# Patient Record
Sex: FEMALE | Race: WHITE | NOT HISPANIC OR LATINO | URBAN - METROPOLITAN AREA
[De-identification: names, ages, dates, MRNs, and addresses within clinical notes are randomized per-mention and may not be internally consistent; named-entity substitution may affect disease eponyms.]

---

## 2022-06-10 ENCOUNTER — OFFICE VISIT (OUTPATIENT)
Dept: FAMILY MEDICINE CLINIC | Facility: CLINIC | Age: 32
End: 2022-06-10
Payer: COMMERCIAL

## 2022-06-10 VITALS
TEMPERATURE: 97.7 F | SYSTOLIC BLOOD PRESSURE: 124 MMHG | BODY MASS INDEX: 28.69 KG/M2 | HEIGHT: 65 IN | HEART RATE: 92 BPM | DIASTOLIC BLOOD PRESSURE: 70 MMHG | WEIGHT: 172.2 LBS | OXYGEN SATURATION: 97 %

## 2022-06-10 DIAGNOSIS — Z91.09 ENVIRONMENTAL ALLERGIES: ICD-10-CM

## 2022-06-10 DIAGNOSIS — E28.2 PCOS (POLYCYSTIC OVARIAN SYNDROME): ICD-10-CM

## 2022-06-10 DIAGNOSIS — Z13.220 SCREENING FOR LIPID DISORDERS: ICD-10-CM

## 2022-06-10 DIAGNOSIS — Z91.018 MULTIPLE FOOD ALLERGIES: ICD-10-CM

## 2022-06-10 DIAGNOSIS — G35 MULTIPLE SCLEROSIS (HCC): Primary | ICD-10-CM

## 2022-06-10 DIAGNOSIS — R87.619 ABNORMAL CERVICAL PAPANICOLAOU SMEAR, UNSPECIFIED ABNORMAL PAP FINDING: ICD-10-CM

## 2022-06-10 DIAGNOSIS — F33.42 RECURRENT MAJOR DEPRESSIVE DISORDER, IN FULL REMISSION (HCC): ICD-10-CM

## 2022-06-10 DIAGNOSIS — Z83.2 FAMILY HISTORY OF CLOTTING DISORDER: ICD-10-CM

## 2022-06-10 DIAGNOSIS — Z11.59 ENCOUNTER FOR HEPATITIS C SCREENING TEST FOR LOW RISK PATIENT: ICD-10-CM

## 2022-06-10 DIAGNOSIS — Z11.4 SCREENING FOR HIV (HUMAN IMMUNODEFICIENCY VIRUS): ICD-10-CM

## 2022-06-10 PROCEDURE — 3725F SCREEN DEPRESSION PERFORMED: CPT | Performed by: NURSE PRACTITIONER

## 2022-06-10 PROCEDURE — 99204 OFFICE O/P NEW MOD 45 MIN: CPT | Performed by: NURSE PRACTITIONER

## 2022-06-10 PROCEDURE — 3008F BODY MASS INDEX DOCD: CPT | Performed by: NURSE PRACTITIONER

## 2022-06-10 RX ORDER — PROPRANOLOL/HYDROCHLOROTHIAZID 40 MG-25MG
TABLET ORAL
COMMUNITY

## 2022-06-10 RX ORDER — DULOXETIN HYDROCHLORIDE 30 MG/1
30 CAPSULE, DELAYED RELEASE ORAL DAILY
Qty: 30 CAPSULE | Refills: 6 | Status: SHIPPED | OUTPATIENT
Start: 2022-06-10

## 2022-06-10 RX ORDER — OMEGA-3S/DHA/EPA/FISH OIL/D3 300MG-1000
400 CAPSULE ORAL DAILY
COMMUNITY

## 2022-06-10 RX ORDER — NORETHINDRONE ACETATE AND ETHINYL ESTRADIOL 1MG-20(21)
1 KIT ORAL DAILY
COMMUNITY
Start: 2022-04-13 | End: 2022-06-15 | Stop reason: ALTCHOICE

## 2022-06-10 RX ORDER — DULOXETIN HYDROCHLORIDE 30 MG/1
30 CAPSULE, DELAYED RELEASE ORAL DAILY
COMMUNITY
End: 2022-06-10 | Stop reason: SDUPTHER

## 2022-06-10 NOTE — PROGRESS NOTES
BMI Counseling: Body mass index is 29 1 kg/m²  The BMI is above normal  Nutrition recommendations include decreasing portion sizes, encouraging healthy choices of fruits and vegetables, decreasing fast food intake, consuming healthier snacks, limiting drinks that contain sugar, moderation in carbohydrate intake, increasing intake of lean protein, reducing intake of saturated and trans fat and reducing intake of cholesterol  Exercise recommendations include moderate physical activity 150 minutes/week  No pharmacotherapy was ordered  Patient referred to PCP  Rationale for BMI follow-up plan is due to patient being overweight or obese       Assessment/Plan:           Problem List Items Addressed This Visit        Endocrine    PCOS (polycystic ovarian syndrome)    Relevant Orders    Comprehensive metabolic panel    CBC and differential    TSH, 3rd generation with Free T4 reflex       Nervous and Auditory    Multiple sclerosis ([facility] ) - Primary    Relevant Orders    Ambulatory Referral to Neurology    Comprehensive metabolic panel    CBC and differential       Other    Abnormal cervical Papanicolaou smear    Relevant Orders    Ambulatory Referral to Gynecology    Recurrent major depressive disorder, in full remission ([facility] )    Relevant Medications    DULoxetine (CYMBALTA) 30 mg delayed release capsule    Other Relevant Orders    Comprehensive metabolic panel    TSH, 3rd generation with Free T4 reflex    Family history of clotting disorder    Relevant Orders    Thrombosis Panel    Multiple food allergies    Relevant Orders    Food Allergy Profile    Environmental allergies    Relevant Orders    [place] Allergy Panel, Adult      Other Visit Diagnoses     Encounter for hepatitis C screening test for low risk patient        Relevant Orders    Hepatitis C antibody    Screening for HIV (human immunodeficiency virus)        Relevant Orders    HIV 1/2 ANTIGEN/ANTIBODY (4TH GENERATION) W REFLEX [facility]    Screening for lipid disorders        Relevant Orders    Lipid Panel with Direct LDL reflex            Subjective:      Patient ID: Colletta Croissant is a 28 y o  female  Patient here to establish care  Patient has history of MS and was diagnosed 2 years and was on Copaxone and stopped the medication and had infection from the medication and had pain in her head and neck and was a little over a year ago  Patient has C-spine lesions and has issues with her eyes hands and feet  Symptoms are stable off medications last flare was in 12/2020 when she had COVID and has been good since  Patient has not been on anything other than her Copaxone  Patient would like to have a referral for alternate neurology  Patient also reports that she had a history of cysts ovarian when she was younger and had history of pelvic pain and odd irregular periods  Patient is looking for a GYN patient reports that she started birth control and started two years ago to try to help with her MS symptoms finding that PMS was making it worse  Patient has not had a period in two years other than spotting  Patient is on birth control and takes consistently and reports that she has not had a GYN exam in the past year and has not had a period in the past year  Patient is not trying to get pregnant at this time  Patient was having HPV and needed to have a colposcopy  Patient was told she has PCOS in the past  Patient has not been tested for clotting issue with being on birth control     Patient also reports that she has a history of opioid abuse patient was on suboxone for three years and tapered down and have been off everything for four months  Patient was following with Dr Jamar Cueva in [place]  Patient has been clean for 4 years and not having to go meetings  Patient is stable on her Duloxetine and is on for her anxiety and depression is on the 30 mg daily and has been on for the past six months   Patient is cut back on drinking only special occasions quit smoking cigarettes and is vamping and cutting down on the nicotine     Patient was having issues with constipation and was having issues with constipation and saw Dr Deepti Dinero and had rectal fissures and had an ointment and fiber and pharmacy  Patient is likely with IBS-C  Patient at this point is watching her diet and also carb limiting and fiber increase and colace and miralax patient is having a bowel movement daily and is not always emptying out  Patient family history mom adopted unknown family history dad with blood clots and he is on a blood thinner  Grandmom on dad side with colon cancer passed at 77 Aunt on dad side with breast cancer         The following portions of the patient's history were reviewed and updated as appropriate:   She  has no past medical history on file  She   Patient Active Problem List    Diagnosis Date Noted    Multiple sclerosis ([facility] ) 06/10/2022    Abnormal cervical Papanicolaou smear 06/10/2022    Recurrent major depressive disorder, in full remission ([facility] ) 06/10/2022    Family history of clotting disorder 06/10/2022    Multiple food allergies 06/10/2022    Environmental allergies 06/10/2022    PCOS (polycystic ovarian syndrome) 06/10/2022     She  has no past surgical history on file  Her family history is not on file  She  reports that she has quit smoking  She has a 15 00 pack-year smoking history  She has never used smokeless tobacco  No history on file for alcohol use and drug use    Current Outpatient Medications   Medication Sig Dispense Refill    cholecalciferol (VITAMIN D3) 400 units tablet Take 400 Units by mouth daily      DULoxetine (CYMBALTA) 30 mg delayed release capsule Take 1 capsule (30 mg total) by mouth daily 30 capsule 6    KRILL OIL PO Take by mouth      Multiple Vitamin (MULTI VITAMIN DAILY PO) Take by mouth      norethindrone-ethinyl estradiol (JUNEL FE 1/20) 1-20 MG-MCG per tablet Take 1 tablet by mouth daily      Probiotic Product (PROBIOTIC-10 PO) Take by mouth      Turmeric 500 MG CAPS Take by mouth       No current facility-administered medications for this visit  She is allergic to iodine - food allergy, shrimp flavor - food allergy, and lovenox [enoxaparin]       Review of Systems   Constitutional: Negative for activity change, appetite change, chills, diaphoresis, fatigue, fever and unexpected weight change  HENT: Positive for dental problem  Negative for congestion, drooling, ear discharge, ear pain, facial swelling, hearing loss, mouth sores, nosebleeds, postnasal drip, rhinorrhea, sinus pressure, sinus pain, sneezing, sore throat, tinnitus, trouble swallowing and voice change  Eyes: Negative for pain, redness and visual disturbance  Wearing glasses and has scaring in optic nerves and has yearly eye exams    Respiratory: Negative for apnea, cough, choking, chest tightness, shortness of breath, wheezing and stridor  Cardiovascular: Negative for chest pain, palpitations and leg swelling  Gastrointestinal: Positive for constipation  Negative for abdominal distention, abdominal pain, anal bleeding, blood in stool, diarrhea, nausea and vomiting  Endocrine: Negative  Genitourinary: Positive for hematuria  Hx of kidney stone   Musculoskeletal: Positive for neck pain  Negative for arthralgias, back pain, gait problem, joint swelling, myalgias and neck stiffness  Has some arthritis in her neck    Skin: Negative  Allergic/Immunologic: Positive for environmental allergies and food allergies  Neurological: Positive for headaches  Negative for dizziness, tremors, seizures, syncope, facial asymmetry, speech difficulty, weakness, light-headedness and numbness  Hematological: Negative  Negative for adenopathy  Does not bruise/bleed easily  Psychiatric/Behavioral: Negative for behavioral problems, dysphoric mood and sleep disturbance           Objective:      /70 (BP Location: Left arm, Patient Position: Sitting)   Pulse 92   Temp 97 7 °F (36 5 °C) (Temporal)   Ht 5' 4 5" (1 638 m)   Wt 78 1 kg (172 lb 3 2 oz)   SpO2 97%   BMI 29 10 kg/m²          Physical Exam  Vitals and nursing note reviewed  Constitutional:       General: She is not in acute distress  Appearance: She is well-developed  HENT:      Head: Normocephalic and atraumatic  Right Ear: Tympanic membrane normal       Left Ear: Tympanic membrane normal       Nose: Nose normal       Mouth/Throat:      Mouth: Mucous membranes are moist    Eyes:      Pupils: Pupils are equal, round, and reactive to light  Neck:      Thyroid: No thyromegaly  Cardiovascular:      Rate and Rhythm: Normal rate and regular rhythm  Pulses: no weak pulses          Dorsalis pedis pulses are 2+ on the right side and 2+ on the left side  Posterior tibial pulses are 2+ on the right side and 2+ on the left side  Heart sounds: Normal heart sounds  No murmur heard  Pulmonary:      Effort: Pulmonary effort is normal  No respiratory distress  Breath sounds: Normal breath sounds  No wheezing  Abdominal:      General: Bowel sounds are normal       Palpations: Abdomen is soft  Musculoskeletal:         General: Normal range of motion  Cervical back: Normal range of motion  Feet:      Right foot:      Skin integrity: No ulcer, skin breakdown, erythema, warmth, callus or dry skin  Left foot:      Skin integrity: No ulcer, skin breakdown, erythema, warmth, callus or dry skin  Skin:     General: Skin is warm and dry  Capillary Refill: Capillary refill takes less than 2 seconds  Neurological:      General: No focal deficit present  Mental Status: She is alert and oriented to person, place, and time  Psychiatric:         Mood and Affect: Mood normal          Behavior: Behavior normal          Thought Content: Thought content normal          Judgment: Judgment normal        Patient's shoes and socks removed      Right Foot/Ankle   Right Foot Inspection  Skin Exam: skin normal and skin intact  No dry skin, no warmth, no callus, no erythema, no maceration, no abnormal color, no pre-ulcer, no ulcer and no callus  Toe Exam: ROM and strength within normal limits  Sensory   Vibration: intact  Proprioception: intact  Monofilament testing: intact    Vascular  Capillary refills: < 3 seconds  The right DP pulse is 2+  The right PT pulse is 2+  Left Foot/Ankle  Left Foot Inspection  Skin Exam: skin normal and skin intact  No dry skin, no warmth, no erythema, no maceration, normal color, no pre-ulcer, no ulcer and no callus  Toe Exam: ROM and strength within normal limits  Sensory   Vibration: intact  Proprioception: intact  Monofilament testing: intact    Vascular  Capillary refills: < 3 seconds  The left DP pulse is 2+  The left PT pulse is 2+       Assign Risk Category  No deformity present  No loss of protective sensation  No weak pulses  Risk: 0

## 2022-06-11 ENCOUNTER — APPOINTMENT (OUTPATIENT)
Dept: LAB | Facility: MEDICAL CENTER | Age: 32
End: 2022-06-11
Payer: COMMERCIAL

## 2022-06-11 DIAGNOSIS — E28.2 PCOS (POLYCYSTIC OVARIAN SYNDROME): ICD-10-CM

## 2022-06-11 DIAGNOSIS — Z13.220 SCREENING FOR LIPID DISORDERS: ICD-10-CM

## 2022-06-11 DIAGNOSIS — Z11.4 SCREENING FOR HIV (HUMAN IMMUNODEFICIENCY VIRUS): ICD-10-CM

## 2022-06-11 DIAGNOSIS — Z91.018 MULTIPLE FOOD ALLERGIES: ICD-10-CM

## 2022-06-11 DIAGNOSIS — Z83.2 FAMILY HISTORY OF CLOTTING DISORDER: ICD-10-CM

## 2022-06-11 DIAGNOSIS — Z91.09 ENVIRONMENTAL ALLERGIES: ICD-10-CM

## 2022-06-11 DIAGNOSIS — G35 MULTIPLE SCLEROSIS (HCC): ICD-10-CM

## 2022-06-11 DIAGNOSIS — Z11.59 ENCOUNTER FOR HEPATITIS C SCREENING TEST FOR LOW RISK PATIENT: ICD-10-CM

## 2022-06-11 DIAGNOSIS — F33.42 RECURRENT MAJOR DEPRESSIVE DISORDER, IN FULL REMISSION (HCC): ICD-10-CM

## 2022-06-11 LAB
ALBUMIN SERPL BCP-MCNC: 4.1 G/DL (ref 3.5–5)
ALP SERPL-CCNC: 46 U/L (ref 46–116)
ALT SERPL W P-5'-P-CCNC: 27 U/L (ref 12–78)
ANION GAP SERPL CALCULATED.3IONS-SCNC: 7 MMOL/L (ref 4–13)
AST SERPL W P-5'-P-CCNC: 14 U/L (ref 5–45)
BASOPHILS # BLD AUTO: 0.06 THOUSANDS/ΜL (ref 0–0.1)
BASOPHILS NFR BLD AUTO: 1 % (ref 0–1)
BILIRUB SERPL-MCNC: 0.35 MG/DL (ref 0.2–1)
BUN SERPL-MCNC: 16 MG/DL (ref 5–25)
CALCIUM SERPL-MCNC: 9.4 MG/DL (ref 8.3–10.1)
CHLORIDE SERPL-SCNC: 105 MMOL/L (ref 100–108)
CHOLEST SERPL-MCNC: 213 MG/DL
CO2 SERPL-SCNC: 25 MMOL/L (ref 21–32)
CREAT SERPL-MCNC: 0.9 MG/DL (ref 0.6–1.3)
EOSINOPHIL # BLD AUTO: 0.17 THOUSAND/ΜL (ref 0–0.61)
EOSINOPHIL NFR BLD AUTO: 3 % (ref 0–6)
ERYTHROCYTE [DISTWIDTH] IN BLOOD BY AUTOMATED COUNT: 12.2 % (ref 11.6–15.1)
GFR SERPL CREATININE-BSD FRML MDRD: 86 ML/MIN/1.73SQ M
GLUCOSE P FAST SERPL-MCNC: 88 MG/DL (ref 65–99)
HCT VFR BLD AUTO: 45.8 % (ref 34.8–46.1)
HCV AB SER QL: NORMAL
HDLC SERPL-MCNC: 59 MG/DL
HGB BLD-MCNC: 15.2 G/DL (ref 11.5–15.4)
IMM GRANULOCYTES # BLD AUTO: 0.01 THOUSAND/UL (ref 0–0.2)
IMM GRANULOCYTES NFR BLD AUTO: 0 % (ref 0–2)
LDLC SERPL CALC-MCNC: 135 MG/DL (ref 0–100)
LYMPHOCYTES # BLD AUTO: 2.37 THOUSANDS/ΜL (ref 0.6–4.47)
LYMPHOCYTES NFR BLD AUTO: 43 % (ref 14–44)
MCH RBC QN AUTO: 30.9 PG (ref 26.8–34.3)
MCHC RBC AUTO-ENTMCNC: 33.2 G/DL (ref 31.4–37.4)
MCV RBC AUTO: 93 FL (ref 82–98)
MONOCYTES # BLD AUTO: 0.41 THOUSAND/ΜL (ref 0.17–1.22)
MONOCYTES NFR BLD AUTO: 8 % (ref 4–12)
NEUTROPHILS # BLD AUTO: 2.44 THOUSANDS/ΜL (ref 1.85–7.62)
NEUTS SEG NFR BLD AUTO: 45 % (ref 43–75)
NRBC BLD AUTO-RTO: 0 /100 WBCS
PLATELET # BLD AUTO: 237 THOUSANDS/UL (ref 149–390)
PMV BLD AUTO: 10.3 FL (ref 8.9–12.7)
POTASSIUM SERPL-SCNC: 3.8 MMOL/L (ref 3.5–5.3)
PROT SERPL-MCNC: 7.9 G/DL (ref 6.4–8.2)
RBC # BLD AUTO: 4.92 MILLION/UL (ref 3.81–5.12)
SODIUM SERPL-SCNC: 137 MMOL/L (ref 136–145)
TRIGL SERPL-MCNC: 93 MG/DL
TSH SERPL DL<=0.05 MIU/L-ACNC: 1.69 UIU/ML (ref 0.45–4.5)
WBC # BLD AUTO: 5.46 THOUSAND/UL (ref 4.31–10.16)

## 2022-06-11 PROCEDURE — 80061 LIPID PANEL: CPT

## 2022-06-11 PROCEDURE — 86146 BETA-2 GLYCOPROTEIN ANTIBODY: CPT

## 2022-06-11 PROCEDURE — 80053 COMPREHEN METABOLIC PANEL: CPT

## 2022-06-11 PROCEDURE — 85306 CLOT INHIBIT PROT S FREE: CPT

## 2022-06-11 PROCEDURE — 36415 COLL VENOUS BLD VENIPUNCTURE: CPT

## 2022-06-11 PROCEDURE — 85705 THROMBOPLASTIN INHIBITION: CPT

## 2022-06-11 PROCEDURE — 85303 CLOT INHIBIT PROT C ACTIVITY: CPT

## 2022-06-11 PROCEDURE — 85305 CLOT INHIBIT PROT S TOTAL: CPT

## 2022-06-11 PROCEDURE — 85613 RUSSELL VIPER VENOM DILUTED: CPT

## 2022-06-11 PROCEDURE — 84443 ASSAY THYROID STIM HORMONE: CPT

## 2022-06-11 PROCEDURE — 85300 ANTITHROMBIN III ACTIVITY: CPT

## 2022-06-11 PROCEDURE — 87389 HIV-1 AG W/HIV-1&-2 AB AG IA: CPT

## 2022-06-11 PROCEDURE — 86147 CARDIOLIPIN ANTIBODY EA IG: CPT

## 2022-06-11 PROCEDURE — 85670 THROMBIN TIME PLASMA: CPT

## 2022-06-11 PROCEDURE — 85732 THROMBOPLASTIN TIME PARTIAL: CPT

## 2022-06-11 PROCEDURE — 86803 HEPATITIS C AB TEST: CPT

## 2022-06-11 PROCEDURE — 81241 F5 GENE: CPT

## 2022-06-11 PROCEDURE — 85025 COMPLETE CBC W/AUTO DIFF WBC: CPT

## 2022-06-11 PROCEDURE — 82785 ASSAY OF IGE: CPT

## 2022-06-11 PROCEDURE — 86003 ALLG SPEC IGE CRUDE XTRC EA: CPT

## 2022-06-11 PROCEDURE — 81240 F2 GENE: CPT

## 2022-06-12 LAB
DEPRECATED AT III PPP: 87 % OF NORMAL (ref 92–136)
HIV 1+2 AB+HIV1 P24 AG SERPL QL IA: NORMAL

## 2022-06-13 LAB
ALMOND IGE QN: <0.1 KUA/I
CASHEW NUT IGE QN: <0.1 KUA/I
CODFISH IGE QN: <0.1 KUA/I
EGG WHITE IGE QN: <0.1 KUA/I
GLUTEN IGE QN: <0.1 KUA/I
HAZELNUT IGE QN: <0.1 KUA/L
MILK IGE QN: <0.1 KUA/I
PEANUT IGE QN: <0.1 KUA/I
PROT C AG ACT/NOR PPP IA: 125 % OF NORMAL (ref 60–150)
PROT S ACT/NOR PPP: 110 % (ref 68–108)
SALMON IGE QN: <0.1 KUA/I
SCALLOP IGE QN: <0.1 KUA/L
SESAME SEED IGE QN: <0.1 KUA/I
SHRIMP IGE QN: <0.1 KUA/L
SOYBEAN IGE QN: <0.1 KUA/I
TOTAL IGE SMQN RAST: 9.99 KU/L (ref 0–113)
TUNA IGE QN: <0.1 KUA/I
WALNUT IGE QN: <0.1 KUA/I
WHEAT IGE QN: <0.1 KUA/I

## 2022-06-14 LAB
APTT SCREEN TO CONFIRM RATIO: 1.1 RATIO (ref 0–1.34)
CONFIRM APTT/NORMAL: 34.7 SEC (ref 0–47.6)
LA PPP-IMP: NORMAL
PROT S ACT/NOR PPP: 108 % (ref 61–136)
PROT S PPP-ACNC: 89 % (ref 60–150)
SCREEN APTT: 41.2 SEC (ref 0–51.9)
SCREEN DRVVT: 40.6 SEC (ref 0–47)
THROMBIN TIME: 16.4 SEC (ref 0–23)

## 2022-06-15 PROBLEM — D68.59 ANTITHROMBIN 3 DEFICIENCY (HCC): Status: ACTIVE | Noted: 2022-06-15

## 2022-06-15 LAB
A ALTERNATA IGE QN: <0.1 KUA/I
A FUMIGATUS IGE QN: <0.1 KUA/I
B2 GLYCOPROT1 IGA SERPL IA-ACNC: 2
B2 GLYCOPROT1 IGG SERPL IA-ACNC: 0.8
B2 GLYCOPROT1 IGM SERPL IA-ACNC: <2.9
BERMUDA GRASS IGE QN: <0.1 KUA/I
BOXELDER IGE QN: <0.1 KUA/I
C HERBARUM IGE QN: <0.1 KUA/I
CARDIOLIPIN IGA SER IA-ACNC: 1.9
CARDIOLIPIN IGG SER IA-ACNC: 1
CARDIOLIPIN IGM SER IA-ACNC: 1
CAT DANDER IGE QN: <0.1 KUA/I
CMN PIGWEED IGE QN: <0.1 KUA/I
COMMON RAGWEED IGE QN: <0.1 KUA/I
COTTONWOOD IGE QN: <0.1 KUA/I
D FARINAE IGE QN: <0.1 KUA/I
D PTERONYSS IGE QN: <0.1 KUA/I
DOG DANDER IGE QN: <0.1 KUA/I
LONDON PLANE IGE QN: <0.1 KUA/I
MOUSE URINE PROT IGE QN: <0.1 KUA/I
MT JUNIPER IGE QN: <0.1 KUA/I
MUGWORT IGE QN: <0.1 KUA/I
P NOTATUM IGE QN: <0.1 KUA/I
ROACH IGE QN: <0.1 KUA/I
SHEEP SORREL IGE QN: <0.1 KUA/I
SILVER BIRCH IGE QN: <0.1 KUA/I
TIMOTHY IGE QN: <0.1 KUA/I
TOTAL IGE SMQN RAST: 10.1 KU/L (ref 0–113)
WALNUT IGE QN: <0.1 KUA/I
WHITE ASH IGE QN: <0.1 KUA/I
WHITE ELM IGE QN: <0.1 KUA/I
WHITE MULBERRY IGE QN: <0.1 KUA/I
WHITE OAK IGE QN: <0.1 KUA/I

## 2022-06-16 LAB
F2 GENE MUT ANL BLD/T: NORMAL
F5 GENE MUT ANL BLD/T: NORMAL

## 2022-09-23 ENCOUNTER — OFFICE VISIT (OUTPATIENT)
Dept: OBGYN CLINIC | Facility: MEDICAL CENTER | Age: 32
End: 2022-09-23
Payer: COMMERCIAL

## 2022-09-23 VITALS
DIASTOLIC BLOOD PRESSURE: 84 MMHG | SYSTOLIC BLOOD PRESSURE: 132 MMHG | BODY MASS INDEX: 30.19 KG/M2 | HEIGHT: 64 IN | WEIGHT: 176.8 LBS

## 2022-09-23 DIAGNOSIS — R87.619 ABNORMAL CERVICAL PAPANICOLAOU SMEAR, UNSPECIFIED ABNORMAL PAP FINDING: ICD-10-CM

## 2022-09-23 DIAGNOSIS — D68.59 ANTITHROMBIN 3 DEFICIENCY (HCC): ICD-10-CM

## 2022-09-23 DIAGNOSIS — G35 MULTIPLE SCLEROSIS (HCC): ICD-10-CM

## 2022-09-23 DIAGNOSIS — Z87.2 HISTORY OF DIMPLING OF BREAST SKIN: ICD-10-CM

## 2022-09-23 DIAGNOSIS — Z01.419 ENCOUNTER FOR ANNUAL ROUTINE GYNECOLOGICAL EXAMINATION: Primary | ICD-10-CM

## 2022-09-23 DIAGNOSIS — Z11.51 SCREENING FOR HUMAN PAPILLOMAVIRUS (HPV): ICD-10-CM

## 2022-09-23 DIAGNOSIS — Z30.41 ORAL CONTRACEPTIVE PILL SURVEILLANCE: ICD-10-CM

## 2022-09-23 PROCEDURE — G0476 HPV COMBO ASSAY CA SCREEN: HCPCS | Performed by: OBSTETRICS & GYNECOLOGY

## 2022-09-23 PROCEDURE — S0610 ANNUAL GYNECOLOGICAL EXAMINA: HCPCS | Performed by: OBSTETRICS & GYNECOLOGY

## 2022-09-23 PROCEDURE — G0145 SCR C/V CYTO,THINLAYER,RESCR: HCPCS | Performed by: OBSTETRICS & GYNECOLOGY

## 2022-09-23 RX ORDER — ACETAMINOPHEN AND CODEINE PHOSPHATE 120; 12 MG/5ML; MG/5ML
1 SOLUTION ORAL DAILY
Qty: 84 TABLET | Refills: 1 | Status: SHIPPED | OUTPATIENT
Start: 2022-09-23

## 2022-09-23 NOTE — PROGRESS NOTES
Tina Leonel  1990      CC:  Yearly exam    S:  28 y o  female here for yearly exam      Was on [address] for control of PMS - but had to stop with antithrombin III deficiency diagnosis  Has had two cycles off of OCP - heavy and crampy and longer lasting (7-8d)  She has a concern today about some right breast skin dimpling at the outer quadrants  She is unsure if it related to weight changes or not  She denies vaginal discharge, itching, pelvic pain  Sexual activity: She is not currently sexually active - split from boyfriend just recently  She is interested in STD screening today  Contraception: Interested in other contraceptive options  Last Pap:  4/23/21 - Normal Cytology, HR HPV positive     We reviewed ASCCP guidelines for Pap testing today       Family hx of breast cancer: no  Family hx of ovarian cancer: no  Family hx of colon cancer: no      Current Outpatient Medications:     cholecalciferol (VITAMIN D3) 400 units tablet, Take 400 Units by mouth daily, Disp: , Rfl:     DULoxetine (CYMBALTA) 30 mg delayed release capsule, Take 1 capsule (30 mg total) by mouth daily, Disp: 30 capsule, Rfl: 6    KRILL OIL PO, Take by mouth, Disp: , Rfl:     Multiple Vitamin (MULTI VITAMIN DAILY PO), Take by mouth, Disp: , Rfl:     Probiotic Product (PROBIOTIC-10 PO), Take by mouth, Disp: , Rfl:     Turmeric 500 MG CAPS, Take by mouth, Disp: , Rfl:   Patient Active Problem List   Diagnosis    [facility] ([facility] )    Abnormal cervical Papanicolaou smear    Recurrent major depressive disorder, in full remission ([facility] )    Family history of clotting disorder    Multiple food allergies    Environmental allergies    PCOS (polycystic ovarian syndrome)    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(HCC)     Past Medical History:   Diagnosis Date    Abnormal Pap smear of cervix 2015    Depression     Multiple sclerosis ([facility]     Opioid abuse, in remission ([facility] )     Polycystic ovary syndrome     Substance abuse ([facility] )      Family History   Problem Relation Age of Onset    Hypertension Mother     Anxiety disorder Mother     Deep vein thrombosis Father     Pulmonary embolism Father     Cancer Paternal Grandmother           Review of Systems   Respiratory: Negative  Cardiovascular: Negative  Gastrointestinal: Negative for constipation and diarrhea  O:  Blood pressure 132/84, height 5' 4" (1 626 m), weight 80 2 kg (176 lb 12 8 oz), last menstrual period 08/25/2022  Patient appears well and is not in distress  Breasts are symmetrical without mass, tenderness, nipple discharge, skin changes or adenopathy    + dimpling in upright positioning  Abdomen is soft and nontender without masses  External genitals are normal without lesions or rashes  Urethral meatus and urethra are normal  Bladder is normal to palpation  Vagina is normal without discharge or bleeding  Cervix is normal without discharge or lesion  Uterus is normal, mobile, nontender without palpable mass  Adnexa are normal, nontender, without palpable mass  A:  Yearly exam      P:   Pap & HPV today     Discussed trial of micronor, which is acceptable contraceptive risk- discussed importance of strict dosing  She is undecided if she will pursue pregnancy in the future, but would like to know risks in setting of her MS and ATIII deficiency  She has a noted allergy to Lovenox - but reports was just a rash at injection site  She is very interested in a referral to [facility] for a pre-conception consult to discuss risks  Will check breast US for skin changes  RTO three months for pill check

## 2022-09-26 LAB
HPV HR 12 DNA CVX QL NAA+PROBE: POSITIVE
HPV16 DNA CVX QL NAA+PROBE: NEGATIVE
HPV18 DNA CVX QL NAA+PROBE: NEGATIVE

## 2022-10-06 ENCOUNTER — TELEPHONE (OUTPATIENT)
Dept: PERINATAL CARE | Facility: CLINIC | Age: 32
End: 2022-10-06

## 2022-10-06 LAB
LAB AP GYN PRIMARY INTERPRETATION: NORMAL
LAB AP LMP: NORMAL
Lab: NORMAL

## 2022-10-06 NOTE — TELEPHONE ENCOUNTER
Left a message three times for patient   Unable to schedule the pt for an appointment as indicated in referral

## 2022-10-10 ENCOUNTER — TELEPHONE (OUTPATIENT)
Dept: OBGYN CLINIC | Facility: CLINIC | Age: 32
End: 2022-10-10

## 2022-10-10 NOTE — TELEPHONE ENCOUNTER
----- Message from Gil Gibbs MD sent at 10/10/2022 10:14 AM EDT -----  Patient has persistent positive HPV so needs colposcopy - please notify and schedule

## 2023-01-04 DIAGNOSIS — F33.42 RECURRENT MAJOR DEPRESSIVE DISORDER, IN FULL REMISSION (HCC): ICD-10-CM

## 2023-01-04 RX ORDER — DULOXETIN HYDROCHLORIDE 30 MG/1
CAPSULE, DELAYED RELEASE ORAL
Qty: 30 CAPSULE | Refills: 6 | Status: SHIPPED | OUTPATIENT
Start: 2023-01-04

## 2023-03-15 DIAGNOSIS — Z30.41 ORAL CONTRACEPTIVE PILL SURVEILLANCE: ICD-10-CM

## 2023-03-15 RX ORDER — NORETHINDRONE 0.35 MG/1
TABLET ORAL
Qty: 84 TABLET | Refills: 1 | Status: SHIPPED | OUTPATIENT
Start: 2023-03-15

## 2023-06-22 ENCOUNTER — APPOINTMENT (OUTPATIENT)
Dept: RADIOLOGY | Facility: CLINIC | Age: 33
End: 2023-06-22
Payer: COMMERCIAL

## 2023-06-22 ENCOUNTER — OFFICE VISIT (OUTPATIENT)
Dept: URGENT CARE | Facility: CLINIC | Age: 33
End: 2023-06-22
Payer: COMMERCIAL

## 2023-06-22 VITALS
SYSTOLIC BLOOD PRESSURE: 131 MMHG | HEART RATE: 90 BPM | TEMPERATURE: 98.5 F | WEIGHT: 179.4 LBS | BODY MASS INDEX: 30.63 KG/M2 | DIASTOLIC BLOOD PRESSURE: 81 MMHG | OXYGEN SATURATION: 97 % | HEIGHT: 64 IN

## 2023-06-22 DIAGNOSIS — S90.212A CONTUSION OF LEFT GREAT TOE WITH DAMAGE TO NAIL, INITIAL ENCOUNTER: Primary | ICD-10-CM

## 2023-06-22 DIAGNOSIS — S90.212A CONTUSION OF LEFT GREAT TOE WITH DAMAGE TO NAIL, INITIAL ENCOUNTER: ICD-10-CM

## 2023-06-22 PROCEDURE — 99213 OFFICE O/P EST LOW 20 MIN: CPT | Performed by: PHYSICIAN ASSISTANT

## 2023-06-22 PROCEDURE — 73660 X-RAY EXAM OF TOE(S): CPT

## 2023-06-23 NOTE — PATIENT INSTRUCTIONS
Keep foot elevated  Keep area clean and covered  Follow-up with your primary care provider in the next 3-5 days  Any new or worsening symptoms develop get re-evaluated sooner or proceed to the ER  Radiologists reading of xray's will be available tomorrow morning

## 2023-06-23 NOTE — PROGRESS NOTES
[facility]        NAME: Elaine Gardiner is a 33 y o  female  : 1990    MRN: [id number]  DATE: 2023  TIME: 8:20 PM    Assessment and Plan   Contusion of left great toe with damage to nail, initial encounter [S90 212A]  1  Contusion of left great toe with damage to nail, initial encounter  XR toe left great min 2 views            Patient Instructions   Patient Instructions   Keep foot elevated  Keep area clean and covered  Follow-up with your primary care provider in the next 3-5 days  Any new or worsening symptoms develop get re-evaluated sooner or proceed to the ER  Radiologists reading of xray's will be available tomorrow morning  Follow up with PCP in 3-5 days  Proceed to  ER if symptoms worsen  Chief Complaint     Chief Complaint   Patient presents with   • Toe Injury     Big toe Nail injury left foot  History of Present Illness       Patient hit top of the left great toe off a metal door stop within the last hour  Split the tip of the nail and has pain and bleeding  Can walk on it  Area is throbbing but no numbness/      Review of Systems   Review of Systems   Musculoskeletal: Positive for arthralgias  Negative for gait problem  Skin: Positive for wound  Neurological: Negative for weakness and numbness           Current Medications       Current Outpatient Medications:   •  cholecalciferol (VITAMIN D3) 400 units tablet, Take 400 Units by mouth daily, Disp: , Rfl:   •  DULoxetine (CYMBALTA) 30 mg delayed release capsule, take 1 capsule by mouth daily, Disp: 30 capsule, Rfl: 6  •  Jencycla 0 35 MG tablet, take 1 tablet by mouth once daily, Disp: 84 tablet, Rfl: 1  •  KRILL OIL PO, Take by mouth, Disp: , Rfl:   •  Multiple Vitamin (MULTI VITAMIN DAILY PO), Take by mouth, Disp: , Rfl:   •  Probiotic Product (PROBIOTIC-10 PO), Take by mouth, Disp: , Rfl:   •  Turmeric 500 MG CAPS, Take by mouth, Disp: , Rfl:     Current Allergies     Allergies as of 2023 "- Reviewed 06/22/2023   Allergen Reaction Noted   • Iodine - food allergy Hives 06/10/2022   • Shrimp flavor - food allergy Hives 06/10/2022   • Lovenox [enoxaparin] Rash 06/10/2022            The following portions of the patient's history were reviewed and updated as appropriate: allergies, current medications, past family history, past medical history, past social history, past surgical history and problem list      Past Medical History:   Diagnosis Date   • Abnormal Pap smear of cervix 2015   • Depression    • Multiple sclerosis ([facility] )    • Opioid abuse, in remission ([facility] )    • Polycystic ovary syndrome    • Substance abuse ([place] [address] )        History reviewed  No pertinent surgical history  Family History   Problem Relation Age of Onset   • Hypertension Mother    • Anxiety disorder Mother    • Deep vein thrombosis Father    • Pulmonary embolism Father    • Cancer Paternal Grandmother          Medications have been verified  Objective   /81   Pulse 90   Temp 98 5 °F (36 9 °C)   Ht 5' 4\" (1 626 m)   Wt 81 4 kg (179 lb 6 4 oz)   SpO2 97%   BMI 30 79 kg/m²        Physical Exam     Physical Exam  Constitutional:       Appearance: Normal appearance  Cardiovascular:      Pulses: Normal pulses  Musculoskeletal:         General: Tenderness present  No swelling or deformity  Normal range of motion  Feet:    Feet:      Comments: 0 5cm superifical laceration of the tip of the left great nail/nailbed  No wound edge separation  Opaque nail polish over the nail  No ecchymosis, swelling, or deformity  Skin:     General: Skin is warm  Capillary Refill: Capillary refill takes less than 2 seconds  Findings: No bruising or erythema  Neurological:      Mental Status: She is alert     Psychiatric:         Mood and Affect: Mood normal          Behavior: Behavior normal                    "

## 2023-08-03 DIAGNOSIS — F33.42 RECURRENT MAJOR DEPRESSIVE DISORDER, IN FULL REMISSION (HCC): ICD-10-CM

## 2023-08-03 RX ORDER — DULOXETIN HYDROCHLORIDE 30 MG/1
CAPSULE, DELAYED RELEASE ORAL
Qty: 30 CAPSULE | Refills: 6 | OUTPATIENT
Start: 2023-08-03

## 2023-08-07 DIAGNOSIS — F33.42 RECURRENT MAJOR DEPRESSIVE DISORDER, IN FULL REMISSION (HCC): ICD-10-CM

## 2023-08-07 RX ORDER — DULOXETIN HYDROCHLORIDE 30 MG/1
30 CAPSULE, DELAYED RELEASE ORAL DAILY
Qty: 30 CAPSULE | Refills: 6 | Status: SHIPPED | OUTPATIENT
Start: 2023-08-07

## 2023-08-28 DIAGNOSIS — Z30.41 ORAL CONTRACEPTIVE PILL SURVEILLANCE: ICD-10-CM

## 2023-08-28 RX ORDER — NORETHINDRONE 0.35 MG/1
TABLET ORAL
Qty: 84 TABLET | Refills: 1 | Status: SHIPPED | OUTPATIENT
Start: 2023-08-28

## 2023-09-28 ENCOUNTER — OFFICE VISIT (OUTPATIENT)
Dept: FAMILY MEDICINE CLINIC | Facility: CLINIC | Age: 33
End: 2023-09-28
Payer: COMMERCIAL

## 2023-09-28 VITALS
HEART RATE: 99 BPM | DIASTOLIC BLOOD PRESSURE: 80 MMHG | TEMPERATURE: 97.8 F | OXYGEN SATURATION: 99 % | WEIGHT: 179.6 LBS | HEIGHT: 64 IN | SYSTOLIC BLOOD PRESSURE: 128 MMHG | BODY MASS INDEX: 30.66 KG/M2

## 2023-09-28 DIAGNOSIS — G35 MULTIPLE SCLEROSIS (HCC): ICD-10-CM

## 2023-09-28 DIAGNOSIS — Z00.00 ANNUAL PHYSICAL EXAM: Primary | ICD-10-CM

## 2023-09-28 DIAGNOSIS — F33.42 RECURRENT MAJOR DEPRESSIVE DISORDER, IN FULL REMISSION (HCC): ICD-10-CM

## 2023-09-28 DIAGNOSIS — E28.2 PCOS (POLYCYSTIC OVARIAN SYNDROME): ICD-10-CM

## 2023-09-28 DIAGNOSIS — D68.59 ANTITHROMBIN 3 DEFICIENCY (HCC): ICD-10-CM

## 2023-09-28 DIAGNOSIS — F17.210 SMOKING GREATER THAN 10 PACK YEARS: ICD-10-CM

## 2023-09-28 PROCEDURE — 99395 PREV VISIT EST AGE 18-39: CPT | Performed by: NURSE PRACTITIONER

## 2023-09-28 RX ORDER — DULOXETIN HYDROCHLORIDE 30 MG/1
30 CAPSULE, DELAYED RELEASE ORAL DAILY
Qty: 90 CAPSULE | Refills: 3 | Status: SHIPPED | OUTPATIENT
Start: 2023-09-28 | End: 2024-09-27

## 2023-09-28 NOTE — ASSESSMENT & PLAN NOTE
Has a history of loss of feeling in her face and is taking the krill oil and tumeric last eval and f/u in 2021 with neurology and declines referral at this point

## 2023-09-28 NOTE — PROGRESS NOTES
[address]    NAME: Ian Mercer  AGE: 33 y.o. 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 Appropriate education was printed on patient's after visit summary. Counseling:  Injury prevention: discussed safety/seat belts, safety helmets, smoke detectors, carbon dioxide detectors, and smoking near bedding or upholstery. Exercise: the importance of regular exercise/physical activity was discussed. Recommend exercise 3-5 times per week for at least 30 minutes. BMI Counseling: Body mass index is 30.83 kg/m². The BMI is above normal. Nutrition recommendations include decreasing portion sizes, encouraging healthy choices of fruits and vegetables, decreasing fast food intake, consuming healthier snacks, limiting drinks that contain sugar, moderation in carbohydrate intake, increasing intake of lean protein, reducing intake of saturated and trans fat and reducing intake of cholesterol.  Exercise recommendations include moderate physical activity 150 minutes/week. No pharmacotherapy was ordered. Patient referred to PCP. Rationale for BMI follow-up plan is due to patient being overweight or obese. Tobacco Cessation Counseling: Tobacco cessation counseling was provided. The patient is sincerely urged to quit consumption of tobacco. She is not ready to quit tobacco.         Return in 1 year (on 2024). Chief Complaint:     Chief Complaint   Patient presents with   • Annual Exam     Needs medication refilled- review labs      History of Present Illness:     Adult Annual Physical   Patient here for a comprehensive physical exam. The patient reports no problems. Patient here today for her check up and reports that she recently had COVID and has recovered from in early September and she has since recovered. Patient has no new complaints     Diet and Physical Activity  Diet/Nutrition: well balanced diet. Exercise: walking. Depression Screening  PHQ-2/9 Depression Screening    Little interest or pleasure in doing things: 0 - not at all  Feeling down, depressed, or hopeless: 0 - not at all  Trouble falling or staying asleep, or sleeping too much: 0 - not at all  Feeling tired or having little energy: 0 - not at all  Poor appetite or overeatin - not at all  Feeling bad about yourself - or that you are a failure or have let yourself or your family down: 0 - not at all  Trouble concentrating on things, such as reading the newspaper or watching television: 0 - not at all  Moving or speaking so slowly that other people could have noticed. Or the opposite - being so fidgety or restless that you have been moving around a lot more than usual: 0 - not at all  Thoughts that you would be better off dead, or of hurting yourself in some way: 0 - not at all  PHQ-9 Score: 0   PHQ-9 Interpretation: No or Minimal depression        General Health  Sleep: sleeps well.    Hearing: normal - bilateral.  Vision: goes for regular eye exams, most recent eye exam <1 year ago and wears glasses. Dental: regular dental visits. /GYN Health  Last menstrual period: 8/28/2023  Contraceptive method: oral contraceptives. History of STDs?: no.     Review of Systems:     Review of Systems   Constitutional: Negative for activity change, appetite change, chills, diaphoresis, fatigue, fever and unexpected weight change. HENT: Negative for congestion, ear pain, hearing loss, postnasal drip, sinus pressure, sinus pain, sneezing and sore throat. Eyes: Negative for pain, redness and visual disturbance. Respiratory: Negative for cough and shortness of breath. Smoking about 1 pack a day for the past year    Cardiovascular: Negative for chest pain and leg swelling. Gastrointestinal: Negative for abdominal pain, diarrhea, nausea and vomiting. Endocrine: Negative. Genitourinary: Negative. Negative for difficulty urinating. Musculoskeletal: Negative for arthralgias. Allergic/Immunologic: Negative. Neurological: Negative for dizziness and light-headedness. Hematological: Negative. Psychiatric/Behavioral: Negative for behavioral problems and dysphoric mood. Past Medical History:     Past Medical History:   Diagnosis Date   • Abnormal Pap smear of cervix 2015   • Depression    • Multiple sclerosis ([facility])    • Opioid abuse, in remission [facility])    • Polycystic ovary syndrome    • Substance abuse ([facility])       Past Surgical History:     No past surgical history on file.    Social History:     Social History     Socioeconomic History   • Marital status: Single     Spouse name: None   • Number of children: None   • Years of education: None   • Highest education level: None   Occupational History   • None   Tobacco Use   • Smoking status: Every Day     Packs/day: 1.00     Years: 10.00     Total pack years: 10.00     Types: Cigarettes   • Smokeless tobacco: Never   Vaping Use   • Vaping Use: Every day   • Substances: Nicotine   Substance and Sexual Activity   • Alcohol use: Not Currently   • Drug use: Not Currently     Types: Heroin, Oxycodone   • Sexual activity: Not Currently     Partners: Male     Birth control/protection: Condom Male   Other Topics Concern   • None   Social History Narrative   • None     Social Determinants of Health     Financial Resource Strain: Not on file   Food Insecurity: Not on file   Transportation Needs: Not on file   Physical Activity: Not on file   Stress: Not on file   Social Connections: Not on file   Intimate Partner Violence: Not on file   Housing Stability: Not on file      Family History:     Family History   Problem Relation Age of Onset   • Hypertension Mother    • Anxiety disorder Mother    • Deep vein thrombosis Father    • Pulmonary embolism Father    • Cancer Paternal Grandmother       Current Medications:     Current Outpatient Medications   Medication Sig Dispense Refill   • cholecalciferol (VITAMIN D3) 400 units tablet Take 400 Units by mouth daily     • DULoxetine (CYMBALTA) 30 mg delayed release capsule Take 1 capsule (30 mg total) by mouth daily 90 capsule 3   • Jencycla 0.35 MG tablet take 1 tablet by mouth once daily 84 tablet 1   • KRILL OIL PO Take by mouth     • Multiple Vitamin (MULTI VITAMIN DAILY PO) Take by mouth     • Probiotic Product (PROBIOTIC-10 PO) Take by mouth     • Turmeric 500 MG CAPS Take by mouth       No current facility-administered medications for this visit. Allergies: Allergies   Allergen Reactions   • Iodine - Food Allergy Hives   • Shrimp Flavor - Food Allergy Hives   • Lovenox [Enoxaparin] Rash      Physical Exam:     /80 (BP Location: Left arm, Patient Position: Sitting)   Pulse 99   Temp 97.8 °F (36.6 °C) (Temporal)   Ht 5' 4" (1.626 m)   Wt 81.5 kg (179 lb 9.6 oz)   SpO2 99%   BMI 30.83 kg/m²     Physical Exam  Vitals and nursing note reviewed. Constitutional:       General: She is not in acute distress. Appearance: She is well-developed.    HENT:      Head: Normocephalic and atraumatic. Right Ear: Tympanic membrane normal.      Left Ear: Tympanic membrane normal.      Nose: Nose normal.      Mouth/Throat:      Mouth: Mucous membranes are moist.   Eyes:      Conjunctiva/sclera: Conjunctivae normal.   Cardiovascular:      Rate and Rhythm: Normal rate and regular rhythm. Heart sounds: No murmur heard. Pulmonary:      Effort: Pulmonary effort is normal. No respiratory distress. Breath sounds: Normal breath sounds. Abdominal:      Palpations: Abdomen is soft. Tenderness: There is no abdominal tenderness. Musculoskeletal:         General: No swelling. Cervical back: Neck supple. Skin:     General: Skin is warm and dry. Capillary Refill: Capillary refill takes less than 2 seconds. Neurological:      General: No focal deficit present. Mental Status: She is alert and oriented to person, place, and time.    Psychiatric:         Mood and Affect: Mood normal.          Sherita Pratt, [facility]

## 2023-11-16 ENCOUNTER — OFFICE VISIT (OUTPATIENT)
Dept: FAMILY MEDICINE CLINIC | Facility: CLINIC | Age: 33
End: 2023-11-16
Payer: COMMERCIAL

## 2023-11-16 ENCOUNTER — TELEPHONE (OUTPATIENT)
Dept: FAMILY MEDICINE CLINIC | Facility: CLINIC | Age: 33
End: 2023-11-16

## 2023-11-16 VITALS
HEART RATE: 88 BPM | OXYGEN SATURATION: 98 % | SYSTOLIC BLOOD PRESSURE: 122 MMHG | DIASTOLIC BLOOD PRESSURE: 78 MMHG | HEIGHT: 64 IN | WEIGHT: 157 LBS | TEMPERATURE: 97.8 F | BODY MASS INDEX: 26.8 KG/M2

## 2023-11-16 DIAGNOSIS — F41.9 ANXIETY: Primary | ICD-10-CM

## 2023-11-16 DIAGNOSIS — K02.9 DENTAL CARIES: ICD-10-CM

## 2023-11-16 DIAGNOSIS — R42 DIZZINESS: ICD-10-CM

## 2023-11-16 DIAGNOSIS — B37.9 CANDIDIASIS: ICD-10-CM

## 2023-11-16 DIAGNOSIS — G35 MULTIPLE SCLEROSIS (HCC): Primary | ICD-10-CM

## 2023-11-16 PROCEDURE — 99214 OFFICE O/P EST MOD 30 MIN: CPT | Performed by: NURSE PRACTITIONER

## 2023-11-16 RX ORDER — ALPRAZOLAM 0.5 MG/1
TABLET ORAL
Qty: 2 TABLET | Refills: 0 | Status: SHIPPED | OUTPATIENT
Start: 2023-11-16

## 2023-11-16 RX ORDER — FLUCONAZOLE 150 MG/1
150 TABLET ORAL ONCE
Qty: 1 TABLET | Refills: 0 | Status: SHIPPED | OUTPATIENT
Start: 2023-11-16 | End: 2023-11-16

## 2023-11-16 RX ORDER — PENICILLIN V POTASSIUM 500 MG/1
500 TABLET ORAL EVERY 8 HOURS SCHEDULED
Qty: 21 TABLET | Refills: 0 | Status: SHIPPED | OUTPATIENT
Start: 2023-11-16 | End: 2023-11-23

## 2023-11-16 NOTE — PROGRESS NOTES
Name: Shelby Guzman      : 1990      MRN: [id number]  Encounter Provider: WINSOME Schmitt  Encounter Date: 2023   Encounter department: [address] [place] [address]     1. Multiple sclerosis ([address])  -     MRI brain w wo contrast; Future; Expected date: 2023    2. Dizziness  -     MRI brain w wo contrast; Future; Expected date: 2023    3. Dental caries  -     penicillin V potassium (VEETID) 500 mg tablet; Take 1 tablet (500 mg total) by mouth every 8 (eight) hours for 7 days  -     fluconazole (DIFLUCAN) 150 mg tablet; Take 1 tablet (150 mg total) by mouth once for 1 dose    4. Candidiasis  -     fluconazole (DIFLUCAN) 150 mg tablet; Take 1 tablet (150 mg total) by mouth once for 1 dose           Subjective      Patient here today and reports that this week she has noticed that she having some dizziness this week having more dizziness and no falls. She had an illness a few weeks ago and left with a linger cough. Patient also noticed that she had two lymph nodes that were tender when she was touching the areas positive sick contacts with mom. Prior to this she had COVID in September       Review of Systems   Constitutional:  Positive for fatigue. Negative for activity change, appetite change, chills, diaphoresis, fever and unexpected weight change. HENT:  Positive for dental problem. Negative for congestion, ear pain, hearing loss, postnasal drip, sinus pressure, sinus pain, sneezing and sore throat. Eyes:  Negative for pain, redness and visual disturbance. Respiratory:  Positive for cough. Negative for shortness of breath. Cardiovascular:  Negative for chest pain and leg swelling. Gastrointestinal:  Positive for nausea. Negative for abdominal pain, diarrhea and vomiting. Endocrine: Negative. Genitourinary: Negative. Musculoskeletal:  Negative for arthralgias. Allergic/Immunologic: Negative. Neurological:  Positive for dizziness. Negative for light-headedness. Hematological: Negative. Psychiatric/Behavioral:  Negative for behavioral problems and dysphoric mood. Current Outpatient Medications on File Prior to Visit   Medication Sig   • cholecalciferol (VITAMIN D3) 400 units tablet Take 400 Units by mouth daily   • Jencycla 0.35 MG tablet take 1 tablet by mouth once daily   • KRILL OIL PO Take by mouth   • Multiple Vitamin (MULTI VITAMIN DAILY PO) Take by mouth   • Probiotic Product (PROBIOTIC-10 PO) Take by mouth   • Turmeric 500 MG CAPS Take by mouth   • DULoxetine (CYMBALTA) 30 mg delayed release capsule Take 1 capsule (30 mg total) by mouth daily       Objective     /78 (BP Location: Right arm, Patient Position: Sitting)   Pulse 88   Temp 97.8 °F (36.6 °C) (Temporal)   Ht 5' 4" (1.626 m)   Wt 71.2 kg (157 lb)   SpO2 98%   BMI 26.95 kg/m²     Physical Exam  Constitutional:       General: She is not in acute distress. Appearance: She is well-developed. HENT:      Head: Normocephalic and atraumatic. Right Ear: Tympanic membrane normal.      Left Ear: Tympanic membrane normal.      Nose: Congestion present. Mouth/Throat:      Lips: Pink. Mouth: Mucous membranes are moist.     Eyes:      General: Lids are normal.      Pupils: Pupils are equal, round, and reactive to light. Neck:      Thyroid: No thyromegaly. Cardiovascular:      Rate and Rhythm: Normal rate and regular rhythm. Heart sounds: Normal heart sounds. No murmur heard. Pulmonary:      Effort: Pulmonary effort is normal. No respiratory distress. Breath sounds: Normal breath sounds. No wheezing. Abdominal:      General: Bowel sounds are normal.      Palpations: Abdomen is soft. Musculoskeletal:         General: Normal range of motion. Cervical back: Normal range of motion. Skin:     General: Skin is warm and dry. Neurological:      General: No focal deficit present.       Mental Status: She is alert and oriented to person, place, and time. GCS: GCS eye subscore is 4. GCS verbal subscore is 5. GCS motor subscore is 6.    Psychiatric:         Mood and Affect: Mood normal.       Reyes Abu, CRNP

## 2023-11-16 NOTE — TELEPHONE ENCOUNTER
Pt saw Luis Enrique Velasquez today. . she ordered an MRI brain. . patient forgot to ask Luis Enrique Velasquez if she wouldl prescribe XANAX for her, that she can take when she has to get the MRI ? ?

## 2023-11-30 ENCOUNTER — HOSPITAL ENCOUNTER (OUTPATIENT)
Dept: MRI IMAGING | Facility: HOSPITAL | Age: 33
End: 2023-11-30
Payer: COMMERCIAL

## 2023-11-30 DIAGNOSIS — G35 MULTIPLE SCLEROSIS (HCC): ICD-10-CM

## 2023-11-30 DIAGNOSIS — R42 DIZZINESS: ICD-10-CM

## 2023-11-30 PROCEDURE — 70553 MRI BRAIN STEM W/O & W/DYE: CPT

## 2023-11-30 PROCEDURE — A9585 GADOBUTROL INJECTION: HCPCS | Performed by: NURSE PRACTITIONER

## 2023-11-30 PROCEDURE — G1004 CDSM NDSC: HCPCS

## 2023-11-30 RX ORDER — GADOBUTROL 604.72 MG/ML
7 INJECTION INTRAVENOUS
Status: COMPLETED | OUTPATIENT
Start: 2023-11-30 | End: 2023-11-30

## 2023-11-30 RX ADMIN — GADOBUTROL 7 ML: 604.72 INJECTION INTRAVENOUS at 06:37

## 2023-12-04 DIAGNOSIS — G35 MULTIPLE SCLEROSIS (HCC): Primary | ICD-10-CM

## 2023-12-05 ENCOUNTER — APPOINTMENT (OUTPATIENT)
Dept: RADIOLOGY | Facility: HOSPITAL | Age: 33
DRG: 059 | End: 2023-12-05
Payer: COMMERCIAL

## 2023-12-05 ENCOUNTER — HOSPITAL ENCOUNTER (INPATIENT)
Facility: HOSPITAL | Age: 33
LOS: 5 days | Discharge: HOME | DRG: 059 | End: 2023-12-10
Attending: EMERGENCY MEDICINE | Admitting: PSYCHIATRY & NEUROLOGY
Payer: COMMERCIAL

## 2023-12-05 DIAGNOSIS — G35: ICD-10-CM

## 2023-12-05 DIAGNOSIS — G35 MULTIPLE SCLEROSIS (HCC): Primary | ICD-10-CM

## 2023-12-05 LAB
ALBUMIN SERPL BCP-MCNC: 4.1 G/DL (ref 3.5–5)
ALP SERPL-CCNC: 49 U/L (ref 34–104)
ALT SERPL W P-5'-P-CCNC: 13 U/L (ref 7–52)
AMORPH URATE CRY URNS QL MICRO: ABNORMAL
ANION GAP SERPL CALCULATED.3IONS-SCNC: 6 MMOL/L
AST SERPL W P-5'-P-CCNC: 14 U/L (ref 13–39)
BACTERIA UR QL AUTO: ABNORMAL /HPF
BASOPHILS # BLD AUTO: 0.05 THOUSANDS/ÂΜL (ref 0–0.1)
BASOPHILS NFR BLD AUTO: 1 % (ref 0–1)
BILIRUB SERPL-MCNC: 0.4 MG/DL (ref 0.2–1)
BILIRUB UR QL STRIP: NEGATIVE
BUN SERPL-MCNC: 12 MG/DL (ref 5–25)
CALCIUM SERPL-MCNC: 8.7 MG/DL (ref 8.4–10.2)
CHLORIDE SERPL-SCNC: 109 MMOL/L (ref 96–108)
CLARITY UR: CLEAR
CO2 SERPL-SCNC: 25 MMOL/L (ref 21–32)
COLOR UR: ABNORMAL
CREAT SERPL-MCNC: 0.77 MG/DL (ref 0.6–1.3)
EOSINOPHIL # BLD AUTO: 0.15 THOUSAND/ÂΜL (ref 0–0.61)
EOSINOPHIL NFR BLD AUTO: 3 % (ref 0–6)
ERYTHROCYTE [DISTWIDTH] IN BLOOD BY AUTOMATED COUNT: 12.2 % (ref 11.6–15.1)
EXT PREGNANCY TEST URINE: NEGATIVE
EXT. CONTROL: NORMAL
GFR SERPL CREATININE-BSD FRML MDRD: 102 ML/MIN/1.73SQ M
GLUCOSE SERPL-MCNC: 113 MG/DL (ref 65–140)
GLUCOSE SERPL-MCNC: 75 MG/DL (ref 65–140)
GLUCOSE SERPL-MCNC: 90 MG/DL (ref 65–140)
GLUCOSE UR STRIP-MCNC: NEGATIVE MG/DL
HCT VFR BLD AUTO: 44 % (ref 34.8–46.1)
HGB BLD-MCNC: 15.6 G/DL (ref 11.5–15.4)
HGB UR QL STRIP.AUTO: ABNORMAL
IMM GRANULOCYTES # BLD AUTO: 0.02 THOUSAND/UL (ref 0–0.2)
IMM GRANULOCYTES NFR BLD AUTO: 0 % (ref 0–2)
KETONES UR STRIP-MCNC: NEGATIVE MG/DL
LEUKOCYTE ESTERASE UR QL STRIP: NEGATIVE
LYMPHOCYTES # BLD AUTO: 1.8 THOUSANDS/ÂΜL (ref 0.6–4.47)
LYMPHOCYTES NFR BLD AUTO: 32 % (ref 14–44)
MAGNESIUM SERPL-MCNC: 1.8 MG/DL (ref 1.9–2.7)
MCH RBC QN AUTO: 33.4 PG (ref 26.8–34.3)
MCHC RBC AUTO-ENTMCNC: 35.5 G/DL (ref 31.4–37.4)
MCV RBC AUTO: 94 FL (ref 82–98)
MONOCYTES # BLD AUTO: 0.52 THOUSAND/ÂΜL (ref 0.17–1.22)
MONOCYTES NFR BLD AUTO: 9 % (ref 4–12)
NEUTROPHILS # BLD AUTO: 3.13 THOUSANDS/ÂΜL (ref 1.85–7.62)
NEUTS SEG NFR BLD AUTO: 55 % (ref 43–75)
NITRITE UR QL STRIP: NEGATIVE
NON-SQ EPI CELLS URNS QL MICRO: ABNORMAL /HPF
NRBC BLD AUTO-RTO: 0 /100 WBCS
PH UR STRIP.AUTO: 6.5 [PH]
PHOSPHATE SERPL-MCNC: 3 MG/DL (ref 2.7–4.5)
PLATELET # BLD AUTO: 225 THOUSANDS/UL (ref 149–390)
PMV BLD AUTO: 9.9 FL (ref 8.9–12.7)
POTASSIUM SERPL-SCNC: 3.7 MMOL/L (ref 3.5–5.3)
PROT SERPL-MCNC: 6.6 G/DL (ref 6.4–8.4)
PROT UR STRIP-MCNC: NEGATIVE MG/DL
RBC # BLD AUTO: 4.67 MILLION/UL (ref 3.81–5.12)
RBC #/AREA URNS AUTO: ABNORMAL /HPF
SODIUM SERPL-SCNC: 140 MMOL/L (ref 135–147)
SP GR UR STRIP.AUTO: 1.01 (ref 1–1.03)
TSH SERPL DL<=0.05 MIU/L-ACNC: 1.87 UIU/ML (ref 0.45–4.5)
UROBILINOGEN UR STRIP-ACNC: <2 MG/DL
WBC # BLD AUTO: 5.67 THOUSAND/UL (ref 4.31–10.16)
WBC #/AREA URNS AUTO: ABNORMAL /HPF

## 2023-12-05 PROCEDURE — 72157 MRI CHEST SPINE W/O & W/DYE: CPT

## 2023-12-05 PROCEDURE — 99284 EMERGENCY DEPT VISIT MOD MDM: CPT

## 2023-12-05 PROCEDURE — 99223 1ST HOSP IP/OBS HIGH 75: CPT | Performed by: PSYCHIATRY & NEUROLOGY

## 2023-12-05 PROCEDURE — 80053 COMPREHEN METABOLIC PANEL: CPT | Performed by: PSYCHIATRY & NEUROLOGY

## 2023-12-05 PROCEDURE — 36415 COLL VENOUS BLD VENIPUNCTURE: CPT | Performed by: PSYCHIATRY & NEUROLOGY

## 2023-12-05 PROCEDURE — NC001 PR NO CHARGE: Performed by: PSYCHIATRY & NEUROLOGY

## 2023-12-05 PROCEDURE — 72156 MRI NECK SPINE W/O & W/DYE: CPT

## 2023-12-05 PROCEDURE — 83735 ASSAY OF MAGNESIUM: CPT | Performed by: PSYCHIATRY & NEUROLOGY

## 2023-12-05 PROCEDURE — 85025 COMPLETE CBC W/AUTO DIFF WBC: CPT | Performed by: PSYCHIATRY & NEUROLOGY

## 2023-12-05 PROCEDURE — 84443 ASSAY THYROID STIM HORMONE: CPT | Performed by: PSYCHIATRY & NEUROLOGY

## 2023-12-05 PROCEDURE — 81001 URINALYSIS AUTO W/SCOPE: CPT | Performed by: PSYCHIATRY & NEUROLOGY

## 2023-12-05 PROCEDURE — 82948 REAGENT STRIP/BLOOD GLUCOSE: CPT

## 2023-12-05 PROCEDURE — 81025 URINE PREGNANCY TEST: CPT | Performed by: PSYCHIATRY & NEUROLOGY

## 2023-12-05 PROCEDURE — 99285 EMERGENCY DEPT VISIT HI MDM: CPT | Performed by: EMERGENCY MEDICINE

## 2023-12-05 PROCEDURE — 84100 ASSAY OF PHOSPHORUS: CPT | Performed by: PSYCHIATRY & NEUROLOGY

## 2023-12-05 PROCEDURE — 72158 MRI LUMBAR SPINE W/O & W/DYE: CPT

## 2023-12-05 RX ORDER — LORAZEPAM 0.5 MG/1
0.5 TABLET ORAL EVERY 12 HOURS PRN
Status: DISCONTINUED | OUTPATIENT
Start: 2023-12-05 | End: 2023-12-09

## 2023-12-05 RX ORDER — INSULIN LISPRO 100 [IU]/ML
1-5 INJECTION, SOLUTION INTRAVENOUS; SUBCUTANEOUS
Status: DISCONTINUED | OUTPATIENT
Start: 2023-12-05 | End: 2023-12-10 | Stop reason: HOSPADM

## 2023-12-05 RX ORDER — SACCHAROMYCES BOULARDII 250 MG
250 CAPSULE ORAL 2 TIMES DAILY
Status: DISCONTINUED | OUTPATIENT
Start: 2023-12-05 | End: 2023-12-10 | Stop reason: HOSPADM

## 2023-12-05 RX ORDER — VIT C/B6/B5/MAGNESIUM/HERB 173 50-5-6-5MG
500 CAPSULE ORAL DAILY
Status: DISCONTINUED | OUTPATIENT
Start: 2023-12-05 | End: 2023-12-05

## 2023-12-05 RX ORDER — PANTOPRAZOLE SODIUM 40 MG/1
40 TABLET, DELAYED RELEASE ORAL
Status: DISPENSED | OUTPATIENT
Start: 2023-12-05 | End: 2023-12-10

## 2023-12-05 RX ORDER — ONDANSETRON 2 MG/ML
4 INJECTION INTRAMUSCULAR; INTRAVENOUS EVERY 6 HOURS PRN
Status: DISCONTINUED | OUTPATIENT
Start: 2023-12-05 | End: 2023-12-10 | Stop reason: HOSPADM

## 2023-12-05 RX ORDER — NICOTINE 21 MG/24HR
1 PATCH, TRANSDERMAL 24 HOURS TRANSDERMAL DAILY
Status: DISCONTINUED | OUTPATIENT
Start: 2023-12-05 | End: 2023-12-10 | Stop reason: HOSPADM

## 2023-12-05 RX ORDER — OMEGA-3S/DHA/EPA/FISH OIL/D3 300MG-1000
400 CAPSULE ORAL DAILY
Status: DISCONTINUED | OUTPATIENT
Start: 2023-12-05 | End: 2023-12-10 | Stop reason: HOSPADM

## 2023-12-05 RX ORDER — MAGNESIUM SULFATE HEPTAHYDRATE 40 MG/ML
2 INJECTION, SOLUTION INTRAVENOUS ONCE
Status: COMPLETED | OUTPATIENT
Start: 2023-12-05 | End: 2023-12-05

## 2023-12-05 RX ORDER — LANOLIN ALCOHOL/MO/W.PET/CERES
3 CREAM (GRAM) TOPICAL
Status: DISCONTINUED | OUTPATIENT
Start: 2023-12-05 | End: 2023-12-10 | Stop reason: HOSPADM

## 2023-12-05 RX ORDER — LORAZEPAM 2 MG/ML
0.5 INJECTION INTRAMUSCULAR ONCE AS NEEDED
Status: COMPLETED | OUTPATIENT
Start: 2023-12-05 | End: 2023-12-05

## 2023-12-05 RX ORDER — DULOXETIN HYDROCHLORIDE 30 MG/1
30 CAPSULE, DELAYED RELEASE ORAL DAILY
Status: DISCONTINUED | OUTPATIENT
Start: 2023-12-05 | End: 2023-12-10 | Stop reason: HOSPADM

## 2023-12-05 RX ORDER — ACETAMINOPHEN 325 MG/1
650 TABLET ORAL EVERY 6 HOURS PRN
Status: DISCONTINUED | OUTPATIENT
Start: 2023-12-05 | End: 2023-12-10 | Stop reason: HOSPADM

## 2023-12-05 RX ORDER — POLYETHYLENE GLYCOL 3350 17 G/17G
17 POWDER, FOR SOLUTION ORAL DAILY
Status: DISCONTINUED | OUTPATIENT
Start: 2023-12-05 | End: 2023-12-10 | Stop reason: HOSPADM

## 2023-12-05 RX ADMIN — LORAZEPAM 0.5 MG: 2 INJECTION INTRAMUSCULAR; INTRAVENOUS at 23:18

## 2023-12-05 RX ADMIN — MELATONIN TAB 3 MG 3 MG: 3 TAB at 21:03

## 2023-12-05 RX ADMIN — MAGNESIUM SULFATE HEPTAHYDRATE 2 G: 40 INJECTION, SOLUTION INTRAVENOUS at 13:51

## 2023-12-05 RX ADMIN — Medication 250 MG: at 21:03

## 2023-12-05 RX ADMIN — ACETAMINOPHEN 650 MG: 325 TABLET, FILM COATED ORAL at 20:51

## 2023-12-05 RX ADMIN — DULOXETINE HYDROCHLORIDE 30 MG: 30 CAPSULE, DELAYED RELEASE ORAL at 11:17

## 2023-12-05 RX ADMIN — CHOLECALCIFEROL TAB 10 MCG (400 UNIT) 400 UNITS: 10 TAB at 11:17

## 2023-12-05 RX ADMIN — SODIUM CHLORIDE 1000 MG: 0.9 INJECTION, SOLUTION INTRAVENOUS at 12:02

## 2023-12-05 RX ADMIN — LORAZEPAM 0.5 MG: 0.5 TABLET ORAL at 21:03

## 2023-12-05 RX ADMIN — POLYETHYLENE GLYCOL 3350 17 G: 17 POWDER, FOR SOLUTION ORAL at 11:18

## 2023-12-05 RX ADMIN — B-COMPLEX W/ C & FOLIC ACID TAB 1 TABLET: TAB at 11:16

## 2023-12-05 NOTE — LETTER
[address] [place] [address] 6  [address] [facility]  Saloni Garza [id number]  Dept: [phone]    December 10, 2023     Patient: Mian Jhaveri   YOB: 1990   Date of Visit: 12/5/2023       To Whom it May Concern:    Mian Jhaveri is under my professional care. She was seen in the hospital from 12/5/2023 to 12/10/23. She may return to work on Wednesday December 13, 2023 without limitations. If you have any questions or concerns, please don't hesitate to call.          Sincerely,          Rosebud Harada, DO

## 2023-12-05 NOTE — ASSESSMENT & PLAN NOTE
- Noted on thrombosis panel 9/2022 at [facility] that was obtained due to a family history of clotting disorders  - Not on AC PTA  - Pt has had a reaction to lovenox PTA, will hold off on pharmacological dvt prophylaxis and continue with mechanical prophylaxis and encourage frequent ambulation if safe

## 2023-12-05 NOTE — PROGRESS NOTES
NEUROLOGY RESIDENCY - ADMISSION H&P NOTE     Name: Cherise Mallory   Age & Sex: 29 y.o. female   MRN: [id number]  Unit/Bed#: ED 13   Encounter: [id number]      Anticipated Length of Stay:  Patient will be admitted on an Inpatient basis with an anticipated length of stay of  2 midnights. Justification for Hospital Stay: MS workup and management    Cherise Mallory will need follow up in 6 weeks with multiple sclerosis attending . She will not require outpatient neurological testing. Pending for discharge: MS workup     ASSESSMENT & PLAN     * Multiple sclerosis [facility])  Assessment & Plan  Assessment:  Cherise Mallory is a 29 y.o. female with a PMHx of MS who started having symptoms of vertigo in the past week. Initial symptoms were visual symptoms including diplopia. These symptoms were sudden in onset over week and lasted 7 days. Symptoms did fully resolve. Since then, they developed new symptoms of urinary tract symptoms including urge incontinence. Previous evaluation has included MRI of brain, CSF analysis, and Lyme antibody. MRI in 2019 showed multiple foci of T2 hyperintensity identified in the supratentorial white matter compatible with MS disease. Lyme negative at the time. Prior treatments include: {RX SPECIALTY MULTIPLE SCLEROSIS MEDICATIONS:[id number]    Impression:  Patient with a PMHx of MS is likely presenting with a MS flare up.     Plan:  - Solu-Medrol 1g IV daily for 5 days   - PT/OT evaluation  - Outpatient MRI w/ and w/ out contrast shows new hyperintense lesions on T2/Flair  - MRI C, T and L spine pending    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(HCC)  Assessment & Plan  - Noted on thrombosis panel 9/2022 at [facility] that was obtained due to a family history of clotting disorders  - Not on AC PTA  - Pt has had a reaction to lovenox PTA will hold off on pharmacological dvt prophylaxis and continue with mechanical prophylaxis and encourage frequent ambulation if safe    PCOS (polycystic ovarian syndrome)  Assessment & Plan  - History of PCOS  - Pt maintained on Jencycla (Norethisterone) 0.35mg daily PTA - This has been Ok'd for use by OB-GYN in the setting of her ATIII deficiency    Recurrent major depressive disorder, in full remission ([address])  Assessment & Plan  - Pt maintained on duloxetine 30mg qd PTA, continue  - Ativan PRN for breakthrough anxiety, pt reports she is very anxious about her MS flare        VTE Prophylaxis: Ambulate as tolerated  / sequential compression device   Code Status: Level 1 - full code  POLST: POLST form is not discussed and not completed at this time. CHIEF COMPLAINT     Chief Complaint   Patient presents with    Abnormal Lab     Pt states she recently had an MRI and was told to come to the ED d/t abnormal MRI findings. Pt c/o vertigo symtpoms        HISTORY OF PRESENT ILLNESS     David Diaz is a 29 y.o. female  with pertinent history of MS who presented for vertigo that began 1 week prior. Longest episode of vertigo lasted 2 days and pt has not had symptoms since Saturday. Pt initially saw her PCP for this and MRI was ordered. Patient was told to come to ED due to new hyperintense lesions seen on MRI. Patient admits to also feeling very fatigued for the last 3 weeks along with intermittent diplopia. Pt was diagnosed with MS in 2019 when she presented to ED with acute face numbness and MRI showed possible demyelinating signals. Patient saw neurology which trialed her on Copaxone. Pt experienced infection after initiating this medication and stopped taking it. Since then, pt has not been on medications for MS. Since her diagnosis, patient says she has not noticed any symptoms up until now. Most recently, patient complains of unable to control urination. Pt admits to numbness in her fingers as a teenager. Pt currently denies dizziness, nausea, vertigo, headache, diplopia, chest pain, and impaired coordination.      REVIEW OF SYSTEMS     Review of Systems   Constitutional: Positive for fatigue. Negative for chills and fever. HENT:  Negative for facial swelling, hearing loss and sinus pain. Eyes:  Positive for visual disturbance (diplopia). Negative for pain. Respiratory:  Negative for chest tightness and shortness of breath. Cardiovascular:  Negative for chest pain and palpitations. Gastrointestinal:  Negative for abdominal distention and abdominal pain. Neurological:  Negative for dizziness, light-headedness and headaches. All other systems reviewed and are negative. PAST MEDICAL HISTORY     Past Medical History:   Diagnosis Date    Abnormal Pap smear of cervix     Depression     Multiple sclerosis (HCC)     Opioid abuse, in remission ([address] [facility])     Polycystic ovary syndrome     Substance abuse ([address] [facility])        Allergies: Allergies   Allergen Reactions    Iodine - Food Allergy Hives    Shrimp Flavor - Food Allergy Hives    Lovenox [Enoxaparin] Rash       PAST SURGICAL HISTORY   History reviewed. No pertinent surgical history.     SOCIAL & FAMILY HISTORY     Social History     Substance and Sexual Activity   Alcohol Use Not Currently       Social History     Substance and Sexual Activity   Drug Use Not Currently    Types: Heroin, Oxycodone     Social History     Tobacco Use   Smoking Status Every Day    Packs/day: 1.00    Years: 10.00    Total pack years: 10.00    Types: Cigarettes   Smokeless Tobacco Never       Marital Status: Single     Family History:  Family History   Problem Relation Age of Onset    Hypertension Mother     Anxiety disorder Mother     Deep vein thrombosis Father     Pulmonary embolism Father     Cancer Paternal Grandmother            OBJECTIVE     Vitals:    23 1300 23 1400 23 1500 23 1600   BP: 123/71 119/76 123/75 123/79   BP Location: Right arm Right arm Right arm Right arm   Pulse: 78 70 72 76   Resp: 20 20 20 20   Temp:       TempSrc:       SpO2: 98% 97% 97% 98%        Temperature:   Temp (24hrs), Av.2 °F (36.8 °C), Min:98.2 °F (36.8 °C), Max:98.2 °F (36.8 °C)    Temperature: 98.2 °F (36.8 °C)    Intake & Output:  I/O       None            Weights: There is no height or weight on file to calculate BMI. Weight (last 2 days)       None            Physical Exam  Vitals reviewed. Constitutional:       Appearance: Normal appearance. HENT:      Head: Normocephalic and atraumatic. Nose: Nose normal.      Mouth/Throat:      Mouth: Mucous membranes are moist.      Pharynx: Oropharynx is clear. Eyes:      Extraocular Movements: Extraocular movements intact and EOM normal.      Pupils: Pupils are equal, round, and reactive to light. Pulmonary:      Effort: Pulmonary effort is normal.      Breath sounds: Normal breath sounds. Abdominal:      General: Abdomen is flat. Palpations: Abdomen is soft. Skin:     General: Skin is warm. Neurological:      Mental Status: She is alert and oriented to person, place, and time. Coordination: Finger-Nose-Finger Test normal.      Deep Tendon Reflexes:      Reflex Scores:       Tricep reflexes are 2+ on the right side and 2+ on the left side. Bicep reflexes are 2+ on the right side and 2+ on the left side. Brachioradialis reflexes are 2+ on the right side and 2+ on the left side. Patellar reflexes are 3+ on the right side and 3+ on the left side. Achilles reflexes are 3+ on the right side and 3+ on the left side. Psychiatric:         Mood and Affect: Mood normal.         Speech: Speech normal.          Neurologic Exam     Mental Status   Oriented to person, place, and time. Attention: normal. Concentration: normal.   Speech: speech is normal   Level of consciousness: alert  Knowledge: good. Cranial Nerves     CN II   Visual fields full to confrontation. CN III, IV, VI   Pupils are equal, round, and reactive to light. Extraocular motions are normal.   Right pupil: Consensual response: intact.    Left pupil: Consensual response: intact. CN III: no CN III palsy  CN VI: no CN VI palsy  Nystagmus: none   Diplopia: none    CN V   Facial sensation intact. CN VII   Facial expression full, symmetric. CN VIII   CN VIII normal.     CN IX, X   CN IX normal.   CN X normal.     CN XI   CN XI normal.     CN XII   CN XII normal.     Motor Exam   Muscle bulk: normal  Overall muscle tone: normal  Right arm pronator drift: Mild. Strength   Strength 5/5 except as noted. Sensory Exam   Light touch normal.   Pinprick normal.   Decreased sensation to temperature on posterior L forearm and around T8 on R side of back     Gait, Coordination, and Reflexes     Coordination   Finger to nose coordination: normal    Reflexes   Right brachioradialis: 2+  Left brachioradialis: 2+  Right biceps: 2+  Left biceps: 2+  Right triceps: 2+  Left triceps: 2+  Right patellar: 3+  Left patellar: 3+  Right achilles: 3+  Left achilles: 3+  Right plantar: normal  Left plantar: normal  Right Ritchie: absent  Left Ritchie: absent  Nonsustained ankle clonus (L - about 2 beats), no clonus in R ankle          LABORATORY DATA     Labs: I have personally reviewed pertinent reports. Results from last 7 days   Lab Units 12/05/23  1037   WBC Thousand/uL 5.67   HEMOGLOBIN g/dL 15.6*   HEMATOCRIT % 44.0   PLATELETS Thousands/uL 225   NEUTROS PCT % 55   MONOS PCT % 9   EOS PCT % 3      Results from last 7 days   Lab Units 12/05/23  1037   SODIUM mmol/L 140   POTASSIUM mmol/L 3.7   CHLORIDE mmol/L 109*   CO2 mmol/L 25   BUN mg/dL 12   CREATININE mg/dL 0.77   CALCIUM mg/dL 8.7   ALK PHOS U/L 49   ALT U/L 13   AST U/L 14     Results from last 7 days   Lab Units 12/05/23  1037   MAGNESIUM mg/dL 1.8*     Results from last 7 days   Lab Units 12/05/23  1037   PHOSPHORUS mg/dL 3.0                    Micro:  No results found for: "BLOODCX", "Rochele Lukes", "WOUNDCULT", "SPUTUMCULTUR"    IMAGING & DIAGNOSTIC TESTING     Radiology Results: I have personally reviewed pertinent reports.    and I have personally reviewed pertinent films in PACS    MRI inpatient order    (Results Pending)       Other Diagnostic Testing: I have personally reviewed pertinent reports. and I have personally reviewed pertinent films in PACS    ACTIVE MEDICATIONS     Current Facility-Administered Medications   Medication Dose Route Frequency    acetaminophen (TYLENOL) tablet 650 mg  650 mg Oral Q6H PRN    cholecalciferol (VITAMIN D3) tablet 400 Units  400 Units Oral Daily    DULoxetine (CYMBALTA) delayed release capsule 30 mg  30 mg Oral Daily    insulin lispro (HumaLOG) 100 units/mL subcutaneous injection 1-5 Units  1-5 Units Subcutaneous TID AC    LORazepam (ATIVAN) tablet 0.5 mg  0.5 mg Oral Q12H PRN    melatonin tablet 3 mg  3 mg Oral HS    methylPREDNISolone sodium succinate (Solu-MEDROL) 1,000 mg in sodium chloride 0.9 % 250 mL IVPB  1,000 mg Intravenous Daily    multivitamin stress formula tablet 1 tablet  1 tablet Oral Daily    nicotine (NICODERM CQ) 21 mg/24 hr TD 24 hr patch 1 patch  1 patch Transdermal Daily    ondansetron (ZOFRAN) injection 4 mg  4 mg Intravenous Q6H PRN    pantoprazole (PROTONIX) EC tablet 40 mg  40 mg Oral Early Morning    polyethylene glycol (MIRALAX) packet 17 g  17 g Oral Daily    saccharomyces boulardii (FLORASTOR) capsule 250 mg  250 mg Oral BID         HOME MEDICATIONS     Prior to Admission medications    Medication Sig Start Date End Date Taking?  Authorizing Provider   ALPRAZolam Ulysses Apley) 0.5 mg tablet Take 30 minutes prior to MRI may repeat x1 no driving if taking 07/96/57   WINSOME Carroll   cholecalciferol (VITAMIN D3) 400 units tablet Take 400 Units by mouth daily    Historical Provider, MD   DULoxetine (CYMBALTA) 30 mg delayed release capsule Take 1 capsule (30 mg total) by mouth daily 9/28/23 9/27/24  WINSOME Carroll   Jencycla 0.35 MG tablet take 1 tablet by mouth once daily 8/28/23   Berto Raymundo MD   KRILL OIL PO Take by mouth    Historical Provider, MD   Multiple Vitamin (MULTI VITAMIN DAILY PO) Take by mouth    Historical Provider, MD   Probiotic Product (PROBIOTIC-10 PO) Take by mouth    Historical Provider, MD   Turmeric 500 MG CAPS Take by mouth    Historical Provider, MD     ACTIVE MEDICATIONS    ======    I have discussed the patient's history, physical exam findings, assessment, and plan in detail with attending, Dr. Ray Figueroa    Thank you for allowing me to participate in the care of your patient, Mahesh Nguyen.     [address] [facility] Student, MS4    Yasmin Roman, [address] [facility] Neurology, PGY2

## 2023-12-05 NOTE — PLAN OF CARE
Problem: PAIN - ADULT  Goal: Verbalizes/displays adequate comfort level or baseline comfort level  Description: Interventions:  - Encourage patient to monitor pain and request assistance  - Assess pain using appropriate pain scale  - Administer analgesics based on type and severity of pain and evaluate response  - Implement non-pharmacological measures as appropriate and evaluate response  - Consider cultural and social influences on pain and pain management  - Notify physician/advanced practitioner if interventions unsuccessful or patient reports new pain  Outcome: Progressing     Problem: INFECTION - ADULT  Goal: Absence or prevention of progression during hospitalization  Description: INTERVENTIONS:  - Assess and monitor for signs and symptoms of infection  - Monitor lab/diagnostic results  - Monitor all insertion sites, i.e. indwelling lines, tubes, and drains  - Monitor endotracheal if appropriate and nasal secretions for changes in amount and color  - [place] appropriate cooling/warming therapies per order  - Administer medications as ordered  - Instruct and encourage patient and family to use good hand hygiene technique  - Identify and instruct in appropriate isolation precautions for identified infection/condition  Outcome: Progressing     Problem: SAFETY ADULT  Goal: Patient will remain free of falls  Description: INTERVENTIONS:  - Educate patient/family on patient safety including physical limitations  - Instruct patient to call for assistance with activity   - Consult OT/PT to assist with strengthening/mobility   - Keep Call bell within reach  - Keep bed low and locked with side rails adjusted as appropriate  - Keep care items and personal belongings within reach  - Initiate and maintain comfort rounds    Outcome: Progressing     Problem: DISCHARGE PLANNING  Goal: Discharge to home or other facility with appropriate resources  Description: INTERVENTIONS:  - Identify barriers to discharge w/patient and caregiver  - Arrange for needed discharge resources and transportation as appropriate  - Identify discharge learning needs (meds, wound care, etc.)  - Arrange for interpretive services to assist at discharge as needed  - Refer to Case Management Department for coordinating discharge planning if the patient needs post-hospital services based on physician/advanced practitioner order or complex needs related to functional status, cognitive ability, or social support system  Outcome: Progressing

## 2023-12-05 NOTE — ASSESSMENT & PLAN NOTE
- Pt maintained on duloxetine 30mg qd PTA, continue  - Ativan PRN for breakthrough anxiety, pt reports she is very anxious about her MS flare

## 2023-12-05 NOTE — H&P
NEUROLOGY RESIDENCY - ADMISSION H&P NOTE     Name: Shabnam Swift   Age & Sex: 29 y.o. female   MRN: [id number]  Unit/Bed#: ED 13   Encounter: [id number]      Anticipated Length of Stay:  Patient will be admitted on an Inpatient basis with an anticipated length of stay of  2 midnights. Justification for Hospital Stay: MS workup and management    Shabnam Swift will need follow up in 6 weeks with multiple sclerosis attending. She will not require outpatient neurological testing. Pending for discharge: MS workup     ASSESSMENT & PLAN   * Multiple sclerosis [facility])  Assessment & Plan  Assessment:  Shabnam Swift is a 29 y.o. female with a PMHx of MS who presented on 12/5 with 1-week on and off symptoms of vertigo, particularly at night. Initial symptoms were visual symptoms including diplopia. These symptoms were sudden in onset over week and lasted 7 days. Symptoms did fully resolve. Over the past month, she has also developed new symptoms of urinary tract symptoms including urge incontinence. Previous evaluation has included MRI of brain, CSF analysis, and Lyme antibody. MRI brain in 2019 showed multiple foci of T2 hyperintensity identified in the supratentorial white matter compatible with MS disease. Lyme negative at the time. MRI MS brain w/wo contrast, 11/30/23: Multiple periventricular, subcortical, and pericallosal white matter demyelinating lesions consistent with the patient's history of multiple sclerosis, moderate plaque burden. New enhancing lesions within the right frontoparietal, left temporal, right periatrial, and right centrum semiovale white matter as detailed compatible with acute demyelination compared to the prior outside 1/22/2021 examination. The larger lesions demonstrate surrounding vasogenic edema. There are additional new subcentimeter lesions without enhancement including a right pontine focus. Negative urine pregnancy. Neg U/A for infection. TSH WNL.     Prior treatments include: glatiramer acetate (COPAXONE) - did not tolerate 2/2 infection while on it     Impression: Clinical presentation and neuroimaging concerning for MS flare. Plan:  Discussed with attending, Dr. Mateo Parra  Solu-Medrol 1g IV daily for 5 days - Accuchecks and SSI TID AC as needed  Continue Vit D3 supplementation  MRI C, T and L spine pending  Patient will need to follow up with OP multiple sclerosis attending within 4 weeks or sooner if possible. 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)  Assessment & Plan  - Noted on thrombosis panel 9/2022 at [facility] that was obtained due to a family history of clotting disorders  - Not on AC PTA  - Pt has had a reaction to lovenox PTA, will hold off on pharmacological dvt prophylaxis and continue with mechanical prophylaxis and encourage frequent ambulation if safe    PCOS (polycystic ovarian syndrome)  Assessment & Plan  - History of PCOS  - Pt maintained on Jencycla (Norethisterone) 0.35mg daily PTA - This has been Ok'd for use by OB-GYN in the setting of her ATIII deficiency    Recurrent major depressive disorder, in full remission ([address])  Assessment & Plan  - Pt maintained on duloxetine 30mg qd PTA, continue  - Ativan PRN for breakthrough anxiety, pt reports she is very anxious about her MS flare             VTE Prophylaxis: Ambulate as tolerated  / sequential compression device   Code Status: Level 1 - full code  POLST: POLST form is not discussed and not completed at this time. CHIEF COMPLAINT     Chief Complaint   Patient presents with    Abnormal Lab     Pt states she recently had an MRI and was told to come to the ED d/t abnormal MRI findings. Pt c/o vertigo symtpoms        HISTORY OF PRESENT ILLNESS     Miranda Mendez is a 29 y.o. female  with pertinent history of MS who presented for vertigo that began 1 week prior. Longest episode of vertigo lasted 2 days and pt has not had symptoms since Saturday. Pt initially saw her PCP for this and MRI was ordered.  Patient was told to come to ED due to new hyperintense lesions seen on MRI. Patient admits to also feeling very fatigued for the last 3 weeks along with intermittent diplopia. Pt was diagnosed with MS in 2019 when she presented to ED with acute face numbness and MRI showed possible demyelinating signals. Patient saw neurology which trialed her on Copaxone. Pt experienced infection after initiating this medication and stopped taking it. Since then, pt has not been on medications for MS. Since her diagnosis, patient says she has not noticed any symptoms up until now. Most recently, patient complains of unable to control urination. Pt admits to numbness in her fingers as a teenager. Pt currently denies dizziness, nausea, vertigo, headache, diplopia, chest pain, and impaired coordination. REVIEW OF SYSTEMS     Review of Systems   Constitutional:  Positive for fatigue. Negative for chills and fever. HENT:  Negative for facial swelling, hearing loss and sinus pain. Eyes:  Positive for visual disturbance (diplopia). Negative for pain. Respiratory:  Negative for chest tightness and shortness of breath. Cardiovascular:  Negative for chest pain and palpitations. Gastrointestinal:  Negative for abdominal distention and abdominal pain. Neurological:  Negative for dizziness, light-headedness and headaches. All other systems reviewed and are negative. PAST MEDICAL HISTORY     Past Medical History:   Diagnosis Date    Abnormal Pap smear of cervix 2015    Depression     Multiple sclerosis (HCC)     Opioid abuse, in remission ([address])     Polycystic ovary syndrome     Substance abuse ([address])        Allergies: Allergies   Allergen Reactions    Iodine - Food Allergy Hives    Shrimp Flavor - Food Allergy Hives    Lovenox [Enoxaparin] Rash       PAST SURGICAL HISTORY   History reviewed. No pertinent surgical history.     SOCIAL & FAMILY HISTORY     Social History     Substance and Sexual Activity   Alcohol Use Not Currently Social History     Substance and Sexual Activity   Drug Use Not Currently    Types: Heroin, Oxycodone     Social History     Tobacco Use   Smoking Status Every Day    Packs/day: 1.00    Years: 10.00    Total pack years: 10.00    Types: Cigarettes   Smokeless Tobacco Never       Marital Status: Single     Family History:  Family History   Problem Relation Age of Onset    Hypertension Mother     Anxiety disorder Mother     Deep vein thrombosis Father     Pulmonary embolism Father     Cancer Paternal Grandmother            OBJECTIVE     Vitals:    23 1300 23 1400 23 1500 23 1600   BP: 123/71 119/76 123/75 123/79   BP Location: Right arm Right arm Right arm Right arm   Pulse: 78 70 72 76   Resp: 20 20 20 20   Temp:       TempSrc:       SpO2: 98% 97% 97% 98%        Temperature:   Temp (24hrs), Av.2 °F (36.8 °C), Min:98.2 °F (36.8 °C), Max:98.2 °F (36.8 °C)    Temperature: 98.2 °F (36.8 °C)    Intake & Output:  I/O       None            Weights: There is no height or weight on file to calculate BMI. Weight (last 2 days)       None            Physical Exam  Vitals reviewed. Constitutional:       Appearance: Normal appearance. HENT:      Head: Normocephalic and atraumatic. Nose: Nose normal.      Mouth/Throat:      Mouth: Mucous membranes are moist.      Pharynx: Oropharynx is clear. Eyes:      Extraocular Movements: Extraocular movements intact and EOM normal.      Pupils: Pupils are equal, round, and reactive to light. Pulmonary:      Effort: Pulmonary effort is normal.      Breath sounds: Normal breath sounds. Abdominal:      General: Abdomen is flat. Palpations: Abdomen is soft. Skin:     General: Skin is warm. Neurological:      Mental Status: She is alert and oriented to person, place, and time.       Coordination: Finger-Nose-Finger Test normal.      Deep Tendon Reflexes:      Reflex Scores:       Tricep reflexes are 2+ on the right side and 2+ on the left side. Bicep reflexes are 2+ on the right side and 2+ on the left side. Brachioradialis reflexes are 2+ on the right side and 2+ on the left side. Patellar reflexes are 3+ on the right side and 3+ on the left side. Achilles reflexes are 3+ on the right side and 3+ on the left side. Psychiatric:         Mood and Affect: Mood normal.         Speech: Speech normal.          Neurologic Exam     Mental Status   Oriented to person, place, and time. Attention: normal. Concentration: normal.   Speech: speech is normal   Level of consciousness: alert  Knowledge: good. Cranial Nerves     CN II   Visual fields full to confrontation. CN III, IV, VI   Pupils are equal, round, and reactive to light. Extraocular motions are normal.   Right pupil: Consensual response: intact. Left pupil: Consensual response: intact. CN III: no CN III palsy  CN VI: no CN VI palsy  Nystagmus: none   Diplopia: none    CN V   Facial sensation intact. CN VII   Facial expression full, symmetric. CN VIII   CN VIII normal.     CN IX, X   CN IX normal.   CN X normal.     CN XI   CN XI normal.     CN XII   CN XII normal.     Motor Exam   Muscle bulk: normal  Overall muscle tone: normal  Right arm pronator drift: Mild. Strength   Strength 5/5 except as noted. Sensory Exam   Light touch normal.   Pinprick normal.   Decreased sensation to temperature on posterior L forearm and around T8 on R side of back     Gait, Coordination, and Reflexes     Coordination   Finger to nose coordination: normal    Reflexes   Right brachioradialis: 2+  Left brachioradialis: 2+  Right biceps: 2+  Left biceps: 2+  Right triceps: 2+  Left triceps: 2+  Right patellar: 3+  Left patellar: 3+  Right achilles: 3+  Left achilles: 3+  Right plantar: normal  Left plantar: normal  Right Ritchie: absent  Left Ritchie: absent  Nonsustained ankle clonus (L - about 2 beats), no clonus in R ankle          LABORATORY DATA     Labs:  I have personally reviewed pertinent reports. Results from last 7 days   Lab Units 12/05/23  1037   WBC Thousand/uL 5.67   HEMOGLOBIN g/dL 15.6*   HEMATOCRIT % 44.0   PLATELETS Thousands/uL 225   NEUTROS PCT % 55   MONOS PCT % 9   EOS PCT % 3      Results from last 7 days   Lab Units 12/05/23  1037   SODIUM mmol/L 140   POTASSIUM mmol/L 3.7   CHLORIDE mmol/L 109*   CO2 mmol/L 25   BUN mg/dL 12   CREATININE mg/dL 0.77   CALCIUM mg/dL 8.7   ALK PHOS U/L 49   ALT U/L 13   AST U/L 14     Results from last 7 days   Lab Units 12/05/23  1037   MAGNESIUM mg/dL 1.8*     Results from last 7 days   Lab Units 12/05/23  1037   PHOSPHORUS mg/dL 3.0                    Micro:  No results found for: "BLOODCX", "[place]", "WOUNDCULT", "SPUTUMCULTUR"    IMAGING & DIAGNOSTIC TESTING     Radiology Results: I have personally reviewed pertinent reports. and I have personally reviewed pertinent films in PACS    MRI inpatient order    (Results Pending)       Other Diagnostic Testing: I have personally reviewed pertinent reports.    and I have personally reviewed pertinent films in PACS    ACTIVE MEDICATIONS     Current Facility-Administered Medications   Medication Dose Route Frequency    acetaminophen (TYLENOL) tablet 650 mg  650 mg Oral Q6H PRN    cholecalciferol (VITAMIN D3) tablet 400 Units  400 Units Oral Daily    DULoxetine (CYMBALTA) delayed release capsule 30 mg  30 mg Oral Daily    insulin lispro (HumaLOG) 100 units/mL subcutaneous injection 1-5 Units  1-5 Units Subcutaneous TID AC    LORazepam (ATIVAN) tablet 0.5 mg  0.5 mg Oral Q12H PRN    melatonin tablet 3 mg  3 mg Oral HS    methylPREDNISolone sodium succinate (Solu-MEDROL) 1,000 mg in sodium chloride 0.9 % 250 mL IVPB  1,000 mg Intravenous Daily    multivitamin stress formula tablet 1 tablet  1 tablet Oral Daily    nicotine (NICODERM CQ) 21 mg/24 hr TD 24 hr patch 1 patch  1 patch Transdermal Daily    ondansetron (ZOFRAN) injection 4 mg  4 mg Intravenous Q6H PRN    pantoprazole (PROTONIX) EC tablet 40 mg  40 mg Oral Early Morning    polyethylene glycol (MIRALAX) packet 17 g  17 g Oral Daily    saccharomyces boulardii (FLORASTOR) capsule 250 mg  250 mg Oral BID         HOME MEDICATIONS     Prior to Admission medications    Medication Sig Start Date End Date Taking? Authorizing Provider   ALPRAZolam Angel Vivar) 0.5 mg tablet Take 30 minutes prior to MRI may repeat x1 no driving if taking 38/22/19   WINSOME Villegas   cholecalciferol (VITAMIN D3) 400 units tablet Take 400 Units by mouth daily    Historical Provider, MD   DULoxetine (CYMBALTA) 30 mg delayed release capsule Take 1 capsule (30 mg total) by mouth daily 9/28/23 9/27/24  WINSOME Villegas   Jencycla 0.35 MG tablet take 1 tablet by mouth once daily 8/28/23   Edwardo Meals, MD   KRILL OIL PO Take by mouth    Historical Provider, MD   Multiple Vitamin (MULTI VITAMIN DAILY PO) Take by mouth    Historical Provider, MD   Probiotic Product (PROBIOTIC-10 PO) Take by mouth    Historical Provider, MD   Turmeric 500 MG CAPS Take by mouth    Historical Provider, MD       ======    I have discussed the patient's history, physical exam findings, assessment, and plan in detail with attending, Dr. Michael Durham    Thank you for allowing me to participate in the care of your patient, Casey Carlos.     [address] [facility] Student, MS4    Aileen Fishman, [address] [facility] Neurology, PGY2

## 2023-12-05 NOTE — ASSESSMENT & PLAN NOTE
Assessment:  Santino Ruiz is a 29 y.o. female with a PMHx of MS who presented on 12/5 with 1-week on and off symptoms of vertigo, particularly at night. Initial symptoms were visual symptoms including diplopia. These symptoms were sudden in onset over week and lasted 7 days. Symptoms did fully resolve. Over the past month, she has also developed new symptoms of urinary tract symptoms including urge incontinence. Previous evaluation has included MRI of brain, CSF analysis, and Lyme antibody. MRI brain in 2019 showed multiple foci of T2 hyperintensity identified in the supratentorial white matter compatible with MS disease. Lyme negative at the time. MRI MS brain w/wo contrast, 11/30/23: Multiple periventricular, subcortical, and pericallosal white matter demyelinating lesions consistent with the patient's history of multiple sclerosis, moderate plaque burden. New enhancing lesions within the right frontoparietal, left temporal, right periatrial, and right centrum semiovale white matter as detailed compatible with acute demyelination compared to the prior outside 1/22/2021 examination. The larger lesions demonstrate surrounding vasogenic edema. There are additional new subcentimeter lesions without enhancement including a right pontine focus. Negative urine pregnancy. Neg U/A for infection. TSH WNL. Prior treatments include: glatiramer acetate (COPAXONE) - did not tolerate 2/2 infection while on it     Impression: Clinical presentation and neuroimaging concerning for MS flare. Plan:  Discussed with attending, Dr. Kurtz Call  Solu-Medrol 1g IV daily for 5 days - Accuchecks and SSI TID AC as needed  Continue Vit D3 supplementation  MRI C, T and L spine pending  Patient will need to follow up with OP multiple sclerosis attending within 4 weeks or sooner if possible.

## 2023-12-05 NOTE — ASSESSMENT & PLAN NOTE
- History of PCOS  - Pt maintained on Jencycla (Norethisterone) 0.35mg daily PTA - This has been Ok'd for use by OB-GYN in the setting of her ATIII deficiency

## 2023-12-05 NOTE — ED PROVIDER NOTES
History  Chief Complaint   Patient presents with    Abnormal Lab     Pt states she recently had an MRI and was told to come to the ED d/t abnormal MRI findings. Pt c/o vertigo symtpoms     28-year-old female with history of MS presenting due to flare of symptoms. Patient notes that a few weeks ago she had flulike symptoms. After this, she started having intermittent dizziness, vision changes, paresthesias in her right arm, and otherwise felt unwell. Her primary care provider ordered an MRI for outpatient which showed multifocal lesions in the brain so she recommended she come to emergency department for evaluation. She used to follow with a neurologist in the past, but has no desire to continue with them and is interested in establishing care with [facility]. She denies any headache, nausea, vomiting, chest pain, shortness of breath, or other complaints at this time. Prior to Admission Medications   Prescriptions Last Dose Informant Patient Reported? Taking? ALPRAZolam (XANAX) 0.5 mg tablet   No No   Sig: Take 30 minutes prior to MRI may repeat x1 no driving if taking   DULoxetine (CYMBALTA) 30 mg delayed release capsule   No No   Sig: Take 1 capsule (30 mg total) by mouth daily   Jencycla 0.35 MG tablet   No No   Sig: take 1 tablet by mouth once daily   KRILL OIL PO  Self Yes No   Sig: Take by mouth   Multiple Vitamin (MULTI VITAMIN DAILY PO)  Self Yes No   Sig: Take by mouth   Probiotic Product (PROBIOTIC-10 PO)  Self Yes No   Sig: Take by mouth   Turmeric 500 MG CAPS  Self Yes No   Sig: Take by mouth   cholecalciferol (VITAMIN D3) 400 units tablet  Self Yes No   Sig: Take 400 Units by mouth daily      Facility-Administered Medications: None       Past Medical History:   Diagnosis Date    Abnormal Pap smear of cervix 2015    Depression     Multiple sclerosis (HCC)     Opioid abuse, in remission ([address] )     Polycystic ovary syndrome     Substance abuse ([address] )        History reviewed.  No pertinent surgical history. Family History   Problem Relation Age of Onset    Hypertension Mother     Anxiety disorder Mother     Deep vein thrombosis Father     Pulmonary embolism Father     Cancer Paternal Grandmother      I have reviewed and agree with the history as documented. E-Cigarette/Vaping    E-Cigarette Use Current Every Day User      E-Cigarette/Vaping Substances    Nicotine Yes     THC No     CBD No     Flavoring No     Other No     Unknown No      Social History     Tobacco Use    Smoking status: Every Day     Packs/day: 1.00     Years: 10.00     Total pack years: 10.00     Types: Cigarettes    Smokeless tobacco: Never   Vaping Use    Vaping Use: Every day    Substances: Nicotine   Substance Use Topics    Alcohol use: Not Currently    Drug use: Not Currently     Types: Heroin, Oxycodone        Review of Systems   All other systems reviewed and are negative. Physical Exam  ED Triage Vitals   Temperature Pulse Respirations Blood Pressure SpO2   12/05/23 0844 12/05/23 0844 12/05/23 0844 12/05/23 0844 12/05/23 0844   98.2 °F (36.8 °C) 79 20 143/98 98 %      Temp Source Heart Rate Source Patient Position - Orthostatic VS BP Location FiO2 (%)   12/05/23 0844 12/05/23 0844 12/05/23 0844 12/05/23 0844 --   Oral Monitor Sitting Left arm       Pain Score       12/05/23 1730       No Pain             Orthostatic Vital Signs  Vitals:    12/06/23 1555 12/06/23 2214 12/07/23 0558 12/07/23 1501   BP: 114/70 122/80 112/67 122/68   Pulse: 92 82 77 80   Patient Position - Orthostatic VS:           Physical Exam  Vitals and nursing note reviewed. Constitutional:       General: She is not in acute distress. Appearance: She is well-developed. HENT:      Head: Normocephalic and atraumatic. Eyes:      Conjunctiva/sclera: Conjunctivae normal.   Cardiovascular:      Rate and Rhythm: Normal rate and regular rhythm. Heart sounds: No murmur heard.   Pulmonary:      Effort: Pulmonary effort is normal. No respiratory distress. Breath sounds: Normal breath sounds. Abdominal:      Palpations: Abdomen is soft. Tenderness: There is no abdominal tenderness. Musculoskeletal:         General: No swelling. Cervical back: Neck supple. Skin:     General: Skin is warm and dry. Capillary Refill: Capillary refill takes less than 2 seconds. Neurological:      General: No focal deficit present. Mental Status: She is alert. Sensory: No sensory deficit. Motor: No weakness.    Psychiatric:         Mood and Affect: Mood normal.         ED Medications  Medications   multivitamin stress formula tablet 1 tablet (1 tablet Oral Given 12/7/23 1006)   saccharomyces boulardii (FLORASTOR) capsule 250 mg (250 mg Oral Given 12/7/23 1013)   cholecalciferol (VITAMIN D3) tablet 400 Units (400 Units Oral Given 12/7/23 1006)   DULoxetine (CYMBALTA) delayed release capsule 30 mg (30 mg Oral Given 12/7/23 1006)   LORazepam (ATIVAN) tablet 0.5 mg (0.5 mg Oral Given 12/7/23 1006)   nicotine (NICODERM CQ) 21 mg/24 hr TD 24 hr patch 1 patch (1 patch Transdermal Not Given 12/7/23 1007)   ondansetron (ZOFRAN) injection 4 mg (has no administration in time range)   polyethylene glycol (MIRALAX) packet 17 g (17 g Oral Given 12/7/23 1006)   acetaminophen (TYLENOL) tablet 650 mg (650 mg Oral Given 12/7/23 1006)   methylPREDNISolone sodium succinate (Solu-MEDROL) 1,000 mg in sodium chloride 0.9 % 250 mL IVPB (1,000 mg Intravenous New Bag 12/7/23 1013)   pantoprazole (PROTONIX) EC tablet 40 mg (40 mg Oral Given 12/7/23 0559)   insulin lispro (HumaLOG) 100 units/mL subcutaneous injection 1-5 Units ( Subcutaneous Not Given 12/7/23 1225)   melatonin tablet 3 mg (3 mg Oral Given 12/6/23 2133)   norethindrone (MICRONOR) tablet 0.35 mg (0.35 mg Oral Not Given 12/7/23 1009)   bisacodyl (DULCOLAX) rectal suppository 10 mg (10 mg Rectal Given 12/6/23 1952)   magnesium sulfate 2 g/50 mL IVPB (premix) 2 g (0 g Intravenous Stopped 12/5/23 1529) LORazepam (ATIVAN) injection 0.5 mg (0.5 mg Intravenous Given 12/5/23 9383)   Gadobutrol injection (SINGLE-DOSE) SOLN 8 mL (8 mL Intravenous Given 12/6/23 0117)       Diagnostic Studies  Results Reviewed       Procedure Component Value Units Date/Time    Basic metabolic panel [[id number]]  (Abnormal) Collected: 12/06/23 0524    Lab Status: Final result Specimen: Blood from Arm, Right Updated: 12/06/23 7952     Sodium 137 mmol/L      Potassium 4.3 mmol/L      Chloride 107 mmol/L      CO2 26 mmol/L      ANION GAP 4 mmol/L      BUN 13 mg/dL      Creatinine 0.78 mg/dL      Glucose 211 mg/dL      Calcium 9.3 mg/dL      eGFR 100 ml/min/1.73sq m     Narrative:      [place] guidelines for Chronic Kidney Disease (CKD):     Stage 1 with normal or high GFR (GFR > 90 mL/min/1.73 square meters)    Stage 2 Mild CKD (GFR = 60-89 mL/min/1.73 square meters)    Stage 3A Moderate CKD (GFR = 45-59 mL/min/1.73 square meters)    Stage 3B Moderate CKD (GFR = 30-44 mL/min/1.73 square meters)    Stage 4 Severe CKD (GFR = 15-29 mL/min/1.73 square meters)    Stage 5 End Stage CKD (GFR <15 mL/min/1.73 square meters)  Note: GFR calculation is accurate only with a steady state creatinine    CBC (With Platelets) [[id number]]  (Abnormal) Collected: 12/06/23 0524    Lab Status: Final result Specimen: Blood from Arm, Right Updated: 12/06/23 0605     WBC 18.48 Thousand/uL      RBC 4.75 Million/uL      Hemoglobin 15.4 g/dL      Hematocrit 45.3 %      MCV 95 fL      MCH 32.4 pg      MCHC 34.0 g/dL      RDW 12.0 %      Platelets 872 Thousands/uL      MPV 9.9 fL     Fingerstick Glucose (POCT) [[id number]]  (Normal) Collected: 12/05/23 1509    Lab Status: Final result Updated: 12/05/23 1511     POC Glucose 113 mg/dl     Urine Microscopic [[id number]]  (Abnormal) Collected: 12/05/23 1243    Lab Status: Final result Specimen: Urine, Clean Catch Updated: 12/05/23 1343     RBC, UA 4-10 /hpf      WBC, UA 4-10 /hpf      Epithelial Cells Occasional /hpf      Bacteria, UA Occasional /hpf      Amorphous Crystals, UA Occasional    UA w Reflex to Microscopic w Reflex to Culture [[id number]]  (Abnormal) Collected: 12/05/23 1243    Lab Status: Final result Specimen: Urine, Clean Catch Updated: 12/05/23 1334     Color, UA Light Yellow     Clarity, UA Clear     Specific Gravity, UA 1.007     pH, UA 6.5     Leukocytes, UA Negative     Nitrite, UA Negative     Protein, UA Negative mg/dl      Glucose, UA Negative mg/dl      Ketones, UA Negative mg/dl      Urobilinogen, UA <2.0 mg/dl      Bilirubin, UA Negative     Occult Blood, UA Large    POCT pregnancy, urine [[id number]]  (Normal) Resulted: 12/05/23 1307    Lab Status: Final result Updated: 12/05/23 1307     EXT Preg Test, Ur Negative     Control Valid    Fingerstick Glucose (POCT) [[id number]]  (Normal) Collected: 12/05/23 1139    Lab Status: Final result Updated: 12/05/23 1140     POC Glucose 75 mg/dl     TSH, 3rd generation with Free T4 reflex [[id number]]  (Normal) Collected: 12/05/23 1037    Lab Status: Final result Specimen: Blood from Arm, Left Updated: 12/05/23 1122     TSH 3RD GENERATON 1.866 uIU/mL     Comprehensive metabolic panel [[id number]]  (Abnormal) Collected: 12/05/23 1037    Lab Status: Final result Specimen: Blood from Arm, Left Updated: 12/05/23 1113     Sodium 140 mmol/L      Potassium 3.7 mmol/L      Chloride 109 mmol/L      CO2 25 mmol/L      ANION GAP 6 mmol/L      BUN 12 mg/dL      Creatinine 0.77 mg/dL      Glucose 90 mg/dL      Calcium 8.7 mg/dL      AST 14 U/L      ALT 13 U/L      Alkaline Phosphatase 49 U/L      Total Protein 6.6 g/dL      Albumin 4.1 g/dL      Total Bilirubin 0.40 mg/dL      eGFR 102 ml/min/1.73sq m     Narrative:      [place] guidelines for Chronic Kidney Disease (CKD):     Stage 1 with normal or high GFR (GFR > 90 mL/min/1.73 square meters)    Stage 2 Mild CKD (GFR = 60-89 mL/min/1.73 square meters)    Stage 3A Moderate CKD (GFR = 45-59 mL/min/1.73 square meters)    Stage 3B Moderate CKD (GFR = 30-44 mL/min/1.73 square meters)    Stage 4 Severe CKD (GFR = 15-29 mL/min/1.73 square meters)    Stage 5 End Stage CKD (GFR <15 mL/min/1.73 square meters)  Note: GFR calculation is accurate only with a steady state creatinine    Magnesium [[id number]]  (Abnormal) Collected: 12/05/23 1037    Lab Status: Final result Specimen: Blood from Arm, Left Updated: 12/05/23 1113     Magnesium 1.8 mg/dL     Phosphorus [[id number]]  (Normal) Collected: 12/05/23 1037    Lab Status: Final result Specimen: Blood from Arm, Left Updated: 12/05/23 1113     Phosphorus 3.0 mg/dL     CBC and differential [[id number]]  (Abnormal) Collected: 12/05/23 1037    Lab Status: Final result Specimen: Blood from Arm, Left Updated: 12/05/23 1046     WBC 5.67 Thousand/uL      RBC 4.67 Million/uL      Hemoglobin 15.6 g/dL      Hematocrit 44.0 %      MCV 94 fL      MCH 33.4 pg      MCHC 35.5 g/dL      RDW 12.2 %      MPV 9.9 fL      Platelets 376 Thousands/uL      nRBC 0 /100 WBCs      Neutrophils Relative 55 %      Immat GRANS % 0 %      Lymphocytes Relative 32 %      Monocytes Relative 9 %      Eosinophils Relative 3 %      Basophils Relative 1 %      Neutrophils Absolute 3.13 Thousands/µL      Immature Grans Absolute 0.02 Thousand/uL      Lymphocytes Absolute 1.80 Thousands/µL      Monocytes Absolute 0.52 Thousand/µL      Eosinophils Absolute 0.15 Thousand/µL      Basophils Absolute 0.05 Thousands/µL                    MRI cervical spine w wo contrast   Final Result by Angelina Dhaliwal MD (12/06 1613)      No evidence of cervical spinal cord demyelination. No stenosis. Workstation performed: [id number]         MRI thoracic spine w wo contrast   Final Result by Angelina Dhaliwal MD (12/06 0641)      No evidence of thoracic cord demyelination. Single small T9-T10 disc herniation causing minimal central canal narrowing.          Workstation performed: [id number]         MRI lumbar spine w wo contrast   Final Result by Stacy Kaplan MD (12/06 7316)      No evidence of conus demyelination. Degenerative disc disease from L3-L4 through L5-S1 with small disc herniations and minimal to mild stenosis. Details above. Workstation performed: [id number]               Procedures  Procedures      ED Course                                       Medical Decision Making  30-year-old female presenting to emergency department due to MS flare. Reviewed MRI from outpatient records showing multifocal lesions. Consulted neurology for further management prior to providing any medications or obtaining labs as there are no other concerning findings on exam or history requiring workup at this time. Neurology to admit patient for further management. Patient agreeable to plan. Risk  Decision regarding hospitalization. Disposition  Final diagnoses:   Multiple sclerosis ([address])     Time reflects when diagnosis was documented in both MDM as applicable and the Disposition within this note       Time User Action Codes Description Comment    12/5/2023  9:13 AM Aaron Hendrickson Add [G35] Multiple sclerosis [facility])           ED Disposition       ED Disposition   Admit    Condition   Stable    Date/Time   Tue Dec 5, 2023  9:50 AM    Comment   Case was discussed with nEUROLOGY and the patient's admission status was agreed to be Admission Status: inpatient status to the service of Dr. Thacker Daily .                Follow-up Information    None         Current Discharge Medication List        CONTINUE these medications which have NOT CHANGED    Details   ALPRAZolam (XANAX) 0.5 mg tablet Take 30 minutes prior to MRI may repeat x1 no driving if taking  Qty: 2 tablet, Refills: 0    Associated Diagnoses: Anxiety      cholecalciferol (VITAMIN D3) 400 units tablet Take 400 Units by mouth daily      DULoxetine (CYMBALTA) 30 mg delayed release capsule Take 1 capsule (30 mg total) by mouth daily  Qty: 90 capsule, Refills: 3 Associated Diagnoses: Recurrent major depressive disorder, in full remission (HCC)      Jencycla 0.35 MG tablet take 1 tablet by mouth once daily  Qty: 84 tablet, Refills: 1    Associated Diagnoses: Oral contraceptive pill surveillance      KRILL OIL PO Take by mouth      Multiple Vitamin (MULTI VITAMIN DAILY PO) Take by mouth      Probiotic Product (PROBIOTIC-10 PO) Take by mouth      Turmeric 500 MG CAPS Take by mouth           No discharge procedures on file. PDMP Review       None             ED Provider  Attending physically available and evaluated [facility]. I managed the patient along with the ED Attending.     Electronically Signed by           Lawanda Nathan MD  12/07/23 2100

## 2023-12-06 PROBLEM — D72.829 LEUKOCYTOSIS: Status: ACTIVE | Noted: 2023-12-06

## 2023-12-06 LAB
ANION GAP SERPL CALCULATED.3IONS-SCNC: 4 MMOL/L
BUN SERPL-MCNC: 13 MG/DL (ref 5–25)
CALCIUM SERPL-MCNC: 9.3 MG/DL (ref 8.4–10.2)
CHLORIDE SERPL-SCNC: 107 MMOL/L (ref 96–108)
CO2 SERPL-SCNC: 26 MMOL/L (ref 21–32)
CREAT SERPL-MCNC: 0.78 MG/DL (ref 0.6–1.3)
ERYTHROCYTE [DISTWIDTH] IN BLOOD BY AUTOMATED COUNT: 12 % (ref 11.6–15.1)
GFR SERPL CREATININE-BSD FRML MDRD: 100 ML/MIN/1.73SQ M
GLUCOSE SERPL-MCNC: 114 MG/DL (ref 65–140)
GLUCOSE SERPL-MCNC: 119 MG/DL (ref 65–140)
GLUCOSE SERPL-MCNC: 191 MG/DL (ref 65–140)
GLUCOSE SERPL-MCNC: 195 MG/DL (ref 65–140)
GLUCOSE SERPL-MCNC: 211 MG/DL (ref 65–140)
HCT VFR BLD AUTO: 45.3 % (ref 34.8–46.1)
HGB BLD-MCNC: 15.4 G/DL (ref 11.5–15.4)
MAGNESIUM SERPL-MCNC: 2.2 MG/DL (ref 1.9–2.7)
MCH RBC QN AUTO: 32.4 PG (ref 26.8–34.3)
MCHC RBC AUTO-ENTMCNC: 34 G/DL (ref 31.4–37.4)
MCV RBC AUTO: 95 FL (ref 82–98)
PLATELET # BLD AUTO: 239 THOUSANDS/UL (ref 149–390)
PMV BLD AUTO: 9.9 FL (ref 8.9–12.7)
POTASSIUM SERPL-SCNC: 4.3 MMOL/L (ref 3.5–5.3)
RBC # BLD AUTO: 4.75 MILLION/UL (ref 3.81–5.12)
SODIUM SERPL-SCNC: 137 MMOL/L (ref 135–147)
WBC # BLD AUTO: 18.48 THOUSAND/UL (ref 4.31–10.16)

## 2023-12-06 PROCEDURE — 99233 SBSQ HOSP IP/OBS HIGH 50: CPT | Performed by: PSYCHIATRY & NEUROLOGY

## 2023-12-06 PROCEDURE — A9585 GADOBUTROL INJECTION: HCPCS | Performed by: PSYCHIATRY & NEUROLOGY

## 2023-12-06 PROCEDURE — 82948 REAGENT STRIP/BLOOD GLUCOSE: CPT

## 2023-12-06 PROCEDURE — 80048 BASIC METABOLIC PNL TOTAL CA: CPT | Performed by: PSYCHIATRY & NEUROLOGY

## 2023-12-06 PROCEDURE — 85027 COMPLETE CBC AUTOMATED: CPT | Performed by: PSYCHIATRY & NEUROLOGY

## 2023-12-06 PROCEDURE — 83735 ASSAY OF MAGNESIUM: CPT | Performed by: PSYCHIATRY & NEUROLOGY

## 2023-12-06 RX ORDER — GADOBUTROL 604.72 MG/ML
8 INJECTION INTRAVENOUS
Status: COMPLETED | OUTPATIENT
Start: 2023-12-06 | End: 2023-12-06

## 2023-12-06 RX ORDER — BISACODYL 10 MG
10 SUPPOSITORY, RECTAL RECTAL DAILY PRN
Status: DISCONTINUED | OUTPATIENT
Start: 2023-12-06 | End: 2023-12-10 | Stop reason: HOSPADM

## 2023-12-06 RX ORDER — ACETAMINOPHEN AND CODEINE PHOSPHATE 120; 12 MG/5ML; MG/5ML
1 SOLUTION ORAL DAILY
Status: DISCONTINUED | OUTPATIENT
Start: 2023-12-06 | End: 2023-12-08

## 2023-12-06 RX ADMIN — ACETAMINOPHEN 650 MG: 325 TABLET, FILM COATED ORAL at 05:44

## 2023-12-06 RX ADMIN — GADOBUTROL 8 ML: 604.72 INJECTION INTRAVENOUS at 01:17

## 2023-12-06 RX ADMIN — Medication 250 MG: at 08:07

## 2023-12-06 RX ADMIN — MELATONIN TAB 3 MG 3 MG: 3 TAB at 21:33

## 2023-12-06 RX ADMIN — ACETAMINOPHEN 650 MG: 325 TABLET, FILM COATED ORAL at 21:35

## 2023-12-06 RX ADMIN — BISACODYL 10 MG: 10 SUPPOSITORY RECTAL at 19:52

## 2023-12-06 RX ADMIN — B-COMPLEX W/ C & FOLIC ACID TAB 1 TABLET: TAB at 08:07

## 2023-12-06 RX ADMIN — DULOXETINE HYDROCHLORIDE 30 MG: 30 CAPSULE, DELAYED RELEASE ORAL at 08:07

## 2023-12-06 RX ADMIN — LORAZEPAM 0.5 MG: 0.5 TABLET ORAL at 08:28

## 2023-12-06 RX ADMIN — LORAZEPAM 0.5 MG: 0.5 TABLET ORAL at 21:33

## 2023-12-06 RX ADMIN — INSULIN LISPRO 1 UNITS: 100 INJECTION, SOLUTION INTRAVENOUS; SUBCUTANEOUS at 17:56

## 2023-12-06 RX ADMIN — SODIUM CHLORIDE 1000 MG: 0.9 INJECTION, SOLUTION INTRAVENOUS at 08:07

## 2023-12-06 RX ADMIN — POLYETHYLENE GLYCOL 3350 17 G: 17 POWDER, FOR SOLUTION ORAL at 08:07

## 2023-12-06 RX ADMIN — CHOLECALCIFEROL TAB 10 MCG (400 UNIT) 400 UNITS: 10 TAB at 08:07

## 2023-12-06 RX ADMIN — Medication 250 MG: at 17:55

## 2023-12-06 NOTE — PLAN OF CARE
Problem: PAIN - ADULT  Goal: Verbalizes/displays adequate comfort level or baseline comfort level  Description: Interventions:  - Encourage patient to monitor pain and request assistance  - Assess pain using appropriate pain scale  - Administer analgesics based on type and severity of pain and evaluate response  - Implement non-pharmacological measures as appropriate and evaluate response  - Consider cultural and social influences on pain and pain management  - Notify physician/advanced practitioner if interventions unsuccessful or patient reports new pain  Outcome: Progressing     Problem: INFECTION - ADULT  Goal: Absence or prevention of progression during hospitalization  Description: INTERVENTIONS:  - Assess and monitor for signs and symptoms of infection  - Monitor lab/diagnostic results  - Monitor all insertion sites, i.e. indwelling lines, tubes, and drains  - Monitor endotracheal if appropriate and nasal secretions for changes in amount and color  - [place] appropriate cooling/warming therapies per order  - Administer medications as ordered  - Instruct and encourage patient and family to use good hand hygiene technique  - Identify and instruct in appropriate isolation precautions for identified infection/condition  Outcome: Progressing     Problem: SAFETY ADULT  Goal: Patient will remain free of falls  Description: INTERVENTIONS:  - Educate patient/family on patient safety including physical limitations  - Instruct patient to call for assistance with activity   - Consult OT/PT to assist with strengthening/mobility   - Keep Call bell within reach  - Keep bed low and locked with side rails adjusted as appropriate  - Keep care items and personal belongings within reach  - Initiate and maintain comfort rounds    Outcome: Progressing     Problem: DISCHARGE PLANNING  Goal: Discharge to home or other facility with appropriate resources  Description: INTERVENTIONS:  - Identify barriers to discharge w/patient and caregiver  - Arrange for needed discharge resources and transportation as appropriate  - Identify discharge learning needs (meds, wound care, etc.)  - Arrange for interpretive services to assist at discharge as needed  - Refer to Case Management Department for coordinating discharge planning if the patient needs post-hospital services based on physician/advanced practitioner order or complex needs related to functional status, cognitive ability, or social support system  Outcome: Progressing

## 2023-12-06 NOTE — ASSESSMENT & PLAN NOTE
Assessment:  Jm Pollock is a 29 y.o. female with a PMHx of MS who presented on 12/5 with 1-week on and off symptoms of vertigo, particularly at night. Initial symptoms were visual symptoms including diplopia. These symptoms were sudden in onset over week and lasted 7 days. Symptoms did fully resolve. Over the past month, she has also developed new symptoms of urinary tract symptoms including urge incontinence. Previous evaluation has included MRI of brain, CSF analysis, and Lyme antibody. MRI brain in 2019 showed multiple foci of T2 hyperintensity identified in the supratentorial white matter compatible with MS disease. Lyme negative at the time. MRI MS brain w/wo contrast, 11/30/23: Multiple periventricular, subcortical, and pericallosal white matter demyelinating lesions consistent with the patient's history of multiple sclerosis, moderate plaque burden. New enhancing lesions within the right frontoparietal, left temporal, right periatrial, and right centrum semiovale white matter as detailed compatible with acute demyelination compared to the prior outside 1/22/2021 examination. The larger lesions demonstrate surrounding vasogenic edema. There are additional new subcentimeter lesions without enhancement including a right pontine focus. Negative urine pregnancy. Neg U/A for infection. TSH WNL. MRI C, T, and L spine 12/6/23: Shows no evidence of spinal cord demyelination. Single small T9-T10 disc herniation identified causing minimal central canal narrowing. Degenerative disc disease from L3-L4 through L5-S1 with small disc herniations and minimal to mild stenosis. Prior treatments include: glatiramer acetate (COPAXONE) - did not tolerate 2/2 infection while on it     Impression: Clinical presentation and neuroimaging concerning for MS flare.     Plan:  Discussed with attending, Dr. Nena Camacho  Solu-Medrol 1g IV daily for 5 days - Day 2/5  Accuchecks and SSI TID AC as needed  Continue Vit D3 supplementation  Patient will need to follow up with OP multiple sclerosis attending within 4 weeks or sooner if possible.

## 2023-12-06 NOTE — PROGRESS NOTES
NEUROLOGY RESIDENCY PROGRESS NOTE     Name: Leila Jack   Age & Sex: 29 y.o. female   MRN: [id number]  Unit/Bed#: SUXH 309-28   Encounter: [id number]    Leila Jack will need follow up in in 6 weeks with multiple sclerosis attending . She will not require outpatient neurological testing. Pending for discharge: IV Solumedrol    ASSESSMENT & PLAN     * Multiple sclerosis [facility])  Assessment & Plan  Assessment:  Leila Jack is a 29 y.o. female with a PMHx of MS who presented on 12/5 with 1-week on and off symptoms of vertigo, particularly at night. Initial symptoms were visual symptoms including diplopia. These symptoms were sudden in onset over week and lasted 7 days. Symptoms did fully resolve. Over the past month, she has also developed new symptoms of urinary tract symptoms including urge incontinence. Previous evaluation has included MRI of brain, CSF analysis, and Lyme antibody. MRI brain in 2019 showed multiple foci of T2 hyperintensity identified in the supratentorial white matter compatible with MS disease. Lyme negative at the time. MRI MS brain w/wo contrast, 11/30/23: Multiple periventricular, subcortical, and pericallosal white matter demyelinating lesions consistent with the patient's history of multiple sclerosis, moderate plaque burden. New enhancing lesions within the right frontoparietal, left temporal, right periatrial, and right centrum semiovale white matter as detailed compatible with acute demyelination compared to the prior outside 1/22/2021 examination. The larger lesions demonstrate surrounding vasogenic edema. There are additional new subcentimeter lesions without enhancement including a right pontine focus. Negative urine pregnancy. Neg U/A for infection. TSH WNL. MRI C, T, and L spine 12/6/23: Shows no evidence of spinal cord demyelination. Single small T9-T10 disc herniation identified causing minimal central canal narrowing.  Degenerative disc disease from L3-L4 through L5-S1 with small disc herniations and minimal to mild stenosis. Prior treatments include: glatiramer acetate (COPAXONE) - did not tolerate 2/2 infection while on it     Impression: Clinical presentation and neuroimaging concerning for MS flare. Plan:  Discussed with attending, Dr. Sarah Escobar  Solu-Medrol 1g IV daily for 5 days - Day 2/5  Accuchecks and SSI TID AC as needed  Continue Vit D3 supplementation  Patient will need to follow up with OP multiple sclerosis attending within 4 weeks or sooner if possible. Leukocytosis  Assessment & Plan  WBCs elevated most likely in the setting of IV solumedrol. 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[facility])  Assessment & Plan  - Noted on thrombosis panel 9/2022 at [facility] that was obtained due to a family history of clotting disorders  - Not on AC PTA  - Pt has had a reaction to lovenox PTA, will hold off on pharmacological dvt prophylaxis and continue with mechanical prophylaxis and encourage frequent ambulation if safe    PCOS (polycystic ovarian syndrome)  Assessment & Plan  - History of PCOS  - Pt maintained on Jencycla (Norethisterone) 0.35mg daily PTA - This has been Ok'd for use by OB-GYN in the setting of her ATIII deficiency    Recurrent major depressive disorder, in full remission ([address])  Assessment & Plan  - Pt maintained on duloxetine 30mg qd PTA, continue  - Ativan PRN for breakthrough anxiety, pt reports she is very anxious about her MS flare        SUBJECTIVE     Patient was seen and examined today. No acute events overnight. Patient says she has a mild headache likely due to fatigue. Patient did not get good sleep overnight. Patient says she is prone to getting headaches when fatigued. Headache similar in quality to her previous. Patient has no complaints otherwise. She currently denies dizziness, nausea, vertigo, diplopia, chest pain, and impaired coordination. Review of Systems   Constitutional:  Positive for fatigue.    HENT:  Negative for ear pain, sinus pain and tinnitus. Eyes:  Negative for pain and visual disturbance. Respiratory:  Negative for cough and chest tightness. Cardiovascular:  Negative for chest pain. Gastrointestinal:  Negative for abdominal pain. Genitourinary:  Negative for difficulty urinating. Neurological:  Positive for headaches. Negative for dizziness. All other systems reviewed and are negative. OBJECTIVE     Patient ID: Charanjit Maciel is a 29 y.o. female. Vitals:    23 2000 23 2059 23 0541 23 0746   BP:  129/83 112/75 135/73   BP Location:       Pulse:  72 76 74   Resp:  18 18    Temp:  97.8 °F (36.6 °C) 97.9 °F (36.6 °C) 98.2 °F (36.8 °C)   TempSrc:       SpO2: 96% 97% 97% 97%   Weight:       Height:          Temperature:   Temp (24hrs), Av.3 °F (36.8 °C), Min:97.8 °F (36.6 °C), Max:99.4 °F (37.4 °C)    Temperature: 98.2 °F (36.8 °C)      Physical Exam  Vitals and nursing note reviewed. Constitutional:       Appearance: Normal appearance. HENT:      Head: Normocephalic and atraumatic. Nose: Nose normal.      Mouth/Throat:      Mouth: Mucous membranes are moist.      Pharynx: Oropharynx is clear. Eyes:      Extraocular Movements: Extraocular movements intact and EOM normal.      Pupils: Pupils are equal, round, and reactive to light. Cardiovascular:      Rate and Rhythm: Normal rate and regular rhythm. Pulmonary:      Effort: Pulmonary effort is normal.      Breath sounds: Normal breath sounds. Abdominal:      General: Abdomen is flat. Bowel sounds are normal.      Palpations: Abdomen is soft. Skin:     General: Skin is warm. Neurological:      Mental Status: She is alert and oriented to person, place, and time. Cranial Nerves: Cranial nerves 2-12 are intact.       Motor: Motor strength is normal.     Coordination: Finger-Nose-Finger Test and Heel to Shin Test normal.      Deep Tendon Reflexes:      Reflex Scores:       Tricep reflexes are 2+ on the right side and 2+ on the left side. Bicep reflexes are 2+ on the right side and 2+ on the left side. Brachioradialis reflexes are 2+ on the right side and 2+ on the left side. Patellar reflexes are 3+ on the right side and 3+ on the left side. Achilles reflexes are 3+ on the right side and 3+ on the left side. Psychiatric:         Speech: Speech normal.          Neurologic Exam     Mental Status   Oriented to person, place, and time. Attention: normal. Concentration: normal.   Speech: speech is normal   Level of consciousness: alert  Knowledge: good. Cranial Nerves   Cranial nerves II through XII intact. CN II   Visual fields full to confrontation. CN III, IV, VI   Pupils are equal, round, and reactive to light. Extraocular motions are normal.   Right pupil: Consensual response: intact. Left pupil: Consensual response: intact. Nystagmus: none     CN V   Facial sensation intact. CN VII   Facial expression full, symmetric. CN VIII   CN VIII normal.     CN IX, X   CN IX normal.   CN X normal.     CN XI   CN XI normal.     CN XII   CN XII normal.     Motor Exam   Muscle bulk: normal  Overall muscle tone: normal    Strength   Strength 5/5 throughout. Sensory Exam   Light touch normal.   Right arm pinprick: normal  Left arm pinprick: decreased from elbow (Decreased on posterior L forearm)  Right leg pinprick: normal  Left leg pinprick: normal    Gait, Coordination, and Reflexes     Coordination   Finger to nose coordination: normal  Heel to shin coordination: normal    Reflexes   Right brachioradialis: 2+  Left brachioradialis: 2+  Right biceps: 2+  Left biceps: 2+  Right triceps: 2+  Left triceps: 2+  Right patellar: 3+  Left patellar: 3+  Right achilles: 3+  Left achilles: 3+  Right plantar: normal  Left plantar: normal  Right ankle clonus: present (R foot nonsustained ankle clonus)  Left ankle clonus: absent          LABORATORY DATA     Labs:  I have personally reviewed pertinent reports. Results from last 7 days   Lab Units 12/06/23  0524 12/05/23  1037   WBC Thousand/uL 18.48* 5.67   HEMOGLOBIN g/dL 15.4 15.6*   HEMATOCRIT % 45.3 44.0   PLATELETS Thousands/uL 239 225   NEUTROS PCT %  --  55   MONOS PCT %  --  9   EOS PCT %  --  3      Results from last 7 days   Lab Units 12/06/23  0524 12/05/23  1037   SODIUM mmol/L 137 140   POTASSIUM mmol/L 4.3 3.7   CHLORIDE mmol/L 107 109*   CO2 mmol/L 26 25   BUN mg/dL 13 12   CREATININE mg/dL 0.78 0.77   CALCIUM mg/dL 9.3 8.7   ALK PHOS U/L  --  49   ALT U/L  --  13   AST U/L  --  14     Results from last 7 days   Lab Units 12/06/23  0524 12/05/23  1037   MAGNESIUM mg/dL 2.2 1.8*     Results from last 7 days   Lab Units 12/05/23  1037   PHOSPHORUS mg/dL 3.0                    IMAGING & DIAGNOSTIC TESTING     Radiology Results: I have personally reviewed pertinent reports. and I have personally reviewed pertinent films in PACS    MRI cervical spine w wo contrast   Final Result by Lender Buerger, MD (12/06 8263)      No evidence of cervical spinal cord demyelination. No stenosis. Workstation performed: [id number]         MRI thoracic spine w wo contrast   Final Result by Lender Buerger, MD (12/06 4394)      No evidence of thoracic cord demyelination. Single small T9-T10 disc herniation causing minimal central canal narrowing. Workstation performed: [id number]         MRI lumbar spine w wo contrast   Final Result by Lender Buerger, MD (34/26 9552)      No evidence of conus demyelination. Degenerative disc disease from L3-L4 through L5-S1 with small disc herniations and minimal to mild stenosis. Details above. Workstation performed: [id number]             Other Diagnostic Testing: I have personally reviewed pertinent reports.       ACTIVE MEDICATIONS     Current Facility-Administered Medications   Medication Dose Route Frequency    acetaminophen (TYLENOL) tablet 650 mg  650 mg Oral Q6H PRN cholecalciferol (VITAMIN D3) tablet 400 Units  400 Units Oral Daily    DULoxetine (CYMBALTA) delayed release capsule 30 mg  30 mg Oral Daily    insulin lispro (HumaLOG) 100 units/mL subcutaneous injection 1-5 Units  1-5 Units Subcutaneous TID AC    LORazepam (ATIVAN) tablet 0.5 mg  0.5 mg Oral Q12H PRN    melatonin tablet 3 mg  3 mg Oral HS    methylPREDNISolone sodium succinate (Solu-MEDROL) 1,000 mg in sodium chloride 0.9 % 250 mL IVPB  1,000 mg Intravenous Daily    multivitamin stress formula tablet 1 tablet  1 tablet Oral Daily    nicotine (NICODERM CQ) 21 mg/24 hr TD 24 hr patch 1 patch  1 patch Transdermal Daily    norethindrone (MICRONOR) tablet 0.35 mg  1 tablet Oral Daily    ondansetron (ZOFRAN) injection 4 mg  4 mg Intravenous Q6H PRN    pantoprazole (PROTONIX) EC tablet 40 mg  40 mg Oral Early Morning    polyethylene glycol (MIRALAX) packet 17 g  17 g Oral Daily    saccharomyces boulardii (FLORASTOR) capsule 250 mg  250 mg Oral BID       Prior to Admission medications    Medication Sig Start Date End Date Taking?  Authorizing Provider   ALPRAZolam Coral Pencil) 0.5 mg tablet Take 30 minutes prior to MRI may repeat x1 no driving if taking 64/51/41   WINSOME Whaley   cholecalciferol (VITAMIN D3) 400 units tablet Take 400 Units by mouth daily    Historical Provider, MD   DULoxetine (CYMBALTA) 30 mg delayed release capsule Take 1 capsule (30 mg total) by mouth daily 9/28/23 9/27/24  WINSOME Whaley   Jencycla 0.35 MG tablet take 1 tablet by mouth once daily 8/28/23   Elver Ochoa MD   KRILL OIL PO Take by mouth    Historical Provider, MD   Multiple Vitamin (MULTI VITAMIN DAILY PO) Take by mouth    Historical Provider, MD   Probiotic Product (PROBIOTIC-10 PO) Take by mouth    Historical Provider, MD   Turmeric 500 MG CAPS Take by mouth    Historical Provider, MD         VTE Pharmacologic Prophylaxis: Pharmacologic VTE Prophylaxis contraindicated due to reaction to Lovenox  VTE Mechanical Prophylaxis: Frequent ambulation    ======    I have discussed the patient's history, physical exam findings, assessment, and plan in detail with attending, Dr. Cris Hong. Thank you for allowing me to participate in the care of your patient, Ilda Mcneill.     [address] [facility] Neurology Residency, PGY2    [address] [place] [address] Student, MS4

## 2023-12-06 NOTE — UTILIZATION REVIEW
Initial Clinical Review    Admission: Date/Time/Statement:   Admission Orders (From admission, onward)       Ordered        12/05/23 0951  INPATIENT ADMISSION  Once                          Orders Placed This Encounter   Procedures    INPATIENT ADMISSION     Standing Status:   Standing     Number of Occurrences:   1     Order Specific Question:   Level of Care     Answer:   Med Surg [16]     Order Specific Question:   Estimated length of stay     Answer:   More than 2 Midnights     Order Specific Question:   Certification     Answer:   I certify that inpatient services are medically necessary for this patient for a duration of greater than two midnights. See H&P and MD Progress Notes for additional information about the patient's course of treatment. ED Arrival Information       Expected   12/5/2023     Arrival   12/5/2023 08:42    Acuity   Urgent              Means of arrival   Walk-In    Escorted by   Self    Service   Neurology    Admission type   Emergency              Arrival complaint   Multiple sclerosis [facility])             Chief Complaint   Patient presents with    Abnormal Lab     Pt states she recently had an MRI and was told to come to the ED d/t abnormal MRI findings. Pt c/o vertigo symtpoms       Initial Presentation: 29 y.o. female who presented self from home to [address] [place] [address] as a direct admission. Inpatient admission for evaluation and treatment of multiple sclerosis. PMHx: multiple sclerosis, substance use, polycystic ovary syndrome, psychiatric disorder. Presented w/ one week of intermittent vertigo, diplopia. Also noting urge incontinence. Outpatient MRI showed new hyperintense lesions. Also notes fatigue. Has not been taking medications for MS. On exam, decreased sensation to temperature on posterior L forearm and around T8 on R side of back, nonsustained ankle clonus on L (2 beats), no clonus in R ankle.  Plan: IV solu-medrol 1g for 5 days, Vit D supplement, check MRI cervical, thoracic, and lumbar spine; continue noresthisterone and duloxetine. Date: 12/06/23   Day 2: Reports mild headache. Exam unremarkable. WBC 18.48. Plan: IV solumedrol 1 g, BG checks ACHS w/ SSI, continue current meds, supportive care, Trend labs, replete electrolytes as needed. ED Triage Vitals   Temperature Pulse Respirations Blood Pressure SpO2   12/05/23 0844 12/05/23 0844 12/05/23 0844 12/05/23 0844 12/05/23 0844   98.2 °F (36.8 °C) 79 20 143/98 98 %      Temp Source Heart Rate Source Patient Position - Orthostatic VS BP Location FiO2 (%)   12/05/23 0844 12/05/23 0844 12/05/23 0844 12/05/23 0844 --   Oral Monitor Sitting Left arm       Pain Score       12/05/23 1730       No Pain          Wt Readings from Last 1 Encounters:   12/05/23 81.6 kg (180 lb)     Additional Vital Signs:   Date/Time Temp Pulse Resp BP MAP (mmHg) SpO2 O2 Device   12/06/23 07:46:35 98.2 °F (36.8 °C) 74 -- 135/73 94 97 % --   12/06/23 05:41:49 97.9 °F (36.6 °C) 76 18 112/75 87 97 % --   12/05/23 20:59:31 97.8 °F (36.6 °C) 72 18 129/83 98 97 % --   12/05/23 2000 -- -- -- -- -- 96 % None (Room air)   12/05/23 1700 -- 78 16 133/85 101 95 % None (Room air)   12/05/23 16:52:51 99.4 °F (37.4 °C) 82 18 -- -- 96 % --   12/05/23 1600 -- 76 20 123/79 96 98 % None (Room air)   12/05/23 1500 -- 72 20 123/75 95 97 % None (Room air)   12/05/23 1400 -- 70 20 119/76 91 97 % None (Room air)   12/05/23 1300 -- 78 20 123/71 88 98 % None (Room air)   12/05/23 1100 -- 68 20 121/72 90 98 % None (Room air)   12/05/23 1000 -- 78 20 136/86 105 98 % None (Room air)   12/05/23 0915 -- 82 20 131/82 99 99 % None (Room air)     Pertinent Labs/Diagnostic Test Results:   MRI cervical spine w wo contrast   Final Result by Julianne Mcneil MD (12/06 4921)      No evidence of cervical spinal cord demyelination. No stenosis.                Workstation performed: [id number]         MRI thoracic spine w wo contrast   Final Result by Julianne Mcneil MD (12/06 1633)      No evidence of thoracic cord demyelination. Single small T9-T10 disc herniation causing minimal central canal narrowing. Workstation performed: [id number]         MRI lumbar spine w wo contrast   Final Result by Stayc Kaplan MD (58/16 3347)      No evidence of conus demyelination. Degenerative disc disease from L3-L4 through L5-S1 with small disc herniations and minimal to mild stenosis. Details above.          Workstation performed: [id number]               Results from last 7 days   Lab Units 12/06/23  0524 12/05/23  1037   WBC Thousand/uL 18.48* 5.67   HEMOGLOBIN g/dL 15.4 15.6*   HEMATOCRIT % 45.3 44.0   PLATELETS Thousands/uL 239 225   NEUTROS ABS Thousands/µL  --  3.13         Results from last 7 days   Lab Units 12/06/23  0524 12/05/23  1037   SODIUM mmol/L 137 140   POTASSIUM mmol/L 4.3 3.7   CHLORIDE mmol/L 107 109*   CO2 mmol/L 26 25   ANION GAP mmol/L 4 6   BUN mg/dL 13 12   CREATININE mg/dL 0.78 0.77   EGFR ml/min/1.73sq m 100 102   CALCIUM mg/dL 9.3 8.7   MAGNESIUM mg/dL  --  1.8*   PHOSPHORUS mg/dL  --  3.0     Results from last 7 days   Lab Units 12/05/23  1037   AST U/L 14   ALT U/L 13   ALK PHOS U/L 49   TOTAL PROTEIN g/dL 6.6   ALBUMIN g/dL 4.1   TOTAL BILIRUBIN mg/dL 0.40     Results from last 7 days   Lab Units 12/06/23  0820 12/05/23  1509 12/05/23  1139   POC GLUCOSE mg/dl 114 113 75     Results from last 7 days   Lab Units 12/06/23  0524 12/05/23  1037   GLUCOSE RANDOM mg/dL 211* 90      Results from last 7 days   Lab Units 12/05/23  1037   TSH 3RD GENERATON uIU/mL 1.866      Results from last 7 days   Lab Units 12/05/23  1243   CLARITY UA  Clear   COLOR UA  Light Yellow   SPEC GRAV UA  1.007   PH UA  6.5   GLUCOSE UA mg/dl Negative   KETONES UA mg/dl Negative   BLOOD UA  Large*   PROTEIN UA mg/dl Negative   NITRITE UA  Negative   BILIRUBIN UA  Negative   UROBILINOGEN UA (BE) mg/dl <2.0   LEUKOCYTES UA  Negative   WBC UA /hpf 4-10*   RBC UA /hpf 4-10*   BACTERIA UA /hpf Occasional   EPITHELIAL CELLS WET PREP /hpf Occasional        ED Treatment:   Medication Administration from 12/05/2023 0714 to 12/05/2023 1641         Date/Time Order Dose Route Action     12/05/2023 1116 EST multivitamin stress formula tablet 1 tablet 1 tablet Oral Given     12/05/2023 1117 EST cholecalciferol (VITAMIN D3) tablet 400 Units 400 Units Oral Given     12/05/2023 1117 EST DULoxetine (CYMBALTA) delayed release capsule 30 mg 30 mg Oral Given     12/05/2023 1118 EST polyethylene glycol (MIRALAX) packet 17 g 17 g Oral Given     12/05/2023 1202 EST methylPREDNISolone sodium succinate (Solu-MEDROL) 1,000 mg in sodium chloride 0.9 % 250 mL IVPB 1,000 mg Intravenous New Bag     12/05/2023 1351 EST magnesium sulfate 2 g/50 mL IVPB (premix) 2 g 2 g Intravenous New Bag          Past Medical History:   Diagnosis Date    Abnormal Pap smear of cervix 2015    Depression     Multiple sclerosis (HCC)     Opioid abuse, in remission ([address])     Polycystic ovary syndrome     Substance abuse ([address])      Present on Admission:   Multiple sclerosis (HCC)   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(HCC)   PCOS (polycystic ovarian syndrome)   Recurrent major depressive disorder, in full remission ([address])      Admitting Diagnosis: Multiple sclerosis ([address]) [G35]  Multiple sclerosis [G35]  Age/Sex: 29 y.o. female  Admission Orders:  Regular Diet. BG checks ACHS. SCDs.     Scheduled Medications:  cholecalciferol, 400 Units, Oral, Daily  DULoxetine, 30 mg, Oral, Daily  insulin lispro, 1-5 Units, Subcutaneous, TID AC  melatonin, 3 mg, Oral, HS  methylPREDNISolone sodium succinate, 1,000 mg, Intravenous, Daily  multivitamin stress formula, 1 tablet, Oral, Daily  nicotine, 1 patch, Transdermal, Daily  norethindrone, 1 tablet, Oral, Daily  pantoprazole, 40 mg, Oral, Early Morning  polyethylene glycol, 17 g, Oral, Daily  saccharomyces boulardii, 250 mg, Oral, BID    Continuous IV Infusions: none    PRN Meds:  acetaminophen, 650 mg, Oral, Q6H PRN; 12/5 x1, 12/6 x1  LORazepam, 0.5 mg, Oral, Q12H PRN; 12/5 x1, 12/6 x1  LORazepam, 0.5 mg, Intravenous; 12/5 x1  ondansetron, 4 mg, Intravenous, Q6H PRN          Network Utilization Review Department  ATTENTION: Please call with any questions or concerns to [phone] and carefully listen to the prompts so that you are directed to the right person. All voicemails are confidential.   For Discharge needs, contact Care Management DC Support Team at [phone] opt. 2  Send all requests for admission clinical reviews, approved or denied determinations and any other requests to dedicated fax number below belonging to the campus where the patient is receiving treatment.  List of dedicated fax numbers for the Facilities:  [place] DENIALS (Administrative/Medical Necessity) [phone]   DISCHARGE SUPPORT TEAM (NETWORK) [address] [facility] (Maternity/NICU/Pediatrics) [phone]   [address] [facility] [address] [facility] [address] [facility] [phone]   [address] [place] [address] [address] [place] [address] [address] [address] [facility] [address] [phone]

## 2023-12-06 NOTE — PLAN OF CARE
Problem: PAIN - ADULT  Goal: Verbalizes/displays adequate comfort level or baseline comfort level  Description: Interventions:  - Encourage patient to monitor pain and request assistance  - Assess pain using appropriate pain scale  - Administer analgesics based on type and severity of pain and evaluate response  - Implement non-pharmacological measures as appropriate and evaluate response  - Consider cultural and social influences on pain and pain management  - Notify physician/advanced practitioner if interventions unsuccessful or patient reports new pain  Outcome: Progressing     Problem: INFECTION - ADULT  Goal: Absence or prevention of progression during hospitalization  Description: INTERVENTIONS:  - Assess and monitor for signs and symptoms of infection  - Monitor lab/diagnostic results  - Monitor all insertion sites, i.e. indwelling lines, tubes, and drains  - Monitor endotracheal if appropriate and nasal secretions for changes in amount and color  - [place] appropriate cooling/warming therapies per order  - Administer medications as ordered  - Instruct and encourage patient and family to use good hand hygiene technique  - Identify and instruct in appropriate isolation precautions for identified infection/condition  Outcome: Progressing

## 2023-12-06 NOTE — ASSESSMENT & PLAN NOTE
- Noted on thrombosis panel 9/2022 at [facility] that was obtained due to a family history of clotting disorders  - Not on AC PTA  - Pt has had a reaction to lovenox PTA, will hold off on pharmacological dvt prophylaxis and continue with mechanical prophylaxis and encourage frequent ambulation if safe

## 2023-12-07 LAB
GLUCOSE SERPL-MCNC: 104 MG/DL (ref 65–140)
GLUCOSE SERPL-MCNC: 121 MG/DL (ref 65–140)
GLUCOSE SERPL-MCNC: 122 MG/DL (ref 65–140)
GLUCOSE SERPL-MCNC: 139 MG/DL (ref 65–140)

## 2023-12-07 PROCEDURE — 82948 REAGENT STRIP/BLOOD GLUCOSE: CPT

## 2023-12-07 PROCEDURE — 99232 SBSQ HOSP IP/OBS MODERATE 35: CPT | Performed by: PSYCHIATRY & NEUROLOGY

## 2023-12-07 RX ADMIN — MELATONIN TAB 3 MG 3 MG: 3 TAB at 21:19

## 2023-12-07 RX ADMIN — CHOLECALCIFEROL TAB 10 MCG (400 UNIT) 400 UNITS: 10 TAB at 10:06

## 2023-12-07 RX ADMIN — B-COMPLEX W/ C & FOLIC ACID TAB 1 TABLET: TAB at 10:06

## 2023-12-07 RX ADMIN — LORAZEPAM 0.5 MG: 0.5 TABLET ORAL at 21:19

## 2023-12-07 RX ADMIN — Medication 250 MG: at 17:25

## 2023-12-07 RX ADMIN — PANTOPRAZOLE SODIUM 40 MG: 40 TABLET, DELAYED RELEASE ORAL at 05:59

## 2023-12-07 RX ADMIN — SODIUM CHLORIDE 1000 MG: 0.9 INJECTION, SOLUTION INTRAVENOUS at 10:13

## 2023-12-07 RX ADMIN — BISACODYL 10 MG: 10 SUPPOSITORY RECTAL at 21:24

## 2023-12-07 RX ADMIN — ACETAMINOPHEN 650 MG: 325 TABLET, FILM COATED ORAL at 10:06

## 2023-12-07 RX ADMIN — DULOXETINE HYDROCHLORIDE 30 MG: 30 CAPSULE, DELAYED RELEASE ORAL at 10:06

## 2023-12-07 RX ADMIN — LORAZEPAM 0.5 MG: 0.5 TABLET ORAL at 10:06

## 2023-12-07 RX ADMIN — POLYETHYLENE GLYCOL 3350 17 G: 17 POWDER, FOR SOLUTION ORAL at 10:06

## 2023-12-07 RX ADMIN — Medication 250 MG: at 10:13

## 2023-12-07 NOTE — DISCHARGE SUMMARY
NEUROLOGY RESIDENCY - DISCHARGE SUMMARY     Name: Pop Méndez   Age & Sex: 29 y.o. female   MRN: [id number]  Unit/Bed#: [address] 612-01   Encounter: [id number]    Discharging Resident Physician: Anna Yap DO  Attending: Miguel Paredes MD  PCP: WINSOME Zavala  Admission Date: 12/5/2023  Discharge Date: 12/10/23    Pop Méndez will need follow up in as soon as possible with multiple sclerosis attending . Will defer any further testing to the outpatient Neurologist.     ASSESSMENT & PLAN     * Multiple sclerosis [facility])  Assessment & Plan  Assessment:  Pop Méndez is a 29 y.o. female with a PMHx of MS who presented on 12/5 with 1-week on and off symptoms of vertigo, particularly at night. Initial symptoms were visual symptoms including diplopia. These symptoms were sudden in onset over week and lasted 7 days. Symptoms did fully resolve. Over the past month, she has also developed new symptoms of urinary tract symptoms including urge incontinence. Previous evaluation has included MRI of brain, CSF analysis, and Lyme antibody. MRI brain in 2019 showed multiple foci of T2 hyperintensity identified in the supratentorial white matter compatible with MS disease. Lyme negative at the time. MRI MS brain w/wo contrast, 11/30/23: Multiple periventricular, subcortical, and pericallosal white matter demyelinating lesions consistent with the patient's history of multiple sclerosis, moderate plaque burden. New enhancing lesions within the right frontoparietal, left temporal, right periatrial, and right centrum semiovale white matter as detailed compatible with acute demyelination compared to the prior outside 1/22/2021 examination. The larger lesions demonstrate surrounding vasogenic edema. There are additional new subcentimeter lesions without enhancement including a right pontine focus. Negative urine pregnancy. Neg U/A for infection. TSH WNL.   MRI C, T, and L spine 12/6/23: Shows no evidence of spinal cord demyelination. Single small T9-T10 disc herniation identified causing minimal central canal narrowing. Degenerative disc disease from L3-L4 through L5-S1 with small disc herniations and minimal to mild stenosis. Prior treatments include: glatiramer acetate (COPAXONE) - did not tolerate 2/2 infection while on it     Impression: Clinical presentation and neuroimaging concerning for MS flare. Completed 5 day course of IV steroids. Patient back to baseline. Neurological symptoms have resolved, including urinary incontinence. Plan:  Discussed with attending, Dr. Maximo Zapata  Solu-Medrol 1g IV daily for 5 days completed   Accuchecks and SSI TID AC as needed - d/c'ed upon discharge  Patient will need to follow up with OP multiple sclerosis attending within 4 weeks or sooner if possible. Communicated with outpatient team to schedule appointment    Leukocytosis  Assessment & Plan  WBCs elevated most likely in the setting of IV solumedrol.     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[facility])  Assessment & Plan  - Noted on thrombosis panel 9/2022 at [facility] that was obtained due to a family history of clotting disorders  - Not on AC PTA  - Pt has had a reaction to lovenox PTA, will hold off on pharmacological dvt prophylaxis and continue with mechanical prophylaxis and encourage frequent ambulation if safe    PCOS (polycystic ovarian syndrome)  Assessment & Plan  - History of PCOS  - Pt maintained on Jencycla (Norethisterone) 0.35mg daily PTA - This has been Ok'd for use by OB-GYN in the setting of her ATIII deficiency    Recurrent major depressive disorder, in full remission ([address])  Assessment & Plan  - Pt maintained on duloxetine 30mg qd PTA, continue  - Ativan PRN for breakthrough anxiety, pt reports she is very anxious about her MS flare        Disposition:   Home    Reason for Admission: MS flare     Consultations During Hospital Stay:  IP CONSULT TO NEUROLOGY     Procedures Performed:   None    Significant Findings / Test Results: Labs:  Results from last 7 days   Lab Units 12/08/23  0816 12/06/23  0524 12/05/23  1037   WBC Thousand/uL 16.66* 18.48* 5.67   HEMOGLOBIN g/dL 15.0 15.4 15.6*   HEMATOCRIT % 44.6 45.3 44.0   PLATELETS Thousands/uL 235 239 225   NEUTROS PCT % 83*  --  55   LYMPHS PCT % 10*  --  32   MONOS PCT % 6  --  9   EOS PCT % 0  --  3     Results from last 7 days   Lab Units 12/06/23  0524 12/05/23  1037   SODIUM mmol/L 137 140   POTASSIUM mmol/L 4.3 3.7   CHLORIDE mmol/L 107 109*   CO2 mmol/L 26 25   BUN mg/dL 13 12   CREATININE mg/dL 0.78 0.77   ANION GAP mmol/L 4 6   CALCIUM mg/dL 9.3 8.7   ALBUMIN g/dL  --  4.1   TOTAL BILIRUBIN mg/dL  --  0.40   ALK PHOS U/L  --  49   ALT U/L  --  13   AST U/L  --  14   GLUCOSE RANDOM mg/dL 211* 90          Results from last 7 days   Lab Units 12/10/23  0759 12/10/23  0202 12/09/23  2059 12/09/23  1601 12/09/23  1141 12/09/23  0906 12/09/23  0815 12/08/23  2040 12/08/23  1653 12/08/23  1126 12/08/23  0807 12/07/23  2051   POC GLUCOSE mg/dl 103 137 128 163* 103 129 102 162* 261* 120 135 122             Results from last 7 days   Lab Units 12/06/23  0524 12/05/23  1037   MAGNESIUM mg/dL 2.2 1.8*   PHOSPHORUS mg/dL  --  3.0        ]    Imaging:  MRI lumbar spine w wo contrast    Result Date: 12/6/2023  Impression: No evidence of conus demyelination. Degenerative disc disease from L3-L4 through L5-S1 with small disc herniations and minimal to mild stenosis. Details above. Workstation performed: [id number]     MRI thoracic spine w wo contrast    Result Date: 12/6/2023  Impression: No evidence of thoracic cord demyelination. Single small T9-T10 disc herniation causing minimal central canal narrowing. Workstation performed: [id number]     MRI cervical spine w wo contrast    Result Date: 12/6/2023  Impression: No evidence of cervical spinal cord demyelination. No stenosis.  Workstation performed: [id number]       Incidental Findings:   None    Test Results Pending at Discharge (will require follow up):   None     Outpatient Tests Requested:  None    Complications:  None    Hospital Course:     Amrit Ivory is a 29 y.o. female with a PMHx of MS who originally presented to the hospital on 12/5/2023 due to intermittent vertigo with duration of 1 week. Her MRI Brain MS w and wo contrast was outpatient was consistent with new lesions. As a result, she presented to the ED for the treatment of her MS flare. During this hospital stay, the patient was started on Solu-Medrol 1g IV daily for 5 days and has responded well. Patient has had complete resolution of symptoms and has no complaints. In the hospital, MRI C, T, L spine were completed and showed no evidence of spinal cord demyelination but there was evidence of DJD at multiple levels. Patient recommended to follow with her PCP for further management given that she is asymptomatic at this time. Patient to be scheduled with a Neurology MS specialist outpatient for establishment of care. At this time, there were no medication changes during her hospital stay. She is stable to be discharged today. Condition at Discharge: good     Discharge Day Visit / Exam:     Subjective:  Patient seen and evaluated at bedside this morning. No acute over night events. She has no complaints and is ready to be discharged. Patient currently denies dizziness, nausea, vertigo, headache, diplopia, chest pain, and impaired coordination. Vitals: Blood Pressure: 130/82 (12/10/23 0748)  Pulse: 58 (12/10/23 0748)  Temperature: 98.1 °F (36.7 °C) (12/10/23 0748)  Temp Source: Oral (12/05/23 0844)  Respirations: 18 (12/09/23 2200)  Height: 5' 5" (165.1 cm) (12/05/23 1730)  Weight - Scale: 81.6 kg (180 lb) (12/05/23 1730)  SpO2: 97 % (12/10/23 0748)    Exam:     Physical Exam  Vitals reviewed. Constitutional:       Appearance: Normal appearance. HENT:      Head: Normocephalic and atraumatic.       Nose: Nose normal.      Mouth/Throat:      Mouth: Mucous membranes are moist. Eyes:      Extraocular Movements: Extraocular movements intact and EOM normal.      Conjunctiva/sclera: Conjunctivae normal.      Pupils: Pupils are equal, round, and reactive to light. Cardiovascular:      Rate and Rhythm: Normal rate and regular rhythm. Pulmonary:      Effort: Pulmonary effort is normal. No respiratory distress. Breath sounds: Normal breath sounds. No wheezing. Abdominal:      General: Abdomen is flat. Bowel sounds are normal. There is no distension. Palpations: Abdomen is soft. Tenderness: There is no abdominal tenderness. Skin:     General: Skin is warm. Neurological:      Mental Status: She is alert and oriented to person, place, and time. Cranial Nerves: Cranial nerves 2-12 are intact. Motor: Motor strength is normal.     Coordination: Finger-Nose-Finger Test and Heel to [facility] Test normal.      Deep Tendon Reflexes:      Reflex Scores:       Tricep reflexes are 2+ on the right side and 2+ on the left side. Bicep reflexes are 2+ on the right side and 2+ on the left side. Brachioradialis reflexes are 2+ on the right side and 2+ on the left side. Patellar reflexes are 3+ on the right side and 3+ on the left side. Achilles reflexes are 3+ on the right side and 3+ on the left side. Psychiatric:         Speech: Speech normal.         Neurologic Exam     Mental Status   Oriented to person, place, and time. Attention: normal. Concentration: normal.   Speech: speech is normal   Level of consciousness: alert  Knowledge: good. Cranial Nerves   Cranial nerves II through XII intact. CN II   Visual fields full to confrontation. CN III, IV, VI   Pupils are equal, round, and reactive to light. Extraocular motions are normal.   Right pupil: Consensual response: intact. Left pupil: Consensual response: intact. Nystagmus: none     CN V   Facial sensation intact. CN VII   Facial expression full, symmetric.      CN VIII   CN VIII normal.     CN IX, X   CN IX normal.   CN X normal.     CN XI   CN XI normal.     CN XII   CN XII normal.     Motor Exam   Muscle bulk: normal  Overall muscle tone: normal    Strength   Strength 5/5 throughout. Sensory Exam   Pinprick normal.     Gait, Coordination, and Reflexes     Coordination   Finger to nose coordination: normal  Heel to shin coordination: normal    Reflexes   Right brachioradialis: 2+  Left brachioradialis: 2+  Right biceps: 2+  Left biceps: 2+  Right triceps: 2+  Left triceps: 2+  Right patellar: 3+  Left patellar: 3+  Right achilles: 3+  Left achilles: 3+         Discussion with Family:  Patient informed family member. Discharge instructions/Information to patient and family:   See after visit summary for information provided to patient and family. Provisions for Follow-Up Care:  See after visit summary for information related to follow-up care and any pertinent home health orders. Planned Readmission: No    Discharge Statement:  I spent 35 minutes discharging the patient. This time was spent on the day of discharge. I had direct contact with the patient on the day of discharge. Greater than 50% of the total time was spent examining patient, answering all patient questions, arranging and discussing plan of care with patient as well as directly providing post-discharge instructions. Additional time then spent on discharge activities. Discharge Medications:  See after visit summary for reconciled discharge medications provided to patient and family.       ** Please Note: This note has been constructed using a voice recognition system **      ==  DO Stephanie Winston Neurology Residency, PGY-2

## 2023-12-07 NOTE — ASSESSMENT & PLAN NOTE
- Noted on thrombosis panel 9/2022 at [facility] [place] [place] that was obtained due to a family history of clotting disorders  - Not on AC PTA  - Pt has had a reaction to lovenox PTA, will hold off on pharmacological dvt prophylaxis and continue with mechanical prophylaxis and encourage frequent ambulation if safe

## 2023-12-07 NOTE — PROGRESS NOTES
NEUROLOGY RESIDENCY PROGRESS NOTE     Name: Jm Pollock   Age & Sex: 29 y.o. female   MRN: [id number]  Unit/Bed#: -82   Encounter: [id number]    Jm Pollock will need follow up in in 6 weeks with neurovascular attending . She will not require outpatient neurological testing. Pending for discharge: Steroid completion     ASSESSMENT & PLAN     * Multiple sclerosis [facility])  Assessment & Plan  Assessment:  Jm Pollock is a 29 y.o. female with a PMHx of MS who presented on 12/5 with 1-week on and off symptoms of vertigo, particularly at night. Initial symptoms were visual symptoms including diplopia. These symptoms were sudden in onset over week and lasted 7 days. Symptoms did fully resolve. Over the past month, she has also developed new symptoms of urinary tract symptoms including urge incontinence. Previous evaluation has included MRI of brain, CSF analysis, and Lyme antibody. MRI brain in 2019 showed multiple foci of T2 hyperintensity identified in the supratentorial white matter compatible with MS disease. Lyme negative at the time. MRI MS brain w/wo contrast, 11/30/23: Multiple periventricular, subcortical, and pericallosal white matter demyelinating lesions consistent with the patient's history of multiple sclerosis, moderate plaque burden. New enhancing lesions within the right frontoparietal, left temporal, right periatrial, and right centrum semiovale white matter as detailed compatible with acute demyelination compared to the prior outside 1/22/2021 examination. The larger lesions demonstrate surrounding vasogenic edema. There are additional new subcentimeter lesions without enhancement including a right pontine focus. Negative urine pregnancy. Neg U/A for infection. TSH WNL. MRI C, T, and L spine 12/6/23: Shows no evidence of spinal cord demyelination. Single small T9-T10 disc herniation identified causing minimal central canal narrowing.  Degenerative disc disease from L3-L4 through L5-S1 with small disc herniations and minimal to mild stenosis. Prior treatments include: glatiramer acetate (COPAXONE) - did not tolerate 2/2 infection while on it     Impression: Clinical presentation and neuroimaging concerning for MS flare. Plan:  Discussed with attending, Dr. Maximo Zapata  Solu-Medrol 1g IV daily for 5 days - Day 3/5  Accuchecks and SSI TID AC as needed  Continue Vit D3 supplementation  Patient will need to follow up with OP multiple sclerosis attending within 4 weeks or sooner if possible. Leukocytosis  Assessment & Plan  WBCs elevated most likely in the setting of IV solumedrol. 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[facility])  Assessment & Plan  - Noted on thrombosis panel 9/2022 at [facility] that was obtained due to a family history of clotting disorders  - Not on AC PTA  - Pt has had a reaction to lovenox PTA, will hold off on pharmacological dvt prophylaxis and continue with mechanical prophylaxis and encourage frequent ambulation if safe    PCOS (polycystic ovarian syndrome)  Assessment & Plan  - History of PCOS  - Pt maintained on Jencycla (Norethisterone) 0.35mg daily PTA - This has been Ok'd for use by OB-GYN in the setting of her ATIII deficiency    Recurrent major depressive disorder, in full remission ([address])  Assessment & Plan  - Pt maintained on duloxetine 30mg qd PTA, continue  - Ativan PRN for breakthrough anxiety, pt reports she is very anxious about her MS flare        SUBJECTIVE     Patient was seen and examined today. No acute events overnight. Patient says she is constipated likely due to not drinking enough water and lack of movement. Patient says she has had IBS-C in the past. She has no complaints otherwise. She currently denies dizziness, nausea, vertigo, diplopia, chest pain, and impaired coordination. Pertinent Negatives include: numbness, weakness, speech or visual changes     Review of Systems   Constitutional:  Negative for fatigue and fever.    HENT:  Negative for hearing loss and tinnitus. Eyes:  Negative for visual disturbance. Respiratory:  Negative for cough and chest tightness. Cardiovascular:  Negative for chest pain. Gastrointestinal:  Positive for constipation. Negative for abdominal pain and diarrhea. Genitourinary:  Negative for difficulty urinating. Neurological:  Negative for dizziness, weakness and numbness. Psychiatric/Behavioral:  Negative for sleep disturbance. All other systems reviewed and are negative. OBJECTIVE     Patient ID: Harry Marcus is a 29 y.o. female. Vitals:    23 1555 23 2214 23 0400 23 0558   BP: 114/70 122/80  112/67   Pulse: 92 82  77   Resp: 18   18   Temp: 98.3 °F (36.8 °C) 97.9 °F (36.6 °C)  97.8 °F (36.6 °C)   TempSrc:       SpO2: 93% 95% 95% 97%   Weight:       Height:          Temperature:   Temp (24hrs), Av °F (36.7 °C), Min:97.8 °F (36.6 °C), Max:98.3 °F (36.8 °C)    Temperature: 97.8 °F (36.6 °C)      Physical Exam  Vitals reviewed. Constitutional:       Appearance: Normal appearance. HENT:      Head: Normocephalic and atraumatic. Nose: Nose normal.      Mouth/Throat:      Mouth: Mucous membranes are moist.      Pharynx: Oropharynx is clear. Eyes:      Extraocular Movements: Extraocular movements intact and EOM normal.      Pupils: Pupils are equal, round, and reactive to light. Cardiovascular:      Rate and Rhythm: Normal rate and regular rhythm. Pulmonary:      Effort: Pulmonary effort is normal. No respiratory distress. Breath sounds: Normal breath sounds. No wheezing. Abdominal:      General: Abdomen is flat. Bowel sounds are normal. There is no distension. Palpations: Abdomen is soft. Tenderness: There is no abdominal tenderness. Skin:     General: Skin is warm. Neurological:      Mental Status: She is alert and oriented to person, place, and time. Cranial Nerves: Cranial nerves 2-12 are intact.       Motor: Motor strength is normal.     Coordination: Finger-Nose-Finger Test and Heel to [facility] Test normal.      Deep Tendon Reflexes:      Reflex Scores:       Tricep reflexes are 2+ on the right side and 2+ on the left side. Bicep reflexes are 2+ on the right side and 2+ on the left side. Brachioradialis reflexes are 2+ on the right side and 2+ on the left side. Patellar reflexes are 3+ on the right side and 3+ on the left side. Achilles reflexes are 3+ on the right side and 3+ on the left side. Psychiatric:         Speech: Speech normal.          Neurologic Exam     Mental Status   Oriented to person, place, and time. Attention: normal. Concentration: normal.   Speech: speech is normal   Level of consciousness: alert  Knowledge: good. Cranial Nerves   Cranial nerves II through XII intact. CN II   Visual fields full to confrontation. CN III, IV, VI   Pupils are equal, round, and reactive to light. Extraocular motions are normal.   Right pupil: Consensual response: intact. Left pupil: Consensual response: intact. Nystagmus: none     CN V   Facial sensation intact. CN VII   Facial expression full, symmetric. CN VIII   CN VIII normal.     CN IX, X   CN IX normal.   CN X normal.     CN XI   CN XI normal.     CN XII   CN XII normal.     Motor Exam   Muscle bulk: normal  Overall muscle tone: normal    Strength   Strength 5/5 throughout. Sensory Exam   Pinprick normal.     Gait, Coordination, and Reflexes     Coordination   Finger to nose coordination: normal  Heel to shin coordination: normal    Reflexes   Right brachioradialis: 2+  Left brachioradialis: 2+  Right biceps: 2+  Left biceps: 2+  Right triceps: 2+  Left triceps: 2+  Right patellar: 3+  Left patellar: 3+  Right achilles: 3+  Left achilles: 3+  Right plantar: normal  Left plantar: normal  Right ankle clonus: absent  Left ankle clonus: absent          LABORATORY DATA     Labs: I have personally reviewed pertinent reports.     Results from last 7 days   Lab Units 12/06/23  0524 12/05/23  1037   WBC Thousand/uL 18.48* 5.67   HEMOGLOBIN g/dL 15.4 15.6*   HEMATOCRIT % 45.3 44.0   PLATELETS Thousands/uL 239 225   NEUTROS PCT %  --  55   MONOS PCT %  --  9   EOS PCT %  --  3      Results from last 7 days   Lab Units 12/06/23  0524 12/05/23  1037   SODIUM mmol/L 137 140   POTASSIUM mmol/L 4.3 3.7   CHLORIDE mmol/L 107 109*   CO2 mmol/L 26 25   BUN mg/dL 13 12   CREATININE mg/dL 0.78 0.77   CALCIUM mg/dL 9.3 8.7   ALK PHOS U/L  --  49   ALT U/L  --  13   AST U/L  --  14     Results from last 7 days   Lab Units 12/06/23  0524 12/05/23  1037   MAGNESIUM mg/dL 2.2 1.8*     Results from last 7 days   Lab Units 12/05/23  1037   PHOSPHORUS mg/dL 3.0                    IMAGING & DIAGNOSTIC TESTING     Radiology Results: I have personally reviewed pertinent reports. and I have personally reviewed pertinent films in PACS    MRI cervical spine w wo contrast   Final Result by Alina Song MD (12/06 4472)      No evidence of cervical spinal cord demyelination. No stenosis. Workstation performed: [id number]         MRI thoracic spine w wo contrast   Final Result by Alina Song MD (12/06 7301)      No evidence of thoracic cord demyelination. Single small T9-T10 disc herniation causing minimal central canal narrowing. Workstation performed: [id number]         MRI lumbar spine w wo contrast   Final Result by Alina Song MD (70/00 1933)      No evidence of conus demyelination. Degenerative disc disease from L3-L4 through L5-S1 with small disc herniations and minimal to mild stenosis. Details above. Workstation performed: [id number]             Other Diagnostic Testing: I have personally reviewed pertinent reports.       ACTIVE MEDICATIONS     Current Facility-Administered Medications   Medication Dose Route Frequency    acetaminophen (TYLENOL) tablet 650 mg  650 mg Oral Q6H PRN    bisacodyl (DULCOLAX) rectal suppository 10 mg  10 mg Rectal Daily PRN    cholecalciferol (VITAMIN D3) tablet 400 Units  400 Units Oral Daily    DULoxetine (CYMBALTA) delayed release capsule 30 mg  30 mg Oral Daily    insulin lispro (HumaLOG) 100 units/mL subcutaneous injection 1-5 Units  1-5 Units Subcutaneous TID AC    LORazepam (ATIVAN) tablet 0.5 mg  0.5 mg Oral Q12H PRN    melatonin tablet 3 mg  3 mg Oral HS    methylPREDNISolone sodium succinate (Solu-MEDROL) 1,000 mg in sodium chloride 0.9 % 250 mL IVPB  1,000 mg Intravenous Daily    multivitamin stress formula tablet 1 tablet  1 tablet Oral Daily    nicotine (NICODERM CQ) 21 mg/24 hr TD 24 hr patch 1 patch  1 patch Transdermal Daily    norethindrone (MICRONOR) tablet 0.35 mg  1 tablet Oral Daily    ondansetron (ZOFRAN) injection 4 mg  4 mg Intravenous Q6H PRN    pantoprazole (PROTONIX) EC tablet 40 mg  40 mg Oral Early Morning    polyethylene glycol (MIRALAX) packet 17 g  17 g Oral Daily    saccharomyces boulardii (FLORASTOR) capsule 250 mg  250 mg Oral BID       Prior to Admission medications    Medication Sig Start Date End Date Taking?  Authorizing Provider   ALPRAZolam Romy Quill) 0.5 mg tablet Take 30 minutes prior to MRI may repeat x1 no driving if taking 34/05/75   WINSOME Garber   cholecalciferol (VITAMIN D3) 400 units tablet Take 400 Units by mouth daily    Historical Provider, MD   DULoxetine (CYMBALTA) 30 mg delayed release capsule Take 1 capsule (30 mg total) by mouth daily 9/28/23 9/27/24  WINSOME Garber   Jencycla 0.35 MG tablet take 1 tablet by mouth once daily 8/28/23   Jeanine Grant MD   KRILL OIL PO Take by mouth    Historical Provider, MD   Multiple Vitamin (MULTI VITAMIN DAILY PO) Take by mouth    Historical Provider, MD   Probiotic Product (PROBIOTIC-10 PO) Take by mouth    Historical Provider, MD   Turmeric 500 MG CAPS Take by mouth    Historical Provider, MD         VTE Pharmacologic Prophylaxis: Pharmacologic VTE Prophylaxis contraindicated due to reaction to Lovenox  VTE Mechanical Prophylaxis: Frequent ambulation    ======    I have discussed the patient's history, physical exam findings, assessment, and plan in detail with attending, Dr. Kj Rahman    Thank you for allowing me to participate in the care of your patient, Katelyn Wilkes.     [address] [facility] Neurology Residency, PGY2    [address] [facility] Student, MS4

## 2023-12-07 NOTE — ASSESSMENT & PLAN NOTE
Assessment:  Kaleb Callahan is a 29 y.o. female with a PMHx of MS who presented on 12/5 with 1-week on and off symptoms of vertigo, particularly at night. Initial symptoms were visual symptoms including diplopia. These symptoms were sudden in onset over week and lasted 7 days. Symptoms did fully resolve. Over the past month, she has also developed new symptoms of urinary tract symptoms including urge incontinence. Previous evaluation has included MRI of brain, CSF analysis, and Lyme antibody. MRI brain in 2019 showed multiple foci of T2 hyperintensity identified in the supratentorial white matter compatible with MS disease. Lyme negative at the time. MRI MS brain w/wo contrast, 11/30/23: Multiple periventricular, subcortical, and pericallosal white matter demyelinating lesions consistent with the patient's history of multiple sclerosis, moderate plaque burden. New enhancing lesions within the right frontoparietal, left temporal, right periatrial, and right centrum semiovale white matter as detailed compatible with acute demyelination compared to the prior outside 1/22/2021 examination. The larger lesions demonstrate surrounding vasogenic edema. There are additional new subcentimeter lesions without enhancement including a right pontine focus. Negative urine pregnancy. Neg U/A for infection. TSH WNL. MRI C, T, and L spine 12/6/23: Shows no evidence of spinal cord demyelination. Single small T9-T10 disc herniation identified causing minimal central canal narrowing. Degenerative disc disease from L3-L4 through L5-S1 with small disc herniations and minimal to mild stenosis. Prior treatments include: glatiramer acetate (COPAXONE) - did not tolerate 2/2 infection while on it     Impression: Clinical presentation and neuroimaging concerning for MS flare.     Plan:  Discussed with attending, Dr. Irma Ovalles  Solu-Medrol 1g IV daily for 5 days - Day 3/5  Accuchecks and SSI TID AC as needed  Continue Vit D3 supplementation  Patient will need to follow up with OP multiple sclerosis attending within 4 weeks or sooner if possible.

## 2023-12-07 NOTE — PLAN OF CARE
Problem: PAIN - ADULT  Goal: Verbalizes/displays adequate comfort level or baseline comfort level  Description: Interventions:  - Encourage patient to monitor pain and request assistance  - Assess pain using appropriate pain scale  - Administer analgesics based on type and severity of pain and evaluate response  - Implement non-pharmacological measures as appropriate and evaluate response  - Consider cultural and social influences on pain and pain management  - Notify physician/advanced practitioner if interventions unsuccessful or patient reports new pain  Outcome: Progressing     Problem: INFECTION - ADULT  Goal: Absence or prevention of progression during hospitalization  Description: INTERVENTIONS:  - Assess and monitor for signs and symptoms of infection  - Monitor lab/diagnostic results  - Monitor all insertion sites, i.e. indwelling lines, tubes, and drains  - Monitor endotracheal if appropriate and nasal secretions for changes in amount and color  - [place] appropriate cooling/warming therapies per order  - Administer medications as ordered  - Instruct and encourage patient and family to use good hand hygiene technique  - Identify and instruct in appropriate isolation precautions for identified infection/condition  Outcome: Progressing     Problem: SAFETY ADULT  Goal: Patient will remain free of falls  Description: INTERVENTIONS:  - Educate patient/family on patient safety including physical limitations  - Instruct patient to call for assistance with activity   - Consult OT/PT to assist with strengthening/mobility   - Keep Call bell within reach  - Keep bed low and locked with side rails adjusted as appropriate  - Keep care items and personal belongings within reach  - Initiate and maintain comfort rounds    Outcome: Progressing     Problem: DISCHARGE PLANNING  Goal: Discharge to home or other facility with appropriate resources  Description: INTERVENTIONS:  - Identify barriers to discharge w/patient and caregiver  - Arrange for needed discharge resources and transportation as appropriate  - Identify discharge learning needs (meds, wound care, etc.)  - Arrange for interpretive services to assist at discharge as needed  - Refer to Case Management Department for coordinating discharge planning if the patient needs post-hospital services based on physician/advanced practitioner order or complex needs related to functional status, cognitive ability, or social support system  Outcome: Progressing

## 2023-12-07 NOTE — RESTORATIVE TECHNICIAN NOTE
Restorative Technician Note      Patient Name: Nia Armendariz     Restorative Tech Visit Date: 12/07/23  Note Type: Mobility  Patient Position Upon Consult: Supine  Activity Performed: Ambulated  Assistive Device: Other (Comment) (No AD)  Patient Position at End of Consult: Supine;  All needs within reach    EULOGIO Hillman

## 2023-12-07 NOTE — ED ATTENDING ATTESTATION
12/5/2023  IElgin MD, saw and evaluated the patient. I have discussed the patient with the resident/non-physician practitioner and agree with the resident's/non-physician practitioner's findings, Plan of Care, and MDM as documented in the resident's/non-physician practitioner's note, except where noted. All available labs and Radiology studies were reviewed. I was present for key portions of any procedure(s) performed by the resident/non-physician practitioner and I was immediately available to provide assistance. At this point I agree with the current assessment done in the Emergency Department.   I have conducted an independent evaluation of this patient a history and physical is as follows:    ED Course     Impression:  Abnormal outpatient MRI h/o MS     DDX:  Acute exacerbation of MS     Case d/w neurology - will admit to neuro service for further evaluation and management         Critical Care Time  Procedures

## 2023-12-08 LAB
BASOPHILS # BLD AUTO: 0.02 THOUSANDS/ÂΜL (ref 0–0.1)
BASOPHILS NFR BLD AUTO: 0 % (ref 0–1)
EOSINOPHIL # BLD AUTO: 0.04 THOUSAND/ÂΜL (ref 0–0.61)
EOSINOPHIL NFR BLD AUTO: 0 % (ref 0–6)
ERYTHROCYTE [DISTWIDTH] IN BLOOD BY AUTOMATED COUNT: 12.4 % (ref 11.6–15.1)
GLUCOSE SERPL-MCNC: 120 MG/DL (ref 65–140)
GLUCOSE SERPL-MCNC: 135 MG/DL (ref 65–140)
GLUCOSE SERPL-MCNC: 162 MG/DL (ref 65–140)
GLUCOSE SERPL-MCNC: 261 MG/DL (ref 65–140)
HCT VFR BLD AUTO: 44.6 % (ref 34.8–46.1)
HGB BLD-MCNC: 15 G/DL (ref 11.5–15.4)
IMM GRANULOCYTES # BLD AUTO: 0.17 THOUSAND/UL (ref 0–0.2)
IMM GRANULOCYTES NFR BLD AUTO: 1 % (ref 0–2)
LYMPHOCYTES # BLD AUTO: 1.7 THOUSANDS/ÂΜL (ref 0.6–4.47)
LYMPHOCYTES NFR BLD AUTO: 10 % (ref 14–44)
MCH RBC QN AUTO: 32.3 PG (ref 26.8–34.3)
MCHC RBC AUTO-ENTMCNC: 33.6 G/DL (ref 31.4–37.4)
MCV RBC AUTO: 96 FL (ref 82–98)
MONOCYTES # BLD AUTO: 0.94 THOUSAND/ÂΜL (ref 0.17–1.22)
MONOCYTES NFR BLD AUTO: 6 % (ref 4–12)
NEUTROPHILS # BLD AUTO: 13.79 THOUSANDS/ÂΜL (ref 1.85–7.62)
NEUTS SEG NFR BLD AUTO: 83 % (ref 43–75)
NRBC BLD AUTO-RTO: 0 /100 WBCS
PLATELET # BLD AUTO: 235 THOUSANDS/UL (ref 149–390)
PMV BLD AUTO: 9.7 FL (ref 8.9–12.7)
RBC # BLD AUTO: 4.64 MILLION/UL (ref 3.81–5.12)
WBC # BLD AUTO: 16.66 THOUSAND/UL (ref 4.31–10.16)

## 2023-12-08 PROCEDURE — 85025 COMPLETE CBC W/AUTO DIFF WBC: CPT

## 2023-12-08 PROCEDURE — 99232 SBSQ HOSP IP/OBS MODERATE 35: CPT | Performed by: PSYCHIATRY & NEUROLOGY

## 2023-12-08 PROCEDURE — 82948 REAGENT STRIP/BLOOD GLUCOSE: CPT

## 2023-12-08 RX ADMIN — CHOLECALCIFEROL TAB 10 MCG (400 UNIT) 400 UNITS: 10 TAB at 09:43

## 2023-12-08 RX ADMIN — LORAZEPAM 0.5 MG: 0.5 TABLET ORAL at 09:43

## 2023-12-08 RX ADMIN — MELATONIN TAB 3 MG 3 MG: 3 TAB at 21:46

## 2023-12-08 RX ADMIN — DULOXETINE HYDROCHLORIDE 30 MG: 30 CAPSULE, DELAYED RELEASE ORAL at 09:43

## 2023-12-08 RX ADMIN — PANTOPRAZOLE SODIUM 40 MG: 40 TABLET, DELAYED RELEASE ORAL at 05:37

## 2023-12-08 RX ADMIN — Medication 250 MG: at 17:38

## 2023-12-08 RX ADMIN — Medication 250 MG: at 09:43

## 2023-12-08 RX ADMIN — B-COMPLEX W/ C & FOLIC ACID TAB 1 TABLET: TAB at 09:43

## 2023-12-08 RX ADMIN — SODIUM CHLORIDE 1000 MG: 0.9 INJECTION, SOLUTION INTRAVENOUS at 09:42

## 2023-12-08 RX ADMIN — POLYETHYLENE GLYCOL 3350 17 G: 17 POWDER, FOR SOLUTION ORAL at 09:42

## 2023-12-08 RX ADMIN — INSULIN LISPRO 2 UNITS: 100 INJECTION, SOLUTION INTRAVENOUS; SUBCUTANEOUS at 17:35

## 2023-12-08 RX ADMIN — LORAZEPAM 0.5 MG: 0.5 TABLET ORAL at 21:46

## 2023-12-08 NOTE — PLAN OF CARE
Problem: PAIN - ADULT  Goal: Verbalizes/displays adequate comfort level or baseline comfort level  Description: Interventions:  - Encourage patient to monitor pain and request assistance  - Assess pain using appropriate pain scale  - Administer analgesics based on type and severity of pain and evaluate response  - Implement non-pharmacological measures as appropriate and evaluate response  - Consider cultural and social influences on pain and pain management  - Notify physician/advanced practitioner if interventions unsuccessful or patient reports new pain  12/8/2023 0244 by Joseph Marshall RN  Outcome: Progressing  12/8/2023 0244 by Joseph Marshall RN  Outcome: Progressing     Problem: INFECTION - ADULT  Goal: Absence or prevention of progression during hospitalization  Description: INTERVENTIONS:  - Assess and monitor for signs and symptoms of infection  - Monitor lab/diagnostic results  - Monitor all insertion sites, i.e. indwelling lines, tubes, and drains  - Monitor endotracheal if appropriate and nasal secretions for changes in amount and color  - [place] appropriate cooling/warming therapies per order  - Administer medications as ordered  - Instruct and encourage patient and family to use good hand hygiene technique  - Identify and instruct in appropriate isolation precautions for identified infection/condition  12/8/2023 0244 by Joseph Marshall RN  Outcome: Progressing  12/8/2023 0244 by Joseph Marshall RN  Outcome: Progressing     Problem: SAFETY ADULT  Goal: Patient will remain free of falls  Description: INTERVENTIONS:  - Educate patient/family on patient safety including physical limitations  - Instruct patient to call for assistance with activity   - Consult OT/PT to assist with strengthening/mobility   - Keep Call bell within reach  - Keep bed low and locked with side rails adjusted as appropriate  - Keep care items and personal belongings within reach  - Initiate and maintain comfort rounds    12/8/2023 4904 by Tariq Kerr RN  Outcome: Progressing  12/8/2023 0244 by Tariq Kerr RN  Outcome: Progressing     Problem: DISCHARGE PLANNING  Goal: Discharge to home or other facility with appropriate resources  Description: INTERVENTIONS:  - Identify barriers to discharge w/patient and caregiver  - Arrange for needed discharge resources and transportation as appropriate  - Identify discharge learning needs (meds, wound care, etc.)  - Arrange for interpretive services to assist at discharge as needed  - Refer to Case Management Department for coordinating discharge planning if the patient needs post-hospital services based on physician/advanced practitioner order or complex needs related to functional status, cognitive ability, or social support system  12/8/2023 0244 by Tariq Kerr RN  Outcome: Progressing  12/8/2023 0244 by Tariq Kerr RN  Outcome: Progressing

## 2023-12-08 NOTE — PROGRESS NOTES
NEUROLOGY RESIDENCY PROGRESS NOTE     Name: Vickie Valdez   Age & Sex: 29 y.o. female   MRN: [id number]  Unit/Bed#: MCXX 917-90   Encounter: [id number]    Vickie Valdez will need follow up in in 6 weeks with neurovascular attending . She will not require outpatient neurological testing. Pending for discharge: IV Steroid    ASSESSMENT & PLAN     * Multiple sclerosis [facility])  Assessment & Plan  Assessment:  Vickie Valdez is a 29 y.o. female with a PMHx of MS who presented on 12/5 with 1-week on and off symptoms of vertigo, particularly at night. Initial symptoms were visual symptoms including diplopia. These symptoms were sudden in onset over week and lasted 7 days. Symptoms did fully resolve. Over the past month, she has also developed new symptoms of urinary tract symptoms including urge incontinence. Previous evaluation has included MRI of brain, CSF analysis, and Lyme antibody. MRI brain in 2019 showed multiple foci of T2 hyperintensity identified in the supratentorial white matter compatible with MS disease. Lyme negative at the time. MRI MS brain w/wo contrast, 11/30/23: Multiple periventricular, subcortical, and pericallosal white matter demyelinating lesions consistent with the patient's history of multiple sclerosis, moderate plaque burden. New enhancing lesions within the right frontoparietal, left temporal, right periatrial, and right centrum semiovale white matter as detailed compatible with acute demyelination compared to the prior outside 1/22/2021 examination. The larger lesions demonstrate surrounding vasogenic edema. There are additional new subcentimeter lesions without enhancement including a right pontine focus. Negative urine pregnancy. Neg U/A for infection. TSH WNL. MRI C, T, and L spine 12/6/23: Shows no evidence of spinal cord demyelination. Single small T9-T10 disc herniation identified causing minimal central canal narrowing.  Degenerative disc disease from L3-L4 through L5-S1 with small disc herniations and minimal to mild stenosis. Prior treatments include: glatiramer acetate (COPAXONE) - did not tolerate 2/2 infection while on it     Impression: Clinical presentation and neuroimaging concerning for MS flare. Day 4 out of 5 of IV steroids. Patient back to baseline. With completion of steroids tomorrow, plan for discharge home. Plan:  Discussed with attending, Dr. Paola Bowie  Solu-Medrol 1g IV daily for 5 days - Day 4/5  Accuchecks and SSI TID AC as needed  Continue Vit D3 supplementation  Plan on d/c tomorrow AM  Patient will need to follow up with OP multiple sclerosis attending within 4 weeks or sooner if possible. Communicating with outpatient team to schedule appointment    Leukocytosis  Assessment & Plan  WBCs elevated most likely in the setting of IV solumedrol. 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[facility])  Assessment & Plan  - Noted on thrombosis panel 9/2022 at [facility] that was obtained due to a family history of clotting disorders  - Not on AC PTA  - Pt has had a reaction to lovenox PTA, will hold off on pharmacological dvt prophylaxis and continue with mechanical prophylaxis and encourage frequent ambulation if safe    PCOS (polycystic ovarian syndrome)  Assessment & Plan  - History of PCOS  - Pt maintained on Jencycla (Norethisterone) 0.35mg daily PTA - This has been Ok'd for use by OB-GYN in the setting of her ATIII deficiency    Recurrent major depressive disorder, in full remission ([address])  Assessment & Plan  - Pt maintained on duloxetine 30mg qd PTA, continue  - Ativan PRN for breakthrough anxiety, pt reports she is very anxious about her MS flare        SUBJECTIVE     Patient is a 29year old female who presented to  ED with a MS flare. Today, patient states that she had a had a bowel movement last night. Patient denies any sensory changes, numbness, weakness, or headache. Patient does not have any other questions or concerns at this time.   Patient was seen and examined today. No acute events overnight. Review of Systems   Gastrointestinal:  Negative for constipation. Neurological:  Negative for dizziness, tremors, seizures, syncope, facial asymmetry, speech difficulty, weakness, light-headedness, numbness and headaches. Psychiatric/Behavioral:  Negative for sleep disturbance. All other systems reviewed and are negative. OBJECTIVE     Patient ID: Jm Pollock is a 29 y.o. female. Vitals:    23 0558 23 1501 23 2202 23 0808   BP: 112/67 122/68 133/86 132/80   Pulse: 77 80 69 74   Resp: 18 17 17 18   Temp: 97.8 °F (36.6 °C) 98.1 °F (36.7 °C) 98 °F (36.7 °C) 98 °F (36.7 °C)   TempSrc:       SpO2: 97% 98% 94% 94%   Weight:       Height:          Temperature:   Temp (24hrs), Av °F (36.7 °C), Min:98 °F (36.7 °C), Max:98.1 °F (36.7 °C)    Temperature: 98 °F (36.7 °C)      Physical Exam  Vitals reviewed. Constitutional:       Appearance: Normal appearance. HENT:      Head: Normocephalic and atraumatic. Nose: Nose normal.      Mouth/Throat:      Mouth: Mucous membranes are moist.      Pharynx: Oropharynx is clear. Eyes:      Extraocular Movements: Extraocular movements intact and EOM normal.      Pupils: Pupils are equal, round, and reactive to light. Cardiovascular:      Rate and Rhythm: Normal rate and regular rhythm. Pulmonary:      Effort: Pulmonary effort is normal. No respiratory distress. Breath sounds: Normal breath sounds. No wheezing. Abdominal:      General: Abdomen is flat. Bowel sounds are normal. There is no distension. Palpations: Abdomen is soft. Tenderness: There is no abdominal tenderness. Skin:     General: Skin is warm. Neurological:      Mental Status: She is alert and oriented to person, place, and time. Cranial Nerves: Cranial nerves 2-12 are intact.       Motor: Motor strength is normal.     Coordination: Finger-Nose-Finger Test and Heel to Reeder Test normal.      Deep Tendon Reflexes:      Reflex Scores:       Tricep reflexes are 2+ on the right side and 2+ on the left side. Bicep reflexes are 2+ on the right side and 2+ on the left side. Brachioradialis reflexes are 2+ on the right side and 2+ on the left side. Patellar reflexes are 3+ on the right side and 3+ on the left side. Achilles reflexes are 3+ on the right side and 3+ on the left side. Psychiatric:         Speech: Speech normal.          Neurologic Exam     Mental Status   Oriented to person, place, and time. Attention: normal. Concentration: normal.   Speech: speech is normal   Level of consciousness: alert  Knowledge: good. Cranial Nerves   Cranial nerves II through XII intact. CN II   Visual fields full to confrontation. CN III, IV, VI   Pupils are equal, round, and reactive to light. Extraocular motions are normal.   Right pupil: Consensual response: intact. Left pupil: Consensual response: intact. Nystagmus: none     CN V   Facial sensation intact. CN VII   Facial expression full, symmetric. CN VIII   CN VIII normal.     CN IX, X   CN IX normal.   CN X normal.     CN XI   CN XI normal.     CN XII   CN XII normal.     Motor Exam   Muscle bulk: normal  Overall muscle tone: normal    Strength   Strength 5/5 throughout. Sensory Exam   Pinprick normal.     Gait, Coordination, and Reflexes     Coordination   Finger to nose coordination: normal  Heel to shin coordination: normal    Reflexes   Right brachioradialis: 2+  Left brachioradialis: 2+  Right biceps: 2+  Left biceps: 2+  Right triceps: 2+  Left triceps: 2+  Right patellar: 3+  Left patellar: 3+  Right achilles: 3+  Left achilles: 3+          LABORATORY DATA     Labs: I have personally reviewed pertinent reports.     Results from last 7 days   Lab Units 12/06/23  0524 12/05/23  1037   WBC Thousand/uL 18.48* 5.67   HEMOGLOBIN g/dL 15.4 15.6*   HEMATOCRIT % 45.3 44.0   PLATELETS Thousands/uL 239 225   NEUTROS PCT %  --  55   MONOS PCT %  --  9   EOS PCT %  --  3      Results from last 7 days   Lab Units 12/06/23  0524 12/05/23  1037   SODIUM mmol/L 137 140   POTASSIUM mmol/L 4.3 3.7   CHLORIDE mmol/L 107 109*   CO2 mmol/L 26 25   BUN mg/dL 13 12   CREATININE mg/dL 0.78 0.77   CALCIUM mg/dL 9.3 8.7   ALK PHOS U/L  --  49   ALT U/L  --  13   AST U/L  --  14     Results from last 7 days   Lab Units 12/06/23  0524 12/05/23  1037   MAGNESIUM mg/dL 2.2 1.8*     Results from last 7 days   Lab Units 12/05/23  1037   PHOSPHORUS mg/dL 3.0                    IMAGING & DIAGNOSTIC TESTING     Radiology Results: I have personally reviewed pertinent reports. and I have personally reviewed pertinent films in PACS    MRI cervical spine w wo contrast   Final Result by Svitlana Hicks MD (12/06 1894)      No evidence of cervical spinal cord demyelination. No stenosis. Workstation performed: [id number]         MRI thoracic spine w wo contrast   Final Result by Svitlana Hicks MD (12/06 2023)      No evidence of thoracic cord demyelination. Single small T9-T10 disc herniation causing minimal central canal narrowing. Workstation performed: [id number]         MRI lumbar spine w wo contrast   Final Result by Svitlana Hicks MD (50/26 1258)      No evidence of conus demyelination. Degenerative disc disease from L3-L4 through L5-S1 with small disc herniations and minimal to mild stenosis. Details above. Workstation performed: [id number]             Other Diagnostic Testing: I have personally reviewed pertinent reports.       ACTIVE MEDICATIONS     Current Facility-Administered Medications   Medication Dose Route Frequency    acetaminophen (TYLENOL) tablet 650 mg  650 mg Oral Q6H PRN    bisacodyl (DULCOLAX) rectal suppository 10 mg  10 mg Rectal Daily PRN    cholecalciferol (VITAMIN D3) tablet 400 Units  400 Units Oral Daily    DULoxetine (CYMBALTA) delayed release capsule 30 mg  30 mg Oral Daily    insulin lispro (HumaLOG) 100 units/mL subcutaneous injection 1-5 Units  1-5 Units Subcutaneous TID AC    LORazepam (ATIVAN) tablet 0.5 mg  0.5 mg Oral Q12H PRN    melatonin tablet 3 mg  3 mg Oral HS    methylPREDNISolone sodium succinate (Solu-MEDROL) 1,000 mg in sodium chloride 0.9 % 250 mL IVPB  1,000 mg Intravenous Daily    multivitamin stress formula tablet 1 tablet  1 tablet Oral Daily    nicotine (NICODERM CQ) 21 mg/24 hr TD 24 hr patch 1 patch  1 patch Transdermal Daily    norethindrone (MICRONOR) tablet 0.35 mg  1 tablet Oral Daily    ondansetron (ZOFRAN) injection 4 mg  4 mg Intravenous Q6H PRN    pantoprazole (PROTONIX) EC tablet 40 mg  40 mg Oral Early Morning    polyethylene glycol (MIRALAX) packet 17 g  17 g Oral Daily    saccharomyces boulardii (FLORASTOR) capsule 250 mg  250 mg Oral BID       Prior to Admission medications    Medication Sig Start Date End Date Taking?  Authorizing Provider   ALPRAZolam [place]) 0.5 mg tablet Take 30 minutes prior to MRI may repeat x1 no driving if taking 74/72/89   WINSOME Romero   cholecalciferol (VITAMIN D3) 400 units tablet Take 400 Units by mouth daily    Historical Provider, MD   DULoxetine (CYMBALTA) 30 mg delayed release capsule Take 1 capsule (30 mg total) by mouth daily 9/28/23 9/27/24  WINSOME Romero   Jencycla 0.35 MG tablet take 1 tablet by mouth once daily 8/28/23   Jihan Lu MD   KRILL OIL PO Take by mouth    Historical Provider, MD   Multiple Vitamin (MULTI VITAMIN DAILY PO) Take by mouth    Historical Provider, MD   Probiotic Product (PROBIOTIC-10 PO) Take by mouth    Historical Provider, MD   Turmeric 500 MG CAPS Take by mouth    Historical Provider, MD         VTE Pharmacologic Prophylaxis: Pharmacologic VTE Prophylaxis contraindicated due to reaction to Lovenox  VTE Mechanical Prophylaxis: Frequent ambulation    ======    I have discussed the patient's history, physical exam findings, assessment, and plan in detail with attending, Dr. Oneida Flores. Thank you for allowing me to participate in the care of your patient, Alexandira Burger.     [address] [facility], PGY2

## 2023-12-08 NOTE — ASSESSMENT & PLAN NOTE
- Noted on thrombosis panel 9/2022 at [facility] that was obtained due to a family history of clotting disorders  - Not on AC PTA  - Pt has had a reaction to lovenox PTA, will hold off on pharmacological dvt prophylaxis and continue with mechanical prophylaxis and encourage frequent ambulation if safe

## 2023-12-08 NOTE — DISCHARGE INSTR - AVS FIRST PAGE
Dear Frankie Briseno,     It was our pleasure to care for you here at [facility], [facility], [place]. It is our hope that we were always able to exceed the expected standards for your care during your stay. You were hospitalized due to a MS flare. For follow up as well as any medication refills, we recommend that you follow up with your primary care physician. However, at this time we provide for you here, the most important instructions / recommendations at discharge:     Notable Medication Adjustments -   None   Continue all medications as prior to admission  Testing Required after Discharge -   None  Important follow up information -   Primary Care Provider  Neurology MS specialist  Other Instructions -   None  Please review this entire after visit summary as additional general instructions including medication list, appointments, activity, diet, any pertinent wound care, and other additional recommendations from your care team that may be provided for you.       Sincerely,     Christy Bolanos, DO

## 2023-12-08 NOTE — PLAN OF CARE
Problem: PAIN - ADULT  Goal: Verbalizes/displays adequate comfort level or baseline comfort level  Description: Interventions:  - Encourage patient to monitor pain and request assistance  - Assess pain using appropriate pain scale  - Administer analgesics based on type and severity of pain and evaluate response  - Implement non-pharmacological measures as appropriate and evaluate response  - Consider cultural and social influences on pain and pain management  - Notify physician/advanced practitioner if interventions unsuccessful or patient reports new pain  Outcome: Progressing     Problem: INFECTION - ADULT  Goal: Absence or prevention of progression during hospitalization  Description: INTERVENTIONS:  - Assess and monitor for signs and symptoms of infection  - Monitor lab/diagnostic results  - Monitor all insertion sites, i.e. indwelling lines, tubes, and drains  - Monitor endotracheal if appropriate and nasal secretions for changes in amount and color  - [place] appropriate cooling/warming therapies per order  - Administer medications as ordered  - Instruct and encourage patient and family to use good hand hygiene technique  - Identify and instruct in appropriate isolation precautions for identified infection/condition  Outcome: Progressing     Problem: SAFETY ADULT  Goal: Patient will remain free of falls  Description: INTERVENTIONS:  - Educate patient/family on patient safety including physical limitations  - Instruct patient to call for assistance with activity   - Consult OT/PT to assist with strengthening/mobility   - Keep Call bell within reach  - Keep bed low and locked with side rails adjusted as appropriate  - Keep care items and personal belongings within reach  - Initiate and maintain comfort rounds    Outcome: Progressing     Problem: DISCHARGE PLANNING  Goal: Discharge to home or other facility with appropriate resources  Description: INTERVENTIONS:  - Identify barriers to discharge w/patient and caregiver  - Arrange for needed discharge resources and transportation as appropriate  - Identify discharge learning needs (meds, wound care, etc.)  - Arrange for interpretive services to assist at discharge as needed  - Refer to Case Management Department for coordinating discharge planning if the patient needs post-hospital services based on physician/advanced practitioner order or complex needs related to functional status, cognitive ability, or social support system  Outcome: Progressing

## 2023-12-08 NOTE — ASSESSMENT & PLAN NOTE
Assessment:  Mahesh Nguyen is a 29 y.o. female with a PMHx of MS who presented on 12/5 with 1-week on and off symptoms of vertigo, particularly at night. Initial symptoms were visual symptoms including diplopia. These symptoms were sudden in onset over week and lasted 7 days. Symptoms did fully resolve. Over the past month, she has also developed new symptoms of urinary tract symptoms including urge incontinence. Previous evaluation has included MRI of brain, CSF analysis, and Lyme antibody. MRI brain in 2019 showed multiple foci of T2 hyperintensity identified in the supratentorial white matter compatible with MS disease. Lyme negative at the time. MRI MS brain w/wo contrast, 11/30/23: Multiple periventricular, subcortical, and pericallosal white matter demyelinating lesions consistent with the patient's history of multiple sclerosis, moderate plaque burden. New enhancing lesions within the right frontoparietal, left temporal, right periatrial, and right centrum semiovale white matter as detailed compatible with acute demyelination compared to the prior outside 1/22/2021 examination. The larger lesions demonstrate surrounding vasogenic edema. There are additional new subcentimeter lesions without enhancement including a right pontine focus. Negative urine pregnancy. Neg U/A for infection. TSH WNL. MRI C, T, and L spine 12/6/23: Shows no evidence of spinal cord demyelination. Single small T9-T10 disc herniation identified causing minimal central canal narrowing. Degenerative disc disease from L3-L4 through L5-S1 with small disc herniations and minimal to mild stenosis. Prior treatments include: glatiramer acetate (COPAXONE) - did not tolerate 2/2 infection while on it     Impression: Clinical presentation and neuroimaging concerning for MS flare. Completed 5 day course of IV steroids. Patient back to baseline. Neurological symptoms have resolved, including urinary incontinence.     Plan:  Discussed with attending, Dr. Candido Willis  Solu-Medrol 1g IV daily for 5 days completed   Accuchecks and SSI TID AC as needed - d/c'ed upon discharge  Patient will need to follow up with OP multiple sclerosis attending within 4 weeks or sooner if possible.   Communicated with outpatient team to schedule appointment

## 2023-12-09 LAB
GLUCOSE SERPL-MCNC: 102 MG/DL (ref 65–140)
GLUCOSE SERPL-MCNC: 103 MG/DL (ref 65–140)
GLUCOSE SERPL-MCNC: 128 MG/DL (ref 65–140)
GLUCOSE SERPL-MCNC: 129 MG/DL (ref 65–140)
GLUCOSE SERPL-MCNC: 163 MG/DL (ref 65–140)

## 2023-12-09 PROCEDURE — 99232 SBSQ HOSP IP/OBS MODERATE 35: CPT | Performed by: PSYCHIATRY & NEUROLOGY

## 2023-12-09 PROCEDURE — 82948 REAGENT STRIP/BLOOD GLUCOSE: CPT

## 2023-12-09 RX ADMIN — PANTOPRAZOLE SODIUM 40 MG: 40 TABLET, DELAYED RELEASE ORAL at 06:13

## 2023-12-09 RX ADMIN — POLYETHYLENE GLYCOL 3350 17 G: 17 POWDER, FOR SOLUTION ORAL at 08:07

## 2023-12-09 RX ADMIN — CHOLECALCIFEROL TAB 10 MCG (400 UNIT) 400 UNITS: 10 TAB at 08:07

## 2023-12-09 RX ADMIN — MELATONIN TAB 3 MG 3 MG: 3 TAB at 21:52

## 2023-12-09 RX ADMIN — Medication 250 MG: at 17:07

## 2023-12-09 RX ADMIN — DULOXETINE HYDROCHLORIDE 30 MG: 30 CAPSULE, DELAYED RELEASE ORAL at 08:07

## 2023-12-09 RX ADMIN — LORAZEPAM 0.5 MG: 0.5 TABLET ORAL at 09:32

## 2023-12-09 RX ADMIN — SODIUM CHLORIDE 1000 MG: 0.9 INJECTION, SOLUTION INTRAVENOUS at 09:32

## 2023-12-09 RX ADMIN — Medication 250 MG: at 08:07

## 2023-12-09 RX ADMIN — INSULIN LISPRO 1 UNITS: 100 INJECTION, SOLUTION INTRAVENOUS; SUBCUTANEOUS at 17:07

## 2023-12-09 RX ADMIN — B-COMPLEX W/ C & FOLIC ACID TAB 1 TABLET: TAB at 08:07

## 2023-12-09 NOTE — PROGRESS NOTES
NEUROLOGY RESIDENCY PROGRESS NOTE     Name: Leila Jack   Age & Sex: 29 y.o. female   MRN: [id number]  Unit/Bed#: [facility] 612-01   Encounter: [id number]    Leila Jack will need follow up in 6 weeks with multiple sclerosis attending. She will not require outpatient neurological testing. Pending for discharge: IV Steroid, Clinical Improvement    ASSESSMENT & PLAN     * Multiple sclerosis [facility])  Assessment & Plan  Assessment:  Leila Jack is a 29 y.o. female with a PMHx of MS who presented on 12/5 with 1-week on and off symptoms of vertigo, particularly at night. Initial symptoms were visual symptoms including diplopia. These symptoms were sudden in onset over week and lasted 7 days. Symptoms did fully resolve. Over the past month, she has also developed new symptoms of urinary tract symptoms including urge incontinence. Previous evaluation has included MRI of brain, CSF analysis, and Lyme antibody. MRI brain in 2019 showed multiple foci of T2 hyperintensity identified in the supratentorial white matter compatible with MS disease. Lyme negative at the time. MRI MS brain w/wo contrast, 11/30/23: Multiple periventricular, subcortical, and pericallosal white matter demyelinating lesions consistent with the patient's history of multiple sclerosis, moderate plaque burden. New enhancing lesions within the right frontoparietal, left temporal, right periatrial, and right centrum semiovale white matter as detailed compatible with acute demyelination compared to the prior outside 1/22/2021 examination. The larger lesions demonstrate surrounding vasogenic edema. There are additional new subcentimeter lesions without enhancement including a right pontine focus. Negative urine pregnancy. Neg U/A for infection. TSH WNL. MRI C, T, and L spine 12/6/23: Shows no evidence of spinal cord demyelination. Single small T9-T10 disc herniation identified causing minimal central canal narrowing.  Degenerative disc disease from L3-L4 through L5-S1 with small disc herniations and minimal to mild stenosis. Prior treatments include: glatiramer acetate (COPAXONE) - did not tolerate 2/2 infection while on it     Impression: Clinical presentation and neuroimaging concerning for MS flare. Day 4 out of 5 of IV steroids. Patient back to baseline. With completion of steroids today, plan for discharge home tomorrow. Plan:  Discussed with attending, Dr. Lissy Mallory  Solu-Medrol 1g IV daily for 5 days completed on 12/9/23  Accuchecks and SSI TID [facility] as needed  D/c home tomorrow  Patient will need to follow up with OP multiple sclerosis attending within 4 weeks or sooner if possible. Communicating with outpatient team to schedule appointment    Leukocytosis  Assessment & Plan  WBCs elevated most likely in the setting of IV solumedrol. 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[facility])  Assessment & Plan  - Noted on thrombosis panel 9/2022 at [facility] that was obtained due to a family history of clotting disorders  - Not on AC PTA  - Pt has had a reaction to lovenox PTA, will hold off on pharmacological dvt prophylaxis and continue with mechanical prophylaxis and encourage frequent ambulation if safe    PCOS (polycystic ovarian syndrome)  Assessment & Plan  - History of PCOS  - Pt maintained on Jencycla (Norethisterone) 0.35mg daily PTA - This has been Ok'd for use by OB-GYN in the setting of her ATIII deficiency    Recurrent major depressive disorder, in full remission ([address])  Assessment & Plan  - Pt maintained on duloxetine 30mg qd PTA, continue  - Ativan PRN for breakthrough anxiety, pt reports she is very anxious about her MS flare        SUBJECTIVE     Patient is a 29year old female who presented to  ED with a MS flare. Today, patient states that she had a had a bowel movement this morning again and she feels good about it. Patient was seen and examined today. No acute events overnight.       Review of Systems   Gastrointestinal:  Negative for constipation. Neurological:  Negative for dizziness, tremors, seizures, syncope, facial asymmetry, speech difficulty, weakness, light-headedness, numbness and headaches. Psychiatric/Behavioral:  Negative for sleep disturbance. All other systems reviewed and are negative. OBJECTIVE     Patient ID: Madhu Lovett is a 29 y.o. female. Vitals:    23 1406 23 2130 23 2222 23 0907   BP: 121/69  137/79 129/73   Pulse:   62 86   Resp: 16  16    Temp: 97.9 °F (36.6 °C)  97.6 °F (36.4 °C) 97.8 °F (36.6 °C)   TempSrc:       SpO2:  97% 96% 99%   Weight:       Height:          Temperature:   Temp (24hrs), Av.8 °F (36.6 °C), Min:97.6 °F (36.4 °C), Max:97.9 °F (36.6 °C)    Temperature: 97.8 °F (36.6 °C)      Physical Exam  Vitals reviewed. Constitutional:       Appearance: Normal appearance. HENT:      Head: Normocephalic and atraumatic. Nose: Nose normal.      Mouth/Throat:      Mouth: Mucous membranes are moist.      Pharynx: Oropharynx is clear. Eyes:      Extraocular Movements: Extraocular movements intact and EOM normal.      Pupils: Pupils are equal, round, and reactive to light. Cardiovascular:      Rate and Rhythm: Normal rate and regular rhythm. Pulmonary:      Effort: Pulmonary effort is normal. No respiratory distress. Breath sounds: Normal breath sounds. No wheezing. Abdominal:      General: Abdomen is flat. Bowel sounds are normal. There is no distension. Palpations: Abdomen is soft. Tenderness: There is no abdominal tenderness. Skin:     General: Skin is warm. Neurological:      Mental Status: She is alert and oriented to person, place, and time. Cranial Nerves: Cranial nerves 2-12 are intact. Motor: Motor strength is normal.     Coordination: Finger-Nose-Finger Test and Heel to [facility] Test normal.      Deep Tendon Reflexes:      Reflex Scores:       Tricep reflexes are 2+ on the right side and 2+ on the left side.        Bicep reflexes are 2+ on the right side and 2+ on the left side. Brachioradialis reflexes are 2+ on the right side and 2+ on the left side. Patellar reflexes are 3+ on the right side and 3+ on the left side. Achilles reflexes are 3+ on the right side and 3+ on the left side. Psychiatric:         Speech: Speech normal.          Neurologic Exam     Mental Status   Oriented to person, place, and time. Attention: normal. Concentration: normal.   Speech: speech is normal   Level of consciousness: alert  Knowledge: good. Cranial Nerves   Cranial nerves II through XII intact. CN II   Visual fields full to confrontation. CN III, IV, VI   Pupils are equal, round, and reactive to light. Extraocular motions are normal.   Right pupil: Consensual response: intact. Left pupil: Consensual response: intact. Nystagmus: none     CN V   Facial sensation intact. CN VII   Facial expression full, symmetric. CN VIII   CN VIII normal.     CN IX, X   CN IX normal.   CN X normal.     CN XI   CN XI normal.     CN XII   CN XII normal.     Motor Exam   Muscle bulk: normal  Overall muscle tone: normal    Strength   Strength 5/5 throughout. Sensory Exam   Pinprick normal.     Gait, Coordination, and Reflexes     Coordination   Finger to nose coordination: normal  Heel to shin coordination: normal    Reflexes   Right brachioradialis: 2+  Left brachioradialis: 2+  Right biceps: 2+  Left biceps: 2+  Right triceps: 2+  Left triceps: 2+  Right patellar: 3+  Left patellar: 3+  Right achilles: 3+  Left achilles: 3+          LABORATORY DATA     Labs: I have personally reviewed pertinent reports.     Results from last 7 days   Lab Units 12/08/23  0816 12/06/23  0524 12/05/23  1037   WBC Thousand/uL 16.66* 18.48* 5.67   HEMOGLOBIN g/dL 15.0 15.4 15.6*   HEMATOCRIT % 44.6 45.3 44.0   PLATELETS Thousands/uL 235 239 225   NEUTROS PCT % 83*  --  55   MONOS PCT % 6  --  9   EOS PCT % 0  --  3      Results from last 7 days   Lab Units 12/06/23  0524 12/05/23  1037   SODIUM mmol/L 137 140   POTASSIUM mmol/L 4.3 3.7   CHLORIDE mmol/L 107 109*   CO2 mmol/L 26 25   BUN mg/dL 13 12   CREATININE mg/dL 0.78 0.77   CALCIUM mg/dL 9.3 8.7   ALK PHOS U/L  --  49   ALT U/L  --  13   AST U/L  --  14     Results from last 7 days   Lab Units 12/06/23  0524 12/05/23  1037   MAGNESIUM mg/dL 2.2 1.8*     Results from last 7 days   Lab Units 12/05/23  1037   PHOSPHORUS mg/dL 3.0                    IMAGING & DIAGNOSTIC TESTING     Radiology Results: I have personally reviewed pertinent reports. and I have personally reviewed pertinent films in PACS    MRI cervical spine w wo contrast   Final Result by Alina Song MD (12/06 8478)      No evidence of cervical spinal cord demyelination. No stenosis. Workstation performed: [id number]         MRI thoracic spine w wo contrast   Final Result by Alina Song MD (12/06 8399)      No evidence of thoracic cord demyelination. Single small T9-T10 disc herniation causing minimal central canal narrowing. Workstation performed: [id number]         MRI lumbar spine w wo contrast   Final Result by Alina Song MD (03/92 2677)      No evidence of conus demyelination. Degenerative disc disease from L3-L4 through L5-S1 with small disc herniations and minimal to mild stenosis. Details above. Workstation performed: [id number]             Other Diagnostic Testing: I have personally reviewed pertinent reports.       ACTIVE MEDICATIONS     Current Facility-Administered Medications   Medication Dose Route Frequency    acetaminophen (TYLENOL) tablet 650 mg  650 mg Oral Q6H PRN    bisacodyl (DULCOLAX) rectal suppository 10 mg  10 mg Rectal Daily PRN    cholecalciferol (VITAMIN D3) tablet 400 Units  400 Units Oral Daily    DULoxetine (CYMBALTA) delayed release capsule 30 mg  30 mg Oral Daily    insulin lispro (HumaLOG) 100 units/mL subcutaneous injection 1-5 Units  1-5 Units Subcutaneous TID AC    melatonin tablet 3 mg  3 mg Oral HS    multivitamin stress formula tablet 1 tablet  1 tablet Oral Daily    nicotine (NICODERM CQ) 21 mg/24 hr TD 24 hr patch 1 patch  1 patch Transdermal Daily    ondansetron (ZOFRAN) injection 4 mg  4 mg Intravenous Q6H PRN    pantoprazole (PROTONIX) EC tablet 40 mg  40 mg Oral Early Morning    polyethylene glycol (MIRALAX) packet 17 g  17 g Oral Daily    saccharomyces boulardii (FLORASTOR) capsule 250 mg  250 mg Oral BID       Prior to Admission medications    Medication Sig Start Date End Date Taking? Authorizing Provider   ALPRAZolam Erroll Player) 0.5 mg tablet Take 30 minutes prior to MRI may repeat x1 no driving if taking 56/24/23   Reyes Abu, CRNP   cholecalciferol (VITAMIN D3) 400 units tablet Take 400 Units by mouth daily    Historical Provider, MD   DULoxetine (CYMBALTA) 30 mg delayed release capsule Take 1 capsule (30 mg total) by mouth daily 9/28/23 9/27/24  Reyes Abu, CRNP   Jencycla 0.35 MG tablet take 1 tablet by mouth once daily 8/28/23   Nelsonfiabbi Axon, MD   KRILL OIL PO Take by mouth    Historical Provider, MD   Multiple Vitamin (MULTI VITAMIN DAILY PO) Take by mouth    Historical Provider, MD   Probiotic Product (PROBIOTIC-10 PO) Take by mouth    Historical Provider, MD   Turmeric 500 MG CAPS Take by mouth    Historical Provider, MD         VTE Pharmacologic Prophylaxis: Pharmacologic VTE Prophylaxis contraindicated due to reaction to Lovenox  VTE Mechanical Prophylaxis: Frequent ambulation    ======    I have discussed the patient's history, physical exam findings, assessment, and plan in detail with attending, Dr. Jeanna Abrams. Thank you for allowing me to participate in the care of your patient, Vickie Valdez.     [address] [facility] Neurology Residency, PGY2

## 2023-12-10 ENCOUNTER — DOCUMENTATION (OUTPATIENT)
Dept: OTHER | Facility: HOSPITAL | Age: 33
End: 2023-12-10

## 2023-12-10 VITALS
HEART RATE: 58 BPM | WEIGHT: 180 LBS | TEMPERATURE: 98.1 F | SYSTOLIC BLOOD PRESSURE: 130 MMHG | OXYGEN SATURATION: 97 % | DIASTOLIC BLOOD PRESSURE: 82 MMHG | RESPIRATION RATE: 18 BRPM | HEIGHT: 65 IN | BODY MASS INDEX: 29.99 KG/M2

## 2023-12-10 LAB
GLUCOSE SERPL-MCNC: 103 MG/DL (ref 65–140)
GLUCOSE SERPL-MCNC: 137 MG/DL (ref 65–140)

## 2023-12-10 PROCEDURE — 99238 HOSP IP/OBS DSCHRG MGMT 30/<: CPT | Performed by: PSYCHIATRY & NEUROLOGY

## 2023-12-10 PROCEDURE — 82948 REAGENT STRIP/BLOOD GLUCOSE: CPT

## 2023-12-10 RX ADMIN — B-COMPLEX W/ C & FOLIC ACID TAB 1 TABLET: TAB at 09:11

## 2023-12-10 RX ADMIN — DULOXETINE HYDROCHLORIDE 30 MG: 30 CAPSULE, DELAYED RELEASE ORAL at 09:11

## 2023-12-10 RX ADMIN — CHOLECALCIFEROL TAB 10 MCG (400 UNIT) 400 UNITS: 10 TAB at 09:11

## 2023-12-10 NOTE — PLAN OF CARE
Problem: PAIN - ADULT  Goal: Verbalizes/displays adequate comfort level or baseline comfort level  Description: Interventions:  - Encourage patient to monitor pain and request assistance  - Assess pain using appropriate pain scale  - Administer analgesics based on type and severity of pain and evaluate response  - Implement non-pharmacological measures as appropriate and evaluate response  - Consider cultural and social influences on pain and pain management  - Notify physician/advanced practitioner if interventions unsuccessful or patient reports new pain  Outcome: Adequate for Discharge     Problem: INFECTION - ADULT  Goal: Absence or prevention of progression during hospitalization  Description: INTERVENTIONS:  - Assess and monitor for signs and symptoms of infection  - Monitor lab/diagnostic results  - Monitor all insertion sites, i.e. indwelling lines, tubes, and drains  - Monitor endotracheal if appropriate and nasal secretions for changes in amount and color  - [place] appropriate cooling/warming therapies per order  - Administer medications as ordered  - Instruct and encourage patient and family to use good hand hygiene technique  - Identify and instruct in appropriate isolation precautions for identified infection/condition  Outcome: Adequate for Discharge     Problem: SAFETY ADULT  Goal: Patient will remain free of falls  Description: INTERVENTIONS:  - Educate patient/family on patient safety including physical limitations  - Instruct patient to call for assistance with activity   - Consult OT/PT to assist with strengthening/mobility   - Keep Call bell within reach  - Keep bed low and locked with side rails adjusted as appropriate  - Keep care items and personal belongings within reach  - Initiate and maintain comfort rounds    Outcome: Adequate for Discharge     Problem: DISCHARGE PLANNING  Goal: Discharge to home or other facility with appropriate resources  Description: INTERVENTIONS:  - Identify barriers to discharge w/patient and caregiver  - Arrange for needed discharge resources and transportation as appropriate  - Identify discharge learning needs (meds, wound care, etc.)  - Arrange for interpretive services to assist at discharge as needed  - Refer to Case Management Department for coordinating discharge planning if the patient needs post-hospital services based on physician/advanced practitioner order or complex needs related to functional status, cognitive ability, or social support system  Outcome: Adequate for Discharge

## 2023-12-11 ENCOUNTER — TRANSITIONAL CARE MANAGEMENT (OUTPATIENT)
Dept: FAMILY MEDICINE CLINIC | Facility: CLINIC | Age: 33
End: 2023-12-11

## 2023-12-11 NOTE — UTILIZATION REVIEW
NOTIFICATION OF ADMISSION DISCHARGE   This is a Notification of Discharge from [address] [facility]. Please be advised that this patient has been discharge from our facility. Below you will find the admission and discharge date and time including the patient’s disposition. UTILIZATION REVIEW CONTACT:  Stella Martinez  Utilization   Network Utilization Review Department  Phone: [phone] x carefully listen to the prompts. All voicemails are confidential.  Email: [email or web address]     ADMISSION INFORMATION  PRESENTATION DATE: 12/5/2023  8:46 AM  OBERVATION ADMISSION DATE:   INPATIENT ADMISSION DATE: 12/5/23  9:51 AM   DISCHARGE DATE: 12/10/2023 11:09 AM   DISPOSITION:Home/Self Care    Network Utilization Review Department  ATTENTION: Please call with any questions or concerns to [phone] and carefully listen to the prompts so that you are directed to the right person. All voicemails are confidential.   For Discharge needs, contact Care Management DC Support Team at [phone] opt. 2  Send all requests for admission clinical reviews, approved or denied determinations and any other requests to dedicated fax number below belonging to the campus where the patient is receiving treatment.  List of dedicated fax numbers for the Facilities:  [place] DENIALS (Administrative/Medical Necessity) [phone]   DISCHARGE SUPPORT TEAM (Network) [phone] [address] [facility] (Maternity/NICU/Pediatrics) [phone]   [address] [facility] 1000 [facility] 207 [facility] [address] [phone] [address] [facility] [phone]   [address] [place] [address]  [address]  [phone]

## 2023-12-12 ENCOUNTER — TELEPHONE (OUTPATIENT)
Dept: NEUROLOGY | Facility: CLINIC | Age: 33
End: 2023-12-12

## 2023-12-12 NOTE — TELEPHONE ENCOUNTER
I called and spoke with the patient to schedule a hfu with Dr. Conrad Rizvi. Patient accepted appt on 1/11/24 at 1:30 pm at the [facility] office. Address confirmed with the patient.

## 2024-01-11 ENCOUNTER — OFFICE VISIT (OUTPATIENT)
Dept: NEUROLOGY | Facility: CLINIC | Age: 34
End: 2024-01-11
Payer: COMMERCIAL

## 2024-01-11 VITALS
DIASTOLIC BLOOD PRESSURE: 88 MMHG | SYSTOLIC BLOOD PRESSURE: 128 MMHG | WEIGHT: 185 LBS | BODY MASS INDEX: 30.82 KG/M2 | HEIGHT: 65 IN | HEART RATE: 76 BPM

## 2024-01-11 DIAGNOSIS — G35 MULTIPLE SCLEROSIS (HCC): ICD-10-CM

## 2024-01-11 DIAGNOSIS — K59.00 CONSTIPATION, UNSPECIFIED CONSTIPATION TYPE: ICD-10-CM

## 2024-01-11 DIAGNOSIS — G35 RELAPSING REMITTING MULTIPLE SCLEROSIS (HCC): Primary | ICD-10-CM

## 2024-01-11 PROCEDURE — 99215 OFFICE O/P EST HI 40 MIN: CPT | Performed by: PSYCHIATRY & NEUROLOGY

## 2024-01-11 RX ORDER — FLUCONAZOLE 150 MG/1
TABLET ORAL
COMMUNITY
Start: 2023-11-16

## 2024-01-11 NOTE — PROGRESS NOTES
NEUROLOGY OUTPATIENT - NEW PATIENT VISIT NOTE        NAME: Susan Villanueva  MRN: [id number]  : 1990     TODAY'S DATE: 24         HISTORY OF PRESENT ILLNESS (HPI):    Ms. Villanueva is a 33 y.o. female presenting with Multiple Sclerosis    MS HISTORY:  Symptoms onset:   Initial symptoms: Left-sided facial numbness (Dr[facility]  MS diagnosis: 2019  Disease course at onset: Relapsing remitting Multiple Sclerosis   Current disease course: Relapsing remitting Multiple Sclerosis   Subsequent symptoms:    2023-dizziness/vertiginous symptoms s/p 5 days of IVMP.   Family history of MS: No  Spinal tap: positive--11 bands, 0 in the serum. (Positive) protein 40 and CSF, WBC count 2, RBC count 5, and CSF glucose was 64   Prior DMT's for MS: Copaxone (stopped secondary to the side effects)  Current DMT's for MS: none  JCV test: 0.15 negative   NMO Ab: [facility]  MOG Ab:not tested  Last MRI of the brain:  2023-new enhancing lesion noted.   Last MRI of the C spine: 2023-DDD, no lesions noted.   Last MRI of the T spine:2023-DDD, no lesions noted.     2023-presented to the ER with 1 week history of intermittent vertigo for which she underwent MRI of the brain which was showing enhancing lesions.  Previously she was diagnosed with multiple sclerosis when she presented with left-sided face numbness and was diagnosed based on the MRI findings and positive spinal tap.  She was initially put on Copaxone but had side effects.     MS ROS:   Sensory symptoms (numbness, tingling and paresthesia):  She does have some numbness in the left side of the lip, which comes and goes.   Weakness: No new symptoms reported   Spasms: No new symptoms reported   Vision problems (loss of vision or double vision): No new symptoms reported   Ambulatory dysfunction: No new symptoms reported   Vertigo and Dizziness: No new symptoms reported   Cognitive complaints: Improved since the  "steroids   Speech complaints: Improved since the steroids.    Depression: No new symptoms reported   Anxiety: No new symptoms reported   Fatigue: Much better since the hospital --no day time naps  Bladder symptoms: last 6 months, she is having increased urgency. 3-5 accidents.   Bowel symptoms: No new symptoms reported   Lhermitte's sign: ++   Uhthoff's phenomenon: ++   MS hugs like symptoms:  a few episodes in the past    CURRENT OUTPATIENT MEDICATIONS:    Susan Villanueva has a current medication list which includes the following prescription(s): alprazolam, cholecalciferol, duloxetine, jencycla, krill oil, multiple vitamin, probiotic product, and turmeric.       PHYSICAL EXAMINATION:   VITAL SIGNS:  height is 5' 5\" (1.651 m) and weight is 83.9 kg (185 lb). Her blood pressure is 128/88 and her pulse is 76.        NEUROLOGICAL EXAMINATION:    MENTAL STATUS EXAM: Alert, oriented to time place and person,     CRANIAL NERVE EXAMINATION:  I Not tested   II Normal visual fields to finger counting.   II, Ill,  IV, VI Pupils were symmetric, briskly reactive. No afferent pupillary defect.  Eye movements are full without nystagmus. No ptosis.   V Facial sensation were intact   VII Facial movements were symmetrical    VIII Hearing was intact   IX, X Intact   XI Shoulder shrug and head turn was normal   XII Tongue protrusion is in the mid line.          MOTOR EXAM:        Arm Right  Left Leg Right  Left   Deltoid 5/5 5/5 lliopsoas 5/5 5/5   Biceps 5/5 5/5 Quads 5/5 5/5   Triceps 5/5 5/5 Hamstrings 5/5 5/5   Wrist Extension 5/5 5/5 Ankle Dorsi Flexion 5/5 5/5   Wrist Flexion 5/5 5/5 Ankle Plantar Flexion 5/5 5/5               DEEP TENDON REFLEXES:     Brachioradialis Biceps Triceps Patellar Achilles   Right 2+ 3+ 3+ 3+ 3+   Left 3+ 2+ 3+ 3+ 3+            SENSORY EXAMINATION:   Sensation to dull touch Intact     COORDINATION:   normal finger to nose testing     GAIT/STATION:   normal        DIAGNOSTIC STUDIES:   PERTINENT " "LABS:     Lab Results   Component Value Date    WBC 16.66 (H) 12/08/2023     Lab Results   Component Value Date    HGB 15.0 12/08/2023     Lab Results   Component Value Date     12/08/2023     Lab Results   Component Value Date    LYMPHSABS 1.70 12/08/2023     No results found for: \"LABLYMP\"  Lab Results   Component Value Date    EOSABS 0.04 12/08/2023     No components found for: \"NEUTROPHILS\"    No results found for: \"LABMONO\"         No results found for: \"LABGLUC\"  Lab Results   Component Value Date    CREATININE 0.78 12/06/2023     Lab Results   Component Value Date    AST 14 12/05/2023     Lab Results   Component Value Date    ALT 13 12/05/2023     Lab Results   Component Value Date    ALKPHOS 49 12/05/2023     Lab Results   Component Value Date    AST 14 12/05/2023         Lab Results   Component Value Date    HEPCAB Non-reactive 06/11/2022            NEUROIMAGING:  Results for orders placed in visit on 12/04/23    MRI brain w wo contrast      MRI brain w wo contrast            Results for orders placed during the hospital encounter of 11/30/23    MRI brain MS wo and w contrast    Impression  Multiple periventricular, subcortical, and pericallosal white matter demyelinating lesions consistent with the patient's history of multiple sclerosis, moderate plaque burden. New enhancing lesions within the right frontoparietal, left temporal, right  periatrial, and right centrum semiovale white matter as detailed compatible with acute demyelination compared to the prior outside 1/22/2021 examination. The larger lesions demonstrate surrounding vasogenic edema. There are additional new subcentimeter  lesions without enhancement including a right pontine focus.    The study was marked in EPIC for immediate notification.        Workstation performed: [id number]       Results for orders placed during the hospital encounter of 12/05/23    MRI cervical spine w wo contrast    Impression  No evidence of cervical spinal " cord demyelination.    No stenosis.          Workstation performed: [id number]      Results for orders placed during the hospital encounter of 12/05/23    MRI thoracic spine w wo contrast    Impression  No evidence of thoracic cord demyelination.    Single small T9-T10 disc herniation causing minimal central canal narrowing.      Workstation performed: [id number]      She had MRI of the orbits and MRI of the brain with contrast. It shows enhancement in the optic nerves as well as enhancement also seen on right posterior frontal white matter area and they did notice hyperintensity signal involving prince and lesser extent to midbrain and pontomedullary junction. Please review official report for details.    She had MRI of the thoracic spine. It show a tiny focal area of probable demyelination involving the right anterolateral aspect of the thoracic cord. Please review official report for details.      MRI BRAIN W CONTRAST 10/2/2019  Narrative  MRI brain and orbits with and without contrast    History: Left-sided facial numbness. Episode of double vision.    Comparison: MRI brain/MRA head and neck    Technique: Multiplanar MRI of the brain with special attention to  the orbits was performed utilizing short and long TE sequences.  Scanning was performed prior to and following the administration of  8.5 cc of intravenous contrast material.    Report:    Demonstrated are periventricular/callosal septal interface focal  areas of T2 and FLAIR hyperintensity suspicious for multiple  sclerosis. Some of this abnormal signal involves the pontomedullary  junction, prince, and to a lesser extent the midbrain.    Cerebellar tonsils terminate above the foramen magnum. The  visualized intracranial, canalicular, and intraorbital portions of  the optic nerves are unremarkable in appearance. There is, however,  enhancement involving the distal infraorbital as well as within  portions of the optic nerves lesser extent findings are  suspicious  for demyelination. The optic chiasm itself is unremarkable with no  abnormal enhancement. It should be noted that there is curvilinear  enhancement involving a single posterior frontal white matter lesion  in the right centrum semiovale.    Flow voids are unremarkable. Dural venous sinuses are unremarkable.    There is minimal to moderate ethmoid air cell thickening. There is  minimal bilateral frontal sinus because of thickening.    Impression  Impression:    Multiple sclerosis with active demyelination including enhancement  of the optic nerves. Neuromyelitis spectrum disorder is less likely  in the absence of spinal cord findings.        ASSESSMENT AND PLAN:    Ms. Villanueva is a 33 y.o.female  presenting with [facility] care regarding her diagnosis of multiple sclerosis previously patient was diagnosed with multiple sclerosis based on left-sided facial numbness after undergoing an MRI of the brain which was showing lesions consistent with MS and positive spinal tap and meets the 2017 Pfeiffer's criteria for relapsing remitting MS. Patient  has a past medical history of Abnormal Pap smear of cervix (2015), Depression, Multiple sclerosis (HCC), Opioid abuse, in remission ([facility]), Polycystic ovary syndrome, and Substance abuse ([facility]).. Neurological exam is  concerning for brisk reflexes in the left lower extremity and bilateral upper extremity. Neuroimaging including MRI of the brain were consistent with MS.       Relapsing remitting multiple sclerosis  Active/nonprogressive phase of the disease.  I discussed pathophysiology, different medication classes along with their side effects.  I have given information about many different oral medications and Kesimpta.  She is going to read about the different medication and get back to us about her choice of medications.       MULTIPLE SCLEROSIS PLAN:      Disease Modifying Therapy:   Reading material was provided to Ms. Villanueva and she would let us know which DMT  she would like to take.   Safety labs:   Will be ordered based on which DMT she prefers.   Neuroimaging:   Once she starts the DMT, we can repeat the neuroimaging in 6 months to check for any sub clinical disease course.   Supplements:   Vit D and other supplements as recommended. Written instructions were given. Vit D goal is above 40.   Exercise:   Regular exercise was recommended (At least 150 min a week)  Diet:   Mediterranean diet ( instructions were given)    Constipation  GI referral was placed      there are no diagnoses linked to this encounter.        No follow-ups on file.       The management plan was discussed in detail with the patient and all questions were answered.     Ms. Villanueva was encouraged to contact our office with any questions or concerns and to contact the clinic or go to the nearest emergency room if symptoms change or worsen.       I have spent a total of 65 min in reviewing and/or ordering tests, medications, or procedures, performing an examination or evaluation, reviewing pertinent history, counseling and educating the patient, referring and/or communicating with other health care professionals, documenting in the EMR and general coordination of care of Ms. Villanueva  today.           Meet House MD.   Staff Neurologist,   Neuroimmunology and Neuroinfectious disease  01/11/24     This report has been created through the use of voice recognition/text compilation software.  Typographical and content errors may occur with this process.  While efforts are made to detect and correct such errors, in some cases errors will persist.  For this reason, wording in this document should be considered in the proper context and not strictly verbatim.  If, when reviewing the document, an error is discovered, please let the office know at [phone]

## 2024-01-11 NOTE — PATIENT INSTRUCTIONS
"Mediterranean diet -- There is no single definition of a Mediterranean diet, but such diets are typically high in fruits, vegetables, whole grains, beans, nuts, and seeds; include olive oil as an important source of monounsaturated fat; and allow low to moderate wine consumption. There are typically low to moderate amounts of fish, poultry, and dairy products, with little red meat. The Mediterranean diet is associated with several health benefits; however, it remains uncertain which components of the Mediterranean diet offer the protective benefit or if the benefits result from an aggregation of effects.    The Mediterranean diet incorporates the basics of healthy eating -- plus a splash of flavorful olive oil and perhaps a glass of red wine -- among other components characterizing the traditional cooking style of countries bordering the [place].  Most healthy diets include fruits, vegetables, fish and whole grains, and limit unhealthy fats. While these parts of a healthy diet are tried-and-true, subtle variations or differences in proportions of certain foods may make a difference in your risk of heart disease.     Benefits of the Mediterranean diet  Research has shown that the traditional Mediterranean diet reduces the risk of heart disease. The diet has been associated with a lower level of oxidized low-density lipoprotein (LDL) cholesterol -- the \"bad\" cholesterol that's more likely to build up deposits in your arteries.  In fact, a meta-analysis of more than 1.5 million healthy adults demonstrated that following a Mediterranean diet was associated with a reduced risk of cardiovascular mortality as well as overall mortality.  The Mediterranean diet is also associated with a reduced incidence of cancer, and Parkinson's and Alzheimer's diseases. Women who eat a Mediterranean diet supplemented with extra-virgin olive oil and mixed nuts may have a reduced risk of breast cancer.  For these reasons, most if " not all major scientific organizations encourage healthy adults to adapt a style of eating like that of the Mediterranean diet for prevention of major chronic diseases.     Key components of the Mediterranean diet  The Mediterranean diet emphasizes:  Eating primarily plant-based foods, such as fruits and vegetables, whole grains, legumes and nuts   Replacing butter with healthy fats such as olive oil and canola oil   Using herbs and spices instead of salt to flavor foods   Limiting red meat to no more than a few times a month   Eating fish and poultry at least twice a week   Enjoying meals with family and friends   Drinking red wine in moderation (optional)   Getting plenty of exercise      If you're looking for a heart-healthy eating plan, the Mediterranean diet might be right for you. The Mediterranean diet incorporates the basics of healthy eating -- plus a splash of flavorful olive oil and perhaps even a glass of red wine -- among other components characterizing the traditional cooking style of countries bordering the [place].  Most healthy diets include fruits, vegetables, fish and whole grains, and limit unhealthy fats. While these parts of a healthy diet remain tried-and-true, subtle variations or differences in proportions of certain foods may make a difference in your risk of heart disease.      Research has shown that the traditional Mediterranean diet reduces the risk of heart disease. In fact, an analysis of more than 1.5 million healthy adults demonstrated that following a Mediterranean diet was associated with a reduced risk of death from heart disease and cancer, as well as a reduced incidence of Parkinson's and Alzheimer's diseases.      The Dietary Guidelines for Americans recommends the Mediterranean diet as an eating plan that can help promote health and prevent disease. And the Mediterranean diet is one your whole family can follow for good health.   The Mediterranean diet  "emphasizes:  Eating primarily plant-based foods, such as fruits and vegetables, whole grains,  legumes and nuts  Replacing butter with healthy fats, such as olive oil  Using herbs and spices instead of salt to flavor foods  Limiting red meat to no more than a few times a month  Eating fish and poultry at least twice a week  Drinking red wine in moderation (optional)     The diet also recognizes the importance of being physically active, and enjoying meals with family and friends.     The Mediterranean diet traditionally includes fruits, vegetables and grains. For example, residents of [facility] average six or more servings a day of antioxidant rich fruits and vegetables.     Grains in the Mediterranean region are typically whole grain and usually contain very few unhealthy trans fats, and bread is an important part of the diet. However, throughout the Mediterranean region, bread is eaten plain or dipped in olive oil -- not eaten with butter or margarine, which contains saturated or trans fats.      Nuts are another part of a healthy Mediterranean diet. Nuts are high in fat, but most of the fat is healthy. Because nuts are high in calories, they should not be eaten in large amounts -- generally no more than a handful a day. For the best nutrition, avoid candied or honey-roasted and heavily salted nuts.  The focus of the Mediterranean diet isn't on limiting total fat consumption, but rather on choosing healthier types of fat. The Mediterranean diet discourages saturated fats and hydrogenated oils (trans fats), both of which contribute to heart disease.  The Mediterranean diet features olive oil as the primary source of fat. Olive oil is mainly monounsaturated fat -- a type of fat that can help reduce low-density lipoprotein (LDL) cholesterol levels when used in place of saturated or trans fats. \"Extra-virgin\" and \"virgin\" olive oils (the least processed forms) also contain the highest levels of protective plant compounds " that provide antioxidant effects.  Canola oil and some nuts contain the beneficial linolenic acid (a type of omega-3 fatty acid) in addition to healthy unsaturated fat. Omega-3 fatty acids lower triglycerides, decrease blood clotting, and are associated with decreased incidence of sudden heart attacks, improve the health of your blood vessels, and help moderate blood pressure.      Fatty fish -- such as mackerel, lake trout, herring, sardines, albacore tuna and salmon -- are rich sources of omega-3 fatty acids. Fish is eaten on a regular basis in the Mediterranean diet.     The health effects of alcohol have been debated for many years, and some doctors are reluctant to encourage alcohol consumption because of the health consequences of excessive drinking. However, alcohol -- in moderation -- has been associated with a reduced risk of heart disease in some research studies.  The Mediterranean diet typically includes a moderate amount of wine, usually red wine. This means no more than 5 ounces (148 milliliters) of wine daily for women of all ages and men older than age 65 and no more than 10 ounces (296 milliliters) of wine daily for younger men. More than this may increase the risk of health problems, including increased risk of certain types of cancer.   If you're unable to limit your alcohol intake to the amounts defined above, if you have a personal or family history of alcohol abuse, or if you have heart or liver disease, refrain from drinking wine or any other alcohol.     The Mediterranean diet is a delicious and healthy way to eat. Many people who switch to this style of eating say they'll never eat any other way. Here are some specific steps to get you started:   Eat your veggies and fruits -- and switch to whole grains. Avariety of plant foods should make up the majority of your meals. They should be minimally processed -- fresh and whole are best. Include veggies and fruits in every meal and eat them for  snacks as well. Switch to whole-grain bread and cereal, and begin to eat more whole-grain rice and pasta products. Keep baby carrots, apples and bananas on hand for quick, satisfying snacks. Fruit salads are a wonderful way to eat a variety of healthy fruit.  Go nuts. Nuts and seeds are good sources of fiber, protein and healthy fats. Keep almonds, cashews, pistachios and walnuts on hand for a quick snack. Choose natural peanut butter, rather than the kind with hydrogenated fat added. Try blended sesame seeds (tahini) as a dip or spread for bread.  Pass on the butter. Try olive or canola oil as a healthy replacement for butter or margarine. Lightly drizzle it over vegetables. After cooking pasta, add a touch of olive oil, some garlic and green onions for flavoring. Dip bread in flavored olive oil or lightly spread it on whole-grain bread for a tasty alternative to butter. Try tahini as a dip or spread for bread too.  Spice it up. Herbs and spices make food tasty and can  for salt and fat in recipes.  Go fish. Eat fish at least twice a week. Fresh or water-packed tuna, salmon, trout, mackerel and herring are healthy choices. Oxly, bake or broil fish for great taste and easy cleanup. Avoid breaded and fried fish.  Rein in the red meat. Limit red meat to no more than a few times a month. Substitute fish and poultry for red meat. When choosing red meat, make sure it's lean and keep portions small (about the size of a deck of cards). Also avoid sausage, cristina and other high-fat, processed meats.  Choose low-fat dairy. Limit higher fat dairy products, such as whole or 2 percent milk, cheese and ice cream. Switch to skim milk, fat-free yogurt and low-fat cheese. Many patients ask us about exercise and MS. Some common questions include: “should I exercise?”, “how often, for how long, how strenuous?”, and “what should I do?”. The answers are different for everybody however for the most part, exercise is good for  people with MS.    A 2010 study in [place] showed that fatigue, mood and quality of life improved after 12 weeks of progressive resistance training. This benefit was maintained for an additional 12 weeks after the end of their training. [1]    Another study at [facility] in 2004 randomized MS patients into 3 groups: one that did Vanessa yoga, one that worked out on a stationary bike, or a control group. The yoga group showed a significant improvement in fatigue compared to controls. [2]    Everybody is different so the type of exercise you do, how often you do it and how intense it is should be tailored to you and based on your abilities. Often times we make referrals to physical therapy where an individualized program can be developed to focus on each person's needs such as walking, managing spasticity, or fatigue. It's important to stick with a program as often the results are not felt until a few weeks into the program.     Many MS patients have worsening of their symptoms during the summer or when it's hot outside. This intolerance to heat is called Uhthoff's Phenomena. For these patients it's recommended to exercise in the morning or in the evening when the sun is not at its peak, or inside in an air conditioned environment. Aquatic exercise such as water aerobics or swimming are beneficial in patients with MS. If exercising in the heat is the only option, there are special clothes designed to keep the body cool including cooling neck wraps and vests. If all of these measures still don't work and symptoms are worse during exercise, it's recommended to discuss this with your neurologist.   Some patients say they are too tired to work out. Fatigue is an extremely common symptom in MS with up to 80% of patients experiencing it. A number of studies have shown that regular exercise, usually with some aerobic component, helps with MS-related fatigue. [3] If you feel too tired to work out you  "could try a restorative or yin yoga class. Yin classes tend to rebuild your energy levels and calm the nervous system. These two types of classes involve mostly stretching and relaxing. If you are new to yoga, it might be best to take a few private classes or go to a local yoga studio. However there are also some great resources online including yogaglo.com and yogavibes.com which both offer a 15-day free trial period to watch yoga videos at home. On these sites you can choose a beginner level 1 class or the style and teacher of your choice. If yoga is not your thing, even going for a short walk every day can improve your energy levels and overall health.     Next time you visit with your neurologist, be sure to discuss exercise and how you can incorporate it into your life.     References:  1. KASH Meza, et al. \"Fatigue, mood and quality of life improve in MS patients after progressive resistance training.\" Multiple sclerosis (2010).  2. ANNETTE Ron, et al. \"Randomized controlled trial of yoga and exercise in multiple sclerosis.\" Neurology 62.11 (2004): 4579-5332.  3. [email or web address]-fatigue-[email or web address]-[email or web address]]       Vit D 125 mcg (5000 international units) every day.   Vitamin B 12 1000 mcg every day.   Vitamin B 1 100 mg every day.   Biotin 10,000 mcg every day.   Alpha lipoic acid 600 mg every day  Fish oil/Omega 3-one capsule every day.   Coq-10 100 mg every day.         If you are not able to take all of these Vitamins and supplements daily, you can try to take the first 4 daily.          These symptoms are NOT suggestive for relapses (exaecerbations or flare-ups): headache, stomach pain, nausea, vomiting, sore throat, joint pain, muscle pain, rash, cough, back pain, flu like symptoms.      These symptoms can be MS relapses. Please note they are all neurological symptoms:    - Numbness and tingling affecting a " part of the body and last continuously for days or weeks (on and off symptoms are not suggestive for MS relapses).       - Muscle weakness in arms (more so in legs) that lasts continuously for days or weeks. Short-lasting symptoms and on and off symptoms are not suggestive for an MS relapse.     - Face numbness and weakness that last continuously for days or weeks. Short-lasting, intermittent symptoms or face or eye twitching are not MS relapses.       - Spinning sensation (vertigo), that lasts continuously for days or weeks. Lightheadedness, positional dizziness and short-lasting dizziness are not MS symptoms.     - Imbalance while walking or incoordination in hands and arms that lasts continuously for days or weeks.    - Major, new onset loss of vision (or major blurry vision) in ONE eye (not both eyes), that happen relatively suddenly and worsen over few days and is usually associated with eye pain (with eye movement) can be an MS relapse. Short-lasting change in vision or very long change in vision that can be corrected with glasses ARE NOT MS symptoms.     - Double vision while both eyes are open, which goes away with closing one of the eyes and last days or weeks; can be a symptoms of MS relapse. Momentary double vision, or double vision which still is present with closing either eye; IS NOT an MS symptom.       Alma Rosa Khanna RN  MS Nurse Navigator  [facility]  [address] [place] [address]  P:[phone] F:[phone]  [email or web address]

## 2024-01-11 NOTE — PROGRESS NOTES
"Patient ID: Susan Villanueva is a 33 y.o. female.       Objective:    Height 5' 5\" (1.651 m).    Physical Exam    Neurological Exam    Gait    [id number]: 7:33 Seconds .                    "

## 2024-01-17 ENCOUNTER — PATIENT MESSAGE (OUTPATIENT)
Dept: NEUROLOGY | Facility: CLINIC | Age: 34
End: 2024-01-17

## 2024-01-17 DIAGNOSIS — G35 RELAPSING REMITTING MULTIPLE SCLEROSIS (HCC): Primary | ICD-10-CM

## 2024-01-18 ENCOUNTER — TELEPHONE (OUTPATIENT)
Dept: NEUROLOGY | Facility: CLINIC | Age: 34
End: 2024-01-18

## 2024-01-18 DIAGNOSIS — G35 RELAPSING REMITTING MULTIPLE SCLEROSIS (HCC): Primary | ICD-10-CM

## 2024-01-18 NOTE — PATIENT COMMUNICATION
DMT start form:     DMT: Kesimpta    DMT discussed: Ocrevus and Kesimpta  Shared decision making: Yes   Indications: Relapsing remitting Multiple Sclerosis     New start: yes  Prior medications: none  Desired start date: ASAP  DMT switch reason: NA  Any contraindications to DMT's:  NA    Baseline safety labs: labs ordered.   Preferred infusion site: close to her home.   Any other instructions: NA        Thank you,     Dr. Harsh MD.   01/18/24   3:13 PM

## 2024-01-18 NOTE — TELEPHONE ENCOUNTER
1/18/24  3:15 PM  She would like to start Kesimpta please. LEI House MD       1/18/24  3:15 PM  Unsigned Note     DMT start form:      DMT: Kesimpta     DMT discussed: Ocrevus and Kesimpta  Shared decision making: Yes   Indications: Relapsing remitting Multiple Sclerosis      New start: yes  Prior medications: none  Desired start date: ASAP  DMT switch reason: NA  Any contraindications to DMT's:  NA     Baseline safety labs: labs ordered.   Preferred infusion site: close to her home.   Any other instructions: NA           Thank you,      Dr. Harsh MD.   01/18/24   3:13 PM

## 2024-01-23 NOTE — TELEPHONE ENCOUNTER
Labs not yet complete. [employer] message sent with reminder.     Kesimpta scripts pended for [facility] pharmacy. Please review and sign if agreeable.

## 2024-01-24 RX ORDER — OFATUMUMAB 20 MG/.4ML
INJECTION, SOLUTION SUBCUTANEOUS
Qty: 1.2 ML | Refills: 0 | Status: SHIPPED | OUTPATIENT
Start: 2024-01-24

## 2024-01-24 RX ORDER — OFATUMUMAB 20 MG/.4ML
20 INJECTION, SOLUTION SUBCUTANEOUS
Qty: 0.4 ML | Refills: 11 | Status: SHIPPED | OUTPATIENT
Start: 2024-01-24

## 2024-01-24 NOTE — TELEPHONE ENCOUNTER
1. Relapsing remitting multiple sclerosis (HCC)    - Kesimpta 20 MG/0.4ML SOAJ; Inject 20 mg under the skin every 28 days  Dispense: 0.4 mL; Refill: 11  - Kesimpta 20 MG/0.4ML SOAJ; Inject 20mg under the skin at weeks 0, 1, and 2.  Dispense: 1.2 mL; Refill: 0        Meet House MD.   Staff Neurologist  01/24/24   2:10 PM

## 2024-01-25 ENCOUNTER — TELEPHONE (OUTPATIENT)
Dept: NEUROLOGY | Facility: CLINIC | Age: 34
End: 2024-01-25

## 2024-01-25 NOTE — TELEPHONE ENCOUNTER
Per Teams communication w/[facility], the following PA has been initiated:    PA Key: [id number] for Kesimpta 20MG/0.4ML auto-injectors

## 2024-01-25 NOTE — TELEPHONE ENCOUNTER
Spoke with pt. She reports she is on the fence about starting Kesimpta. Discussed with her partner interest in getting pregnant within the next 18 months, not sure starting DMT is right for her. Started researching stem cell treatments. She wanted to f/u with Dr. House to see if he has any knowledge about stem cell treatments or what his recommendations would be. Would like to hold off on Kesimpta while discussing this.     Please advise.     Pt would like mychart message with response or call back.

## 2024-01-25 NOTE — TELEPHONE ENCOUNTER
Jayda Kaufman, RN routed conversation to You14 minutes ago (3:12 PM)     Jayda Kaufman RN14 minutes ago (3:12 PM)     CB  Per Teams communication w/[facility], the following PA has been initiated:     PA Key: [id number] for Kesimpta 20MG/0.4ML auto-injectors

## 2024-01-25 NOTE — TELEPHONE ENCOUNTER
Attempted to reach pt at [phone]- this is not a correct # for the patient. Other #s in chart are listed as [phone]. Phone # updated in epic.

## 2024-02-11 ENCOUNTER — TELEPHONE (OUTPATIENT)
Dept: OBGYN CLINIC | Facility: MEDICAL CENTER | Age: 34
End: 2024-02-11

## 2024-02-11 DIAGNOSIS — Z30.41 ORAL CONTRACEPTIVE PILL SURVEILLANCE: ICD-10-CM

## 2024-02-12 RX ORDER — NORETHINDRONE 0.35 MG/1
TABLET ORAL
Qty: 84 TABLET | Refills: 1 | OUTPATIENT
Start: 2024-02-12

## 2024-02-16 NOTE — TELEPHONE ENCOUNTER
Called pt to schedule yearly. Scheduled in April. Please refill script to the [facility] in [place].

## 2024-02-16 NOTE — TELEPHONE ENCOUNTER
Called pt to schedule yearly for prescription refill and patient relayed that she would like to be seen in [place]. Tried to transfer patient over but patient disconnected. Provided Michie phone number before disconnection.

## 2024-02-18 RX ORDER — ACETAMINOPHEN AND CODEINE PHOSPHATE 120; 12 MG/5ML; MG/5ML
1 SOLUTION ORAL DAILY
Qty: 84 TABLET | Refills: 0 | Status: SHIPPED | OUTPATIENT
Start: 2024-02-18

## 2024-03-09 ENCOUNTER — TELEPHONE (OUTPATIENT)
Age: 34
End: 2024-03-09

## 2024-03-09 NOTE — TELEPHONE ENCOUNTER
Left Message for  patient informing her. That Dr. House is now permanently at our [place] office. Patient appt will remain same date and time. But be switched over to our [place] office. Appt reminder card will me sent as well.

## 2024-03-21 ENCOUNTER — OFFICE VISIT (OUTPATIENT)
Dept: FAMILY MEDICINE CLINIC | Facility: CLINIC | Age: 34
End: 2024-03-21
Payer: COMMERCIAL

## 2024-03-21 VITALS
SYSTOLIC BLOOD PRESSURE: 132 MMHG | BODY MASS INDEX: 31.65 KG/M2 | TEMPERATURE: 97.8 F | WEIGHT: 190 LBS | OXYGEN SATURATION: 99 % | DIASTOLIC BLOOD PRESSURE: 86 MMHG | HEIGHT: 65 IN | HEART RATE: 101 BPM

## 2024-03-21 DIAGNOSIS — D22.9 MULTIPLE NEVI: ICD-10-CM

## 2024-03-21 DIAGNOSIS — F33.42 RECURRENT MAJOR DEPRESSIVE DISORDER, IN FULL REMISSION (HCC): ICD-10-CM

## 2024-03-21 DIAGNOSIS — D68.59 ANTITHROMBIN 3 DEFICIENCY (HCC): ICD-10-CM

## 2024-03-21 DIAGNOSIS — F17.210 SMOKING GREATER THAN 10 PACK YEARS: ICD-10-CM

## 2024-03-21 DIAGNOSIS — E28.2 PCOS (POLYCYSTIC OVARIAN SYNDROME): ICD-10-CM

## 2024-03-21 DIAGNOSIS — G35 MULTIPLE SCLEROSIS (HCC): Primary | ICD-10-CM

## 2024-03-21 PROBLEM — Z00.00 ANNUAL PHYSICAL EXAM: Status: RESOLVED | Noted: 2023-09-28 | Resolved: 2024-03-21

## 2024-03-21 PROBLEM — B37.9 CANDIDIASIS: Status: RESOLVED | Noted: 2023-11-16 | Resolved: 2024-03-21

## 2024-03-21 PROCEDURE — 99214 OFFICE O/P EST MOD 30 MIN: CPT | Performed by: NURSE PRACTITIONER

## 2024-03-21 NOTE — PROGRESS NOTES
Name: Susan Villanueva      : 1990      MRN: [id number]  Encounter Provider: WINSOME Luu  Encounter Date: 3/21/2024   Encounter department: [facility]    Assessment & Plan     1. Multiple sclerosis (HCC)  Assessment & Plan:  Currently in remission, no current symptoms. Treated for a flair in November-2023.      2. Recurrent major depressive disorder, in full remission (HCC)  Assessment & Plan:  Currently taking Cymbalta.      3. Smoking greater than 10 pack years  Assessment & Plan:  No longer smoking cigarette, currently vaping.      4. PCOS (polycystic ovarian syndrome)  Assessment & Plan:  Pt is hoping to conceive within the next 18 months. Still taking the Jencycla, progesterone only.      5. 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(HCC)  Assessment & Plan:  Hx of reaction on loxonox. Will have to consider blood thinner during pregnancy.      6. Multiple nevi  -     Ambulatory Referral to Dermatology; Future           Subjective      The patient is here for a follow up. She's recently had an MS flair in . She said it presented differently this last time. She had completed a full round of steroids with a lot of improvement. Her and her partner wish to have a baby within the next 18 months. She's concerned about her medications and conceiving. She's stopped smoking, does vape. She does not drink alcohol anymore.      Review of Systems   Constitutional:  Negative for chills, fatigue and fever.   HENT: Negative.     Eyes:  Negative for pain and visual disturbance.   Respiratory:  Negative for cough, chest tightness and shortness of breath.    Cardiovascular:  Negative for chest pain, palpitations and leg swelling.   Gastrointestinal:  Positive for constipation (chronic). Negative for abdominal pain, diarrhea, nausea and vomiting.   Endocrine: Negative.    Genitourinary:  Negative for difficulty urinating, dysuria, frequency, hematuria and urgency.  "  Musculoskeletal:  Negative for arthralgias and back pain.   Skin:  Negative for color change and rash.   Allergic/Immunologic: Negative.    Neurological:  Negative for dizziness, seizures, syncope, weakness, light-headedness, numbness and headaches.   Hematological: Negative.    Psychiatric/Behavioral: Negative.     All other systems reviewed and are negative.      Current Outpatient Medications on File Prior to Visit   Medication Sig   • BACILLUS COAGULANS-INULIN PO Take by mouth   • cholecalciferol (VITAMIN D3) 400 units tablet Take 400 Units by mouth daily   • DULoxetine (CYMBALTA) 30 mg delayed release capsule Take 1 capsule (30 mg total) by mouth daily   • KRILL OIL PO Take by mouth   • Multiple Vitamin (MULTI VITAMIN DAILY PO) Take by mouth   • norethindrone (Jencycla) 0.35 MG tablet Take 1 tablet (0.35 mg total) by mouth daily   • PREBIOTIC PRODUCT PO Take by mouth   • Probiotic Product (PROBIOTIC-10 PO) Take by mouth   • Turmeric 500 MG CAPS Take by mouth   • [DISCONTINUED] fluconazole (DIFLUCAN) 150 mg tablet take 1 tablet by mouth AS A ONE TIME DOSE   • [DISCONTINUED] ALPRAZolam (XANAX) 0.5 mg tablet Take 30 minutes prior to MRI may repeat x1 no driving if taking (Patient not taking: Reported on 1/11/2024)   • [DISCONTINUED] Kesimpta 20 MG/0.4ML SOAJ Inject 20 mg under the skin every 28 days (Patient not taking: Reported on 3/21/2024)   • [DISCONTINUED] Kesimpta 20 MG/0.4ML SOAJ Inject 20mg under the skin at weeks 0, 1, and 2. (Patient not taking: Reported on 3/21/2024)       Objective     /86 (BP Location: Left arm, Patient Position: Sitting)   Pulse 101   Temp 97.8 °F (36.6 °C) (Temporal)   Ht 5' 5\" (1.651 m)   Wt 86.2 kg (190 lb)   SpO2 99%   BMI 31.62 kg/m²     Physical Exam  Vitals and nursing note reviewed.   Constitutional:       Appearance: Normal appearance. She is not ill-appearing.   HENT:      Head: Normocephalic and atraumatic.      Right Ear: Tympanic membrane normal.      Left " Ear: Tympanic membrane normal.      Nose: Nose normal. No congestion.      Mouth/Throat:      Mouth: Mucous membranes are moist.      Pharynx: Oropharynx is clear.   Eyes:      Conjunctiva/sclera: Conjunctivae normal.   Cardiovascular:      Rate and Rhythm: Normal rate and regular rhythm.      Pulses: Normal pulses.      Heart sounds: Normal heart sounds.   Pulmonary:      Effort: Pulmonary effort is normal.      Breath sounds: Normal breath sounds.   Abdominal:      General: Abdomen is flat. Bowel sounds are normal.   Musculoskeletal:         General: No tenderness. Normal range of motion.      Cervical back: Normal range of motion. No tenderness.   Skin:     General: Skin is warm and dry.   Neurological:      Mental Status: She is alert and oriented to person, place, and time.   Psychiatric:         Mood and Affect: Mood normal.         Behavior: Behavior normal.         Thought Content: Thought content normal.         Judgment: Judgment normal.       WINSOME Luu

## 2024-04-01 ENCOUNTER — TELEPHONE (OUTPATIENT)
Dept: HEMATOLOGY ONCOLOGY | Facility: CLINIC | Age: 34
End: 2024-04-01

## 2024-04-01 NOTE — TELEPHONE ENCOUNTER
I called Susan in response to a referral that was received for patient to establish care with Hematology.     Outreach was made to schedule a consultation.    I left a voicemail explaining the reason for my call and advised patient to call [facility] at [phone].  Another attempt will be made to contact patient.

## 2024-04-25 ENCOUNTER — OFFICE VISIT (OUTPATIENT)
Age: 34
End: 2024-04-25
Payer: COMMERCIAL

## 2024-04-25 VITALS
WEIGHT: 192 LBS | BODY MASS INDEX: 31.99 KG/M2 | DIASTOLIC BLOOD PRESSURE: 74 MMHG | HEIGHT: 65 IN | OXYGEN SATURATION: 100 % | HEART RATE: 75 BPM | SYSTOLIC BLOOD PRESSURE: 130 MMHG

## 2024-04-25 DIAGNOSIS — G35 RELAPSING REMITTING MULTIPLE SCLEROSIS (HCC): Primary | ICD-10-CM

## 2024-04-25 DIAGNOSIS — G43.909 MIGRAINE HEADACHE: ICD-10-CM

## 2024-04-25 PROCEDURE — 99215 OFFICE O/P EST HI 40 MIN: CPT | Performed by: PSYCHIATRY & NEUROLOGY

## 2024-04-25 RX ORDER — SUMATRIPTAN 50 MG/1
50 TABLET, FILM COATED ORAL AS NEEDED
Qty: 9 TABLET | Refills: 2 | Status: SHIPPED | OUTPATIENT
Start: 2024-04-25 | End: 2024-05-06 | Stop reason: ALTCHOICE

## 2024-04-25 NOTE — PROGRESS NOTES
NEUROLOGY OUTPATIENT - FOLLOW UP PATIENT VISIT NOTE        NAME: Susan Villanueva  MRN: [id number]  : 1990     TODAY'S DATE: 24         HISTORY OF PRESENT ILLNESS (HPI):    Ms. Villanueva is a 33 y.o. female presenting with Multiple Sclerosis    MS HISTORY:  Symptoms onset:   Initial symptoms: Left-sided facial numbness (Dr[facility]  MS diagnosis: 2019  Disease course at onset: Relapsing remitting Multiple Sclerosis   Current disease course: Relapsing remitting Multiple Sclerosis   Subsequent symptoms:    2023-dizziness/vertiginous symptoms s/p 5 days of IVMP.   Family history of MS: No  Spinal tap: positive--11 bands, 0 in the serum. (Positive) protein 40 and CSF, WBC count 2, RBC count 5, and CSF glucose was 64   Prior DMT's for MS: Copaxone (stopped secondary to the side effects)  Current DMT's for MS: none  JCV test: 0.15 negative   NMO Ab: negative as [facility]  MOG Ab:not tested  Last MRI of the brain:  2023-new enhancing lesion noted.   Last MRI of the C spine: 2023-DDD, no lesions noted.   Last MRI of the T spine:2023-DDD, no lesions noted.     2023-presented to the ER with 1 week history of intermittent vertigo for which she underwent MRI of the brain which was showing enhancing lesions.  Previously she was diagnosed with multiple sclerosis when she presented with left-sided face numbness and was diagnosed based on the MRI findings and positive spinal tap.  She was initially put on Copaxone but had side effects.         INTERIM HISTORY:  Since last visit, Ms. Villanueva reports that their symptoms are stable.  During her last visit it was discussed that she needs to be on a disease modifying therapy and  we discussed about Ocrevus, rituximab and Kesimpta but due to the fact that the patient wanted to get pregnant we decided to delay the start of the medication    Since last clinic visit, Ms. Villanueva denies any new focal neurologic symptoms  suggestive of inflammatory disease activity. Specifically patient denies any episodes lasting greater than 24 hours of painful vision loss, double vision, oscillopsia, slurred speech, vertigo, incoordination, focal weakness, bowel or bladder incontinence, focal sensory loss, or Lhermitte's.     Progression: stable    She is mentioning that she is having once a week headaches that usually happen at the very middle part of the brain.  These headaches usually go away by the time she is sleeps and wake up the next morning but only in 1 episode the headaches lasted for about a day.  She mentioned that there is some light sensitivity along with some sensitivity, some nausea but no vomiting.  She was initially thinking that these headaches might be triggered by caffeine withdrawal but she has not been able to find a trigger for these headaches      MS ALVES:   Sensory symptoms (numbness, tingling and paresthesia): At baseline she has some numbness in the left perioral region which is intermittent and usually goes away within a few minutes  Weakness: No new symptoms reported   Spasms: No new symptoms reported   Vision problems (loss of vision or double vision): No new symptoms reported (except with her headaches)  Ambulatory dysfunction: No new symptoms reported   Vertigo and Dizziness: No new symptoms reported   Cognitive complaints: No new complaints  Speech complaints: Sometimes she would have word finding difficulties but nothing significant  Depression: No new symptoms reported   Anxiety: No new symptoms reported   Fatigue: Fatigue is present at baseline and some days she has to sleep at least 8 hours to continue working during the day  Bladder symptoms: No new complaints  Bowel symptoms: Some constipation  Lhermitte's sign: ++   Uhthoff's phenomenon: ++   MS hugs like symptoms:  a few episodes in the past    PRIOR NOTES:  MS ALVES:   Sensory symptoms (numbness, tingling and paresthesia):  She does have some numbness in  "the left side of the lip, which comes and goes.   Weakness: No new symptoms reported   Spasms: No new symptoms reported   Vision problems (loss of vision or double vision): No new symptoms reported   Ambulatory dysfunction: No new symptoms reported   Vertigo and Dizziness: No new symptoms reported   Cognitive complaints: Improved since the steroids   Speech complaints: Improved since the steroids.    Depression: No new symptoms reported   Anxiety: No new symptoms reported   Fatigue: Much better since the hospital --no day time naps  Bladder symptoms: last 6 months, she is having increased urgency. 3-5 accidents.   Bowel symptoms: No new symptoms reported   Lhermitte's sign: ++   Uhthoff's phenomenon: ++   MS hugs like symptoms:  a few episodes in the past    CURRENT OUTPATIENT MEDICATIONS:    Susan Villanueva has a current medication list which includes the following prescription(s): bacillus coagulans-inulin, cholecalciferol, duloxetine, krill oil, multiple vitamin, norethindrone, prebiotic product, probiotic product, and turmeric.       PHYSICAL EXAMINATION:   VITAL SIGNS:  height is 5' 5\" (1.651 m) and weight is 87.1 kg (192 lb). Her blood pressure is 130/74 and her pulse is 75. Her oxygen saturation is 100%.        NEUROLOGICAL EXAMINATION:    MENTAL STATUS EXAM: Alert, oriented to time place and person,     CRANIAL NERVE EXAMINATION:  I Not tested   II Normal visual fields to finger counting.   II, Ill,  IV, VI Pupils were symmetric, briskly reactive. No afferent pupillary defect.  Eye movements are full without nystagmus. No ptosis.   V Facial sensation were intact   VII Facial movements were symmetrical    VIII Hearing was intact   IX, X Intact   XI Shoulder shrug and head turn was normal   XII Tongue protrusion is in the mid line.          MOTOR EXAM:        Arm Right  Left Leg Right  Left   Deltoid 5/5 5/5 lliopsoas 5/5 5/5   Biceps 5/5 5/5 Quads 5/5 5/5   Triceps 5/5 5/5 Hamstrings 5/5 5/5   Wrist " "Extension 5/5 5/5 Ankle Dorsi Flexion 5/5 5/5   Wrist Flexion 5/5 5/5 Ankle Plantar Flexion 5/5 5/5               DEEP TENDON REFLEXES:     Brachioradialis Biceps Triceps Patellar Achilles   Right 2+ 3+ 3+ 3+ 3+   Left 3+ 2+ 3+ 3+ 3+            SENSORY EXAMINATION:   Sensation to dull touch Intact     COORDINATION:   normal finger to nose testing     GAIT/STATION:   normal        DIAGNOSTIC STUDIES:   PERTINENT LABS:     Lab Results   Component Value Date    WBC 16.66 (H) 12/08/2023     Lab Results   Component Value Date    HGB 15.0 12/08/2023     Lab Results   Component Value Date     12/08/2023     Lab Results   Component Value Date    LYMPHSABS 1.70 12/08/2023     No results found for: \"LABLYMP\"  Lab Results   Component Value Date    EOSABS 0.04 12/08/2023     No components found for: \"NEUTROPHILS\"    No results found for: \"LABMONO\"         No results found for: \"LABGLUC\"  Lab Results   Component Value Date    CREATININE 0.78 12/06/2023     Lab Results   Component Value Date    AST 14 12/05/2023     Lab Results   Component Value Date    ALT 13 12/05/2023     Lab Results   Component Value Date    ALKPHOS 49 12/05/2023     Lab Results   Component Value Date    AST 14 12/05/2023         Lab Results   Component Value Date    TSH 1.79 09/02/2020    HEPCAB Non-reactive 06/11/2022            NEUROIMAGING:  Results for orders placed in visit on 12/04/23    MRI brain w wo contrast      MRI brain w wo contrast            Results for orders placed during the hospital encounter of 11/30/23    MRI brain MS wo and w contrast    Impression  Multiple periventricular, subcortical, and pericallosal white matter demyelinating lesions consistent with the patient's history of multiple sclerosis, moderate plaque burden. New enhancing lesions within the right frontoparietal, left temporal, right  periatrial, and right centrum semiovale white matter as detailed compatible with acute demyelination compared to the prior outside " 1/22/2021 examination. The larger lesions demonstrate surrounding vasogenic edema. There are additional new subcentimeter  lesions without enhancement including a right pontine focus.    The study was marked in EPIC for immediate notification.        Workstation performed: [id number]       Results for orders placed during the hospital encounter of 12/05/23    MRI cervical spine w wo contrast    Impression  No evidence of cervical spinal cord demyelination.    No stenosis.          Workstation performed: [id number]      Results for orders placed during the hospital encounter of 12/05/23    MRI thoracic spine w wo contrast    Impression  No evidence of thoracic cord demyelination.    Single small T9-T10 disc herniation causing minimal central canal narrowing.      Workstation performed: [id number]      She had MRI of the orbits and MRI of the brain with contrast. It shows enhancement in the optic nerves as well as enhancement also seen on right posterior frontal white matter area and they did notice hyperintensity signal involving prince and lesser extent to midbrain and pontomedullary junction. Please review official report for details.    She had MRI of the thoracic spine. It show a tiny focal area of probable demyelination involving the right anterolateral aspect of the thoracic cord. Please review official report for details.      MRI BRAIN W CONTRAST 10/2/2019  Narrative  MRI brain and orbits with and without contrast    History: Left-sided facial numbness. Episode of double vision.    Comparison: MRI brain/MRA head and neck    Technique: Multiplanar MRI of the brain with special attention to  the orbits was performed utilizing short and long TE sequences.  Scanning was performed prior to and following the administration of  8.5 cc of intravenous contrast material.    Report:    Demonstrated are periventricular/callosal septal interface focal  areas of T2 and FLAIR hyperintensity suspicious for multiple  sclerosis.  Some of this abnormal signal involves the pontomedullary  junction, prince, and to a lesser extent the midbrain.    Cerebellar tonsils terminate above the foramen magnum. The  visualized intracranial, canalicular, and intraorbital portions of  the optic nerves are unremarkable in appearance. There is, however,  enhancement involving the distal infraorbital as well as within  portions of the optic nerves lesser extent findings are suspicious  for demyelination. The optic chiasm itself is unremarkable with no  abnormal enhancement. It should be noted that there is curvilinear  enhancement involving a single posterior frontal white matter lesion  in the right centrum semiovale.    Flow voids are unremarkable. Dural venous sinuses are unremarkable.    There is minimal to moderate ethmoid air cell thickening. There is  minimal bilateral frontal sinus because of thickening.    Impression  Impression:    Multiple sclerosis with active demyelination including enhancement  of the optic nerves. Neuromyelitis spectrum disorder is less likely  in the absence of spinal cord findings.        ASSESSMENT AND PLAN:    Ms. Villanueva is a 33 y.o.female  presenting with multiple sclerosis follow-up.  Briefly patient was diagnosed with multiple sclerosis based on left-sided facial numbness after undergoing an MRI of the brain which was showing lesions consistent with MS and positive spinal tap and meets the 2017 Pfeiffer's criteria for relapsing remitting MS. Patient  has a past medical history of Abnormal Pap smear of cervix (2015), Depression, Multiple sclerosis (HCC), Opioid abuse, in remission (HCC), Polycystic ovary syndrome, and Substance abuse (HCC)..  Neurological examination and neuroimaging including MRI of the brain were consistent with a diagnosis of MS.  During her last visit we discussed about her disease modifying therapy but as the patient wanted to get pregnant we decided to hold off to starting a disease modifying  therapy.      Relapsing remitting multiple sclerosis  Clinically the patient is doing well and I feel that she is in nonactive nonprogressive phase of the disease.  I also discussed about Ocrevus.  We do have a protocol for MS patients that want to get pregnant.  Usually the 2 months postinfusion are unsafe for conception but from 2 months to 6 months postinfusion are considered safe and at 6 months interval will do a pregnancy test.  If the patient is pregnant we will hold off giving any further Ocrevus but if the patient has negative pregnancy test then we continue the same cycle, I also discussed with the patient that pregnancy is a protective state against MS in which the relapse rate decreases by about one third but the postpartum stage increases the risk of relapses by over 33%.     Last MRI of the brain and cervical and thoracic spine were done in November and December 2023.  I would recommend that we should repeat the MRI of the brain and the cervical spine in June of this year unless he is pregnant by that time in which we would like to hold off to any further imaging until after postpartum stage    Supplements:   Vit D and other supplements as recommended. Written instructions were given. Vit D goal is above 40.   Exercise:   Regular exercise was recommended (At least 150 min a week)  Diet:   Mediterranean diet ( instructions were given)      Headaches  In regards to her headaches I recommended that she can use some healthy lifestyle modifications and try using the magnesium and riboflavin and supplements.  If the headaches do not improve after she has waking up she can use Imitrex on as-needed basis    Return in about 3 months (around 7/25/2024).       The management plan was discussed in detail with the patient and all questions were answered.     Ms. Villanueva was encouraged to contact our office with any questions or concerns and to contact the clinic or go to the nearest emergency room if symptoms  change or worsen.       I have spent a total of 42 min in reviewing and/or ordering tests, medications, or procedures, performing an examination or evaluation, reviewing pertinent history, counseling and educating the patient, referring and/or communicating with other health care professionals, documenting in the EMR and general coordination of care of Ms. Rich  today.           Meet House MD.   Staff Neurologist,   Neuroimmunology and Neuroinfectious disease  04/25/24     This report has been created through the use of voice recognition/text compilation software.  Typographical and content errors may occur with this process.  While efforts are made to detect and correct such errors, in some cases errors will persist.  For this reason, wording in this document should be considered in the proper context and not strictly verbatim.  If, when reviewing the document, an error is discovered, please let the office know at [phone]

## 2024-04-25 NOTE — LETTER
2024     WINSOME Leal  [facility]  [address] [place] [id number]    Patient: Susan Villanueva   YOB: 1990   Date of Visit: 2024       Dear Dr. Taylor:    Thank you for referring Susan Villanueva to me for evaluation. Below are my notes for this consultation.    If you have questions, please do not hesitate to call me. I look forward to following your patient along with you.         Sincerely,        Meet Fraser MD        CC: No Recipients    Meet Fraser MD  2024  3:50 PM  Sign when Signing Visit    NEUROLOGY OUTPATIENT - FOLLOW UP PATIENT VISIT NOTE        NAME: Susan Villanueva  MRN: [id number]  : 1990     TODAY'S DATE: 24         HISTORY OF PRESENT ILLNESS (HPI):    Ms. Villanueva is a 33 y.o. female presenting with Multiple Sclerosis    MS HISTORY:  Symptoms onset:   Initial symptoms: Left-sided facial numbness (Dr. Camacho) [facility]  MS diagnosis: 2019  Disease course at onset: Relapsing remitting Multiple Sclerosis   Current disease course: Relapsing remitting Multiple Sclerosis   Subsequent symptoms:    2023-dizziness/vertiginous symptoms s/p 5 days of IVMP.   Family history of MS: No  Spinal tap: positive--11 bands, 0 in the serum. (Positive) protein 40 and CSF, WBC count 2, RBC count 5, and CSF glucose was 64   Prior DMT's for MS: Copaxone (stopped secondary to the side effects)  Current DMT's for MS: none  JCV test: 0.15 negative   [facility]  MOG Ab:not tested  Last MRI of the brain:  2023-new enhancing lesion noted.   Last MRI of the C spine: 2023-DDD, no lesions noted.   Last MRI of the T spine:2023-DDD, no lesions noted.     2023-presented to the ER with 1 week history of intermittent vertigo for which she underwent MRI of the brain which was showing enhancing lesions.  Previously she was diagnosed with multiple sclerosis when she presented with left-sided face  numbness and was diagnosed based on the MRI findings and positive spinal tap.  She was initially put on Copaxone but had side effects.         INTERIM HISTORY:  Since last visit, Ms. Villanueva reports that their symptoms are stable.  During her last visit it was discussed that she needs to be on a disease modifying therapy and  we discussed about Ocrevus, rituximab and Kesimpta but due to the fact that the patient wanted to get pregnant we decided to delay the start of the medication    Since last clinic visit, Ms. Villanueva denies any new focal neurologic symptoms suggestive of inflammatory disease activity. Specifically patient denies any episodes lasting greater than 24 hours of painful vision loss, double vision, oscillopsia, slurred speech, vertigo, incoordination, focal weakness, bowel or bladder incontinence, focal sensory loss, or Lhermitte's.     Progression: stable    She is mentioning that she is having once a week headaches that usually happen at the very middle part of the brain.  These headaches usually go away by the time she is sleeps and wake up the next morning but only in 1 episode the headaches lasted for about a day.  She mentioned that there is some light sensitivity along with some sensitivity, some nausea but no vomiting.  She was initially thinking that these headaches might be triggered by caffeine withdrawal but she has not been able to find a trigger for these headaches      MS ROS:   Sensory symptoms (numbness, tingling and paresthesia): At baseline she has some numbness in the left perioral region which is intermittent and usually goes away within a few minutes  Weakness: No new symptoms reported   Spasms: No new symptoms reported   Vision problems (loss of vision or double vision): No new symptoms reported (except with her headaches)  Ambulatory dysfunction: No new symptoms reported   Vertigo and Dizziness: No new symptoms reported   Cognitive complaints: No new complaints  Speech  "complaints: Sometimes she would have word finding difficulties but nothing significant  Depression: No new symptoms reported   Anxiety: No new symptoms reported   Fatigue: Fatigue is present at baseline and some days she has to sleep at least 8 hours to continue working during the day  Bladder symptoms: No new complaints  Bowel symptoms: Some constipation  Lhermitte's sign: ++   Uhthoff's phenomenon: ++   MS hugs like symptoms:  a few episodes in the past    PRIOR NOTES:  MS ROS:   Sensory symptoms (numbness, tingling and paresthesia):  She does have some numbness in the left side of the lip, which comes and goes.   Weakness: No new symptoms reported   Spasms: No new symptoms reported   Vision problems (loss of vision or double vision): No new symptoms reported   Ambulatory dysfunction: No new symptoms reported   Vertigo and Dizziness: No new symptoms reported   Cognitive complaints: Improved since the steroids   Speech complaints: Improved since the steroids.    Depression: No new symptoms reported   Anxiety: No new symptoms reported   Fatigue: Much better since the hospital --no day time naps  Bladder symptoms: last 6 months, she is having increased urgency. 3-5 accidents.   Bowel symptoms: No new symptoms reported   Lhermitte's sign: ++   Uhthoff's phenomenon: ++   MS hugs like symptoms:  a few episodes in the past    CURRENT OUTPATIENT MEDICATIONS:    Susan Villanueva has a current medication list which includes the following prescription(s): bacillus coagulans-inulin, cholecalciferol, duloxetine, krill oil, multiple vitamin, norethindrone, prebiotic product, probiotic product, and turmeric.       PHYSICAL EXAMINATION:   VITAL SIGNS:  height is 5' 5\" (1.651 m) and weight is 87.1 kg (192 lb). Her blood pressure is 130/74 and her pulse is 75. Her oxygen saturation is 100%.        NEUROLOGICAL EXAMINATION:    MENTAL STATUS EXAM: Alert, oriented to time place and person,     CRANIAL NERVE EXAMINATION:  I Not tested " "  II Normal visual fields to finger counting.   II, Ill,  IV, VI Pupils were symmetric, briskly reactive. No afferent pupillary defect.  Eye movements are full without nystagmus. No ptosis.   V Facial sensation were intact   VII Facial movements were symmetrical    VIII Hearing was intact   IX, X Intact   XI Shoulder shrug and head turn was normal   XII Tongue protrusion is in the mid line.          MOTOR EXAM:        Arm Right  Left Leg Right  Left   Deltoid 5/5 5/5 lliopsoas 5/5 5/5   Biceps 5/5 5/5 Quads 5/5 5/5   Triceps 5/5 5/5 Hamstrings 5/5 5/5   Wrist Extension 5/5 5/5 Ankle Dorsi Flexion 5/5 5/5   Wrist Flexion 5/5 5/5 Ankle Plantar Flexion 5/5 5/5               DEEP TENDON REFLEXES:     Brachioradialis Biceps Triceps Patellar Achilles   Right 2+ 3+ 3+ 3+ 3+   Left 3+ 2+ 3+ 3+ 3+            SENSORY EXAMINATION:   Sensation to dull touch Intact     COORDINATION:   normal finger to nose testing     GAIT/STATION:   normal        DIAGNOSTIC STUDIES:   PERTINENT LABS:     Lab Results   Component Value Date    WBC 16.66 (H) 12/08/2023     Lab Results   Component Value Date    HGB 15.0 12/08/2023     Lab Results   Component Value Date     12/08/2023     Lab Results   Component Value Date    LYMPHSABS 1.70 12/08/2023     No results found for: \"LABLYMP\"  Lab Results   Component Value Date    EOSABS 0.04 12/08/2023     No components found for: \"NEUTROPHILS\"    No results found for: \"LABMONO\"         No results found for: \"LABGLUC\"  Lab Results   Component Value Date    CREATININE 0.78 12/06/2023     Lab Results   Component Value Date    AST 14 12/05/2023     Lab Results   Component Value Date    ALT 13 12/05/2023     Lab Results   Component Value Date    ALKPHOS 49 12/05/2023     Lab Results   Component Value Date    AST 14 12/05/2023         Lab Results   Component Value Date    TSH 1.79 09/02/2020    HEPCAB Non-reactive 06/11/2022            NEUROIMAGING:  Results for orders placed in visit on " 12/04/23    MRI brain w wo contrast      MRI brain w wo contrast            Results for orders placed during the hospital encounter of 11/30/23    MRI brain MS wo and w contrast    Impression  Multiple periventricular, subcortical, and pericallosal white matter demyelinating lesions consistent with the patient's history of multiple sclerosis, moderate plaque burden. New enhancing lesions within the right frontoparietal, left temporal, right  periatrial, and right centrum semiovale white matter as detailed compatible with acute demyelination compared to the prior outside 1/22/2021 examination. The larger lesions demonstrate surrounding vasogenic edema. There are additional new subcentimeter  lesions without enhancement including a right pontine focus.    The study was marked in EPIC for immediate notification.        Workstation performed: [id number]       Results for orders placed during the hospital encounter of 12/05/23    MRI cervical spine w wo contrast    Impression  No evidence of cervical spinal cord demyelination.    No stenosis.          Workstation performed: [id number]      Results for orders placed during the hospital encounter of 12/05/23    MRI thoracic spine w wo contrast    Impression  No evidence of thoracic cord demyelination.    Single small T9-T10 disc herniation causing minimal central canal narrowing.      Workstation performed: [id number]      She had MRI of the orbits and MRI of the brain with contrast. It shows enhancement in the optic nerves as well as enhancement also seen on right posterior frontal white matter area and they did notice hyperintensity signal involving prince and lesser extent to midbrain and pontomedullary junction. Please review official report for details.    She had MRI of the thoracic spine. It show a tiny focal area of probable demyelination involving the right anterolateral aspect of the thoracic cord. Please review official report for details.      MRI BRAIN W CONTRAST  10/2/2019  Narrative  MRI brain and orbits with and without contrast    History: Left-sided facial numbness. Episode of double vision.    Comparison: MRI brain/MRA head and neck    Technique: Multiplanar MRI of the brain with special attention to  the orbits was performed utilizing short and long TE sequences.  Scanning was performed prior to and following the administration of  8.5 cc of intravenous contrast material.    Report:    Demonstrated are periventricular/callosal septal interface focal  areas of T2 and FLAIR hyperintensity suspicious for multiple  sclerosis. Some of this abnormal signal involves the pontomedullary  junction, prince, and to a lesser extent the midbrain.    Cerebellar tonsils terminate above the foramen magnum. The  visualized intracranial, canalicular, and intraorbital portions of  the optic nerves are unremarkable in appearance. There is, however,  enhancement involving the distal infraorbital as well as within  portions of the optic nerves lesser extent findings are suspicious  for demyelination. The optic chiasm itself is unremarkable with no  abnormal enhancement. It should be noted that there is curvilinear  enhancement involving a single posterior frontal white matter lesion  in the right centrum semiovale.    Flow voids are unremarkable. Dural venous sinuses are unremarkable.    There is minimal to moderate ethmoid air cell thickening. There is  minimal bilateral frontal sinus because of thickening.    Impression  Impression:    Multiple sclerosis with active demyelination including enhancement  of the optic nerves. Neuromyelitis spectrum disorder is less likely  in the absence of spinal cord findings.        ASSESSMENT AND PLAN:    Ms. Villanueva is a 33 y.o.female  presenting with multiple sclerosis follow-up.  Briefly patient was diagnosed with multiple sclerosis based on left-sided facial numbness after undergoing an MRI of the brain which was showing lesions consistent with MS and  positive spinal tap and meets the 2017 Pfeiffer's criteria for relapsing remitting MS. Patient  has a past medical history of Abnormal Pap smear of cervix (2015), Depression, Multiple sclerosis (HCC), Opioid abuse, in remission (HCC), Polycystic ovary syndrome, and Substance abuse (HCC)..  Neurological examination and neuroimaging including MRI of the brain were consistent with a diagnosis of MS.  During her last visit we discussed about her disease modifying therapy but as the patient wanted to get pregnant we decided to hold off to starting a disease modifying therapy.      Relapsing remitting multiple sclerosis  Clinically the patient is doing well and I feel that she is in nonactive nonprogressive phase of the disease.  I also discussed about Ocrevus.  We do have a protocol for MS patients that want to get pregnant.  Usually the 2 months postinfusion are unsafe for conception but from 2 months to 6 months postinfusion are considered safe and at 6 months interval will do a pregnancy test.  If the patient is pregnant we will hold off giving any further Ocrevus but if the patient has negative pregnancy test then we continue the same cycle, I also discussed with the patient that pregnancy is a protective state against MS in which the relapse rate decreases by about one third but the postpartum stage increases the risk of relapses by over 33%.     Last MRI of the brain and cervical and thoracic spine were done in November and December 2023.  I would recommend that we should repeat the MRI of the brain and the cervical spine in June of this year unless he is pregnant by that time in which we would like to hold off to any further imaging until after postpartum stage    Supplements:   Vit D and other supplements as recommended. Written instructions were given. Vit D goal is above 40.   Exercise:   Regular exercise was recommended (At least 150 min a week)  Diet:   Mediterranean diet ( instructions were  given)      Headaches  In regards to her headaches I recommended that she can use some healthy lifestyle modifications and try using the magnesium and riboflavin and supplements.  If the headaches do not improve after she has waking up she can use Imitrex on as-needed basis    Return in about 3 months (around 7/25/2024).       The management plan was discussed in detail with the patient and all questions were answered.     Ms. Villanueva was encouraged to contact our office with any questions or concerns and to contact the clinic or go to the nearest emergency room if symptoms change or worsen.       I have spent a total of 42 min in reviewing and/or ordering tests, medications, or procedures, performing an examination or evaluation, reviewing pertinent history, counseling and educating the patient, referring and/or communicating with other health care professionals, documenting in the EMR and general coordination of care of Ms. Villanueva  today.           Meet House MD.   Staff Neurologist,   Neuroimmunology and Neuroinfectious disease  04/25/24     This report has been created through the use of voice recognition/text compilation software.  Typographical and content errors may occur with this process.  While efforts are made to detect and correct such errors, in some cases errors will persist.  For this reason, wording in this document should be considered in the proper context and not strictly verbatim.  If, when reviewing the document, an error is discovered, please let the office know at [phone]                  Meet Fraser MD  4/25/2024  3:50 PM  Sign when Signing Visit      Physical Exam    Neurological Exam    Gait    T25FW;8.24 SECONDS.

## 2024-04-25 NOTE — PROGRESS NOTES
"  Subjective:        Susan Villanueva is a 33 y.o. female. Here for Gynecologic Exam (Pap 9/23/22 abnormal /Birth control jencycla /Lmp/Sexually active /Gardasil completed)      GYN HPI  Menstrual cycle:  Patient reports irregular menstrual pattern, due to POP.    Vaginal c/o: deneis  External hemorrhoid  Urinary c/o: denies   Breast complaints:denies  She does do self breast Exams    Sexually active: yes- together x 4 yrs    Contraception: POP   Planning a baby in the next year.   Has Antithromb III def and MS- concerned for risks for pregnancy   She reports she feels safe at home.     HEALTH MAINTENANCE SCREENINGS:    Last Papanicolaou test:  04/26/2024   History of abnormal pap: Yes      IMMUNIZATIONS  Gardasil HPV vaccine Was Not completed    Hereditary Cancer Screening  Cancer-related family history includes Cancer in her paternal grandmother.    Social History     Tobacco Use   Smoking Status Former    Average packs/day: 1 pack/day for 7.0 years (7.0 ttl pk-yrs)    Types: Cigarettes    Start date: 2017   Smokeless Tobacco Current      Social History     Substance and Sexual Activity   Alcohol Use Not Currently     Social History     Substance and Sexual Activity   Drug Use Not Currently    Types: Heroin, Oxycodone      Substance Abuse Screening Completed. See hx and flowsheet.    The following portions of the patient's history were reviewed and updated as appropriate: allergies, current medications, past family history, past medical history, past social history, past surgical history, and problem list.         Review of Systems          Objective:  /94 (BP Location: Left arm, Patient Position: Sitting, Cuff Size: Standard)   Ht 5' 5\" (1.651 m)   Wt 87.1 kg (192 lb)   LMP 04/10/2024 (Approximate)   BMI 31.95 kg/m²        OBGyn Exam        Assessment/Plan:           ANNUAL GYN EXAM- Primary  Annual GYN examination completed today.   Health maintenance reviewed/updated as appropriate  Cervical cancer " screen: Previous pap smears and ASCCP screening guidelines have been reviewed. Pap collected.  Breast Health: Encouraged regular self breast exams.   Risk prevention and anticipatory guidance provided including:  Age related Calcium and vitamin D intake  Dietary and lifestyle recommendations based on her age and weight. body mass index is 31.95 kg/m²..    Tobacco and alcohol use, intervention ordered if applicable.   Condom use for prevention of STI's. decliend STI Screening.  Contraception:  Currently on POP . No signficant adverse effects. Desires to continue.   Considering baby next year. Pre-conception consult ordered with [facility]      Problem List Items Addressed This Visit       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(HCC)    Relevant Orders    Ambulatory Referral to Maternal Fetal Medicine    Multiple sclerosis (HCC)    Relevant Orders    Ambulatory Referral to Maternal Fetal Medicine     Other Visit Diagnoses       Screening for cervical cancer    -  Primary    Relevant Orders    Liquid-based pap, screening    HPV High Risk (Completed)    Encounter for counseling regarding contraception        Oral contraceptive pill surveillance        Relevant Medications    norethindrone (Jencycla) 0.35 MG tablet            Orders Placed This Encounter   Procedures    HPV High Risk    Ambulatory Referral to Maternal Fetal Medicine

## 2024-04-25 NOTE — LETTER
April 25, 2024     Patient: Suasn Villanueva  YOB: 1990  Date of Visit: 4/25/2024      To Whom it May Concern:    Susan Villanueva is under my professional care. Susan was seen in my office on 4/25/2024. Susan may return on 4/25/2024.     If you have any questions or concerns, please don't hesitate to call.         Sincerely,          Meet Fraser MD

## 2024-04-25 NOTE — PATIENT INSTRUCTIONS
These symptoms are NOT suggestive for relapses (exaecerbations or flare-ups): headache, stomach pain, nausea, vomiting, sore throat, joint pain, muscle pain, rash, cough, back pain, flu like symptoms.      These symptoms can be MS relapses. Please note they are all neurological symptoms:    - Numbness and tingling affecting a part of the body and last continuously for days or weeks (on and off symptoms are not suggestive for MS relapses).       - Muscle weakness in arms (more so in legs) that lasts continuously for days or weeks. Short-lasting symptoms and on and off symptoms are not suggestive for an MS relapse.     - Face numbness and weakness that last continuously for days or weeks. Short-lasting, intermittent symptoms or face or eye twitching are not MS relapses.       - Spinning sensation (vertigo), that lasts continuously for days or weeks. Lightheadedness, positional dizziness and short-lasting dizziness are not MS symptoms.     - Imbalance while walking or incoordination in hands and arms that lasts continuously for days or weeks.    - Major, new onset loss of vision (or major blurry vision) in ONE eye (not both eyes), that happen relatively suddenly and worsen over few days and is usually associated with eye pain (with eye movement) can be an MS relapse. Short-lasting change in vision or very long change in vision that can be corrected with glasses ARE NOT MS symptoms.     - Double vision while both eyes are open, which goes away with closing one of the eyes and last days or weeks; can be a symptoms of MS relapse. Momentary double vision, or double vision which still is present with closing either eye; IS NOT an MS symptom.         Lifestyle adjustments. Irrespective of treatment modalities applied, trigger control and lifestyle modification are indispensable to the successful management of migraine. This is especially important in children, adolescents, or pregnant women where drug treatments must be  especially limited.    Although there is no robust evidence for most of the recommendations, most are general health measures that, given the lack of adverse effects and the benefit for general well-being, we consider should be recommended in all patients.    Avoid triggers. Many patients attribute the onset or worsening of pain to specific triggers such as stress, sleep changes, food, or atmospheric changes, among others. It is even possible that the relationship occurs inversely, so that premonitory symptoms such as sleep disturbances and appetite 48 to 72 hours before the onset of pain can be misinterpreted by the patient as the trigger of migraine attacks. The therapeutic implications of this relationship are also unclear. There are possible triggers such as sleep deprivation, fasting, or certain foods that can be easily avoidable. But avoiding other triggers can lead to very restrictive lifestyles with a reduction in quality of life that does not outweigh the potential beneficial.    Sleep. Another complex relationship is headache and sleep. An excess or lack of sleep can trigger migraine attacks and at the same time rest is one of the most used treatments to improve the symptoms of the migraine attack. Additionally, migraine and other headaches occur comorbidly with sleep disorders. Patients with chronic migraine have a higher prevalence of sleep disorders, specifically poor sleep habits and nonrestorative rest.    Regarding general sleep measures for patients with headache, it is recommended to define regular sleep schedules that allow 8 hours of rest per day, insisting that they remain constant also during the weekend; have dinner 4 hours before bedtime and avoid liquids in the last two hours; and eliminate naps and avoid using screens, television, reading, or listening to music in bed. A nonpharmacological intervention to improve sleep habits can improve headache frequency and even reverse chronic to  episodic migraine.    Diet. In the scientific literature, but especially in the informative websites and magazines, multiple and varied diets are proposed that aim to reduce the frequency of headaches. There are two main approaches: elimination diets, which consist of suppressing potentially triggering foods such as chocolate, alcohol, cheese, nuts, or citrus fruits and diets that provide high or low amounts of certain components, ie, rich in vitamin B12, B6, or D or low in histamine, lactose, or fatty acids. The studies are not very rigorous and most do not have a control group (). In addition, it must be considered that food triggers were only associated with onset of headache in less than 10% of the participants.    Dietary recommendations for patients with migraine should be the same as for the general population with special emphasis on the prevention of obesity, which is a factor related to headache chronification. It is recommendable to have a varied diet, eating five meals a day to avoid periods of prolonged fasting and incorporating water intake to reach around 2.5 liters per day, which should be increased in case of physical activity or increase in temperature or humidity. Specific diets should be recommended solely based on whether there are other comorbidities in the patient.    Caffeine at moderate doses (< 400mg/day: equivalent to two cups of coffee) does not seem to have a negative effect on headache frequency although it should be taken regularly to avoid withdrawal headaches.    Although patients with migraine headaches and cluster headaches may be more susceptible to alcohol as a precipitant, there is no evidence to recommend abstinence from alcohol in all patients. Individual predisposition and cultural factors must be considered.    Exercise. Aerobic exercise can prevent or reduce symptoms of multiple chronic diseases, including headache. With some methodological limitations, there are studies  that demonstrate benefits of aerobic exercise as a therapeutic intervention to reduce the frequency and intensity of headaches, as well as the quality of life measured by questionnaires. Exercise can have a beneficial effect on headaches directly but also indirectly, improving sleep quality, mood, cardiovascular function, and preventing weight gain. In addition, it can improve the control of other diseases frequently comorbid with headache such as obesity, hypertension, anxiety, depression, or sleep disorders (). The clinical benefit of yoga as an add-on therapy in patients with episodic migraine has been demonstrated.             Vitamins for headache  Mg 400 mg daily for headache  RiboFlavin 400 mg daily for headache

## 2024-04-26 ENCOUNTER — ANNUAL EXAM (OUTPATIENT)
Dept: OBGYN CLINIC | Facility: MEDICAL CENTER | Age: 34
End: 2024-04-26
Payer: COMMERCIAL

## 2024-04-26 VITALS
SYSTOLIC BLOOD PRESSURE: 140 MMHG | HEIGHT: 65 IN | WEIGHT: 192 LBS | DIASTOLIC BLOOD PRESSURE: 94 MMHG | BODY MASS INDEX: 31.99 KG/M2

## 2024-04-26 DIAGNOSIS — D68.59 ANTITHROMBIN 3 DEFICIENCY (HCC): ICD-10-CM

## 2024-04-26 DIAGNOSIS — Z30.09 ENCOUNTER FOR COUNSELING REGARDING CONTRACEPTION: ICD-10-CM

## 2024-04-26 DIAGNOSIS — Z30.41 ORAL CONTRACEPTIVE PILL SURVEILLANCE: ICD-10-CM

## 2024-04-26 DIAGNOSIS — Z12.4 SCREENING FOR CERVICAL CANCER: Primary | ICD-10-CM

## 2024-04-26 DIAGNOSIS — G35 MULTIPLE SCLEROSIS (HCC): ICD-10-CM

## 2024-04-26 PROCEDURE — G0145 SCR C/V CYTO,THINLAYER,RESCR: HCPCS | Performed by: CLINICAL NURSE SPECIALIST

## 2024-04-26 PROCEDURE — S0612 ANNUAL GYNECOLOGICAL EXAMINA: HCPCS | Performed by: CLINICAL NURSE SPECIALIST

## 2024-04-26 PROCEDURE — G0476 HPV COMBO ASSAY CA SCREEN: HCPCS | Performed by: CLINICAL NURSE SPECIALIST

## 2024-04-26 RX ORDER — ACETAMINOPHEN AND CODEINE PHOSPHATE 120; 12 MG/5ML; MG/5ML
1 SOLUTION ORAL DAILY
Qty: 84 TABLET | Refills: 3 | Status: SHIPPED | OUTPATIENT
Start: 2024-04-26

## 2024-05-03 LAB
LAB AP GYN PRIMARY INTERPRETATION: NORMAL
Lab: NORMAL

## 2024-05-03 NOTE — RESULT ENCOUNTER NOTE
Pap again normal, but HPV persistent. Will need colpo  Please notify and schedule  Premedicate with motrin

## 2024-05-06 ENCOUNTER — OFFICE VISIT (OUTPATIENT)
Dept: FAMILY MEDICINE CLINIC | Facility: CLINIC | Age: 34
End: 2024-05-06
Payer: COMMERCIAL

## 2024-05-06 VITALS
HEART RATE: 94 BPM | TEMPERATURE: 98.4 F | BODY MASS INDEX: 32.49 KG/M2 | DIASTOLIC BLOOD PRESSURE: 82 MMHG | SYSTOLIC BLOOD PRESSURE: 114 MMHG | OXYGEN SATURATION: 98 % | WEIGHT: 195 LBS | HEIGHT: 65 IN

## 2024-05-06 DIAGNOSIS — K64.4 EXTERNAL HEMORRHOID: Primary | ICD-10-CM

## 2024-05-06 DIAGNOSIS — Z80.0 FAMILY HISTORY OF COLON CANCER: ICD-10-CM

## 2024-05-06 PROBLEM — D72.829 LEUKOCYTOSIS: Status: RESOLVED | Noted: 2023-12-06 | Resolved: 2024-05-06

## 2024-05-06 PROBLEM — R42 DIZZINESS: Status: RESOLVED | Noted: 2023-11-16 | Resolved: 2024-05-06

## 2024-05-06 PROCEDURE — 99213 OFFICE O/P EST LOW 20 MIN: CPT | Performed by: NURSE PRACTITIONER

## 2024-05-06 RX ORDER — HYDROCORTISONE 25 MG/G
CREAM TOPICAL 2 TIMES DAILY
Qty: 28 G | Refills: 0 | Status: SHIPPED | OUTPATIENT
Start: 2024-05-06 | End: 2024-05-20

## 2024-05-06 NOTE — ASSESSMENT & PLAN NOTE
Patient with a small hemorrhoid topical proctosol ordered and also is taking magnesium and helping with her constipation and is requesting referral secondary to her PGM age 67 with colon cancer

## 2024-05-06 NOTE — PROGRESS NOTES
Name: Susan Villanueva      : 1990      MRN: [id number]  Encounter Provider: WINSOME Luu  Encounter Date: 2024   Encounter department: [facility]    Assessment & Plan     1. External hemorrhoid  Assessment & Plan:  Patient with a small hemorrhoid topical proctosol ordered and also is taking magnesium and helping with her constipation and is requesting referral secondary to her PGM age 67 with colon cancer     Orders:  -     Ambulatory Referral to Colorectal Surgery; Future  -     hydrocortisone (ANUSOL-HC) 2.5 % rectal cream; Apply topically 2 (two) times a day for 14 days    2. Family history of colon cancer  -     Ambulatory Referral to Colorectal Surgery; Future           Subjective      Patient here today and reports that she has a history of hemorrhoids and saw rectal surgeon in the past and was treated with fiber and stool softeners and her hemorrhoids healed up. Patient now recently for the past three weeks she has external hemorrhoids and thinking she has internal hemorrhoids as well and is taking magnesium for the past five days and is helping with her stool but is still there she is concerned that her grandma passed from colon cancer. Patient has seen Dr. Bryant in the past and would like to return No blood in her stool hemorrhoid size of a dime and only one is hurting She is following with neurology regarding her headaches and having migraines.       Review of Systems   Constitutional:  Negative for activity change, appetite change, chills, diaphoresis, fatigue, fever and unexpected weight change.   HENT:  Negative for congestion, ear pain, hearing loss, postnasal drip, sinus pressure, sinus pain, sneezing and sore throat.    Eyes:  Negative for pain, redness and visual disturbance.   Respiratory:  Negative for cough and shortness of breath.    Cardiovascular:  Negative for chest pain and leg swelling.   Gastrointestinal:  Positive for constipation.  "Negative for abdominal pain, diarrhea, nausea, rectal pain and vomiting.        And hemorrhoids    Endocrine: Negative.    Musculoskeletal:  Negative for arthralgias.   Allergic/Immunologic: Negative.    Neurological:  Positive for headaches. Negative for dizziness and light-headedness.   Hematological: Negative.    Psychiatric/Behavioral:  Negative for behavioral problems and dysphoric mood.        Current Outpatient Medications on File Prior to Visit   Medication Sig   • BACILLUS COAGULANS-INULIN PO Take by mouth   • cholecalciferol (VITAMIN D3) 400 units tablet Take 400 Units by mouth daily   • DULoxetine (CYMBALTA) 30 mg delayed release capsule Take 1 capsule (30 mg total) by mouth daily   • KRILL OIL PO Take by mouth   • Multiple Vitamin (MULTI VITAMIN DAILY PO) Take by mouth   • norethindrone (Jencycla) 0.35 MG tablet Take 1 tablet (0.35 mg total) by mouth daily   • Probiotic Product (PROBIOTIC-10 PO) Take by mouth   • Turmeric 500 MG CAPS Take by mouth   • [DISCONTINUED] PREBIOTIC PRODUCT PO Take by mouth (Patient not taking: Reported on 4/26/2024)   • [DISCONTINUED] SUMAtriptan (Imitrex) 50 mg tablet Take 1 tablet (50 mg total) by mouth if needed for migraine (Take one tab at the start of the headache, if no relief can repeat the dose in 2 hours but not more than 3 doses in 24 hours.) for up to 1 dose (Patient not taking: Reported on 4/26/2024)       Objective     /82 (BP Location: Left arm, Patient Position: Sitting, Cuff Size: Large)   Pulse 94   Temp 98.4 °F (36.9 °C)   Ht 5' 5\" (1.651 m)   Wt 88.5 kg (195 lb)   LMP 04/10/2024 (Approximate)   SpO2 98%   BMI 32.45 kg/m²     Physical Exam  Vitals and nursing note reviewed.   Constitutional:       General: She is not in acute distress.     Appearance: She is well-developed.   HENT:      Head: Normocephalic and atraumatic.      Right Ear: Tympanic membrane normal.      Left Ear: Tympanic membrane normal.      Nose: Nose normal.      Mouth/Throat: "      Mouth: Mucous membranes are moist.   Eyes:      Pupils: Pupils are equal, round, and reactive to light.   Neck:      Thyroid: No thyromegaly.   Cardiovascular:      Rate and Rhythm: Normal rate and regular rhythm.      Heart sounds: Normal heart sounds. No murmur heard.  Pulmonary:      Effort: Pulmonary effort is normal. No respiratory distress.      Breath sounds: Normal breath sounds. No wheezing.   Abdominal:      General: Bowel sounds are normal.      Palpations: Abdomen is soft.   Genitourinary:     Rectum: External hemorrhoid present.      Comments: Small external hemorrhoid   Musculoskeletal:         General: Normal range of motion.      Cervical back: Normal range of motion.   Skin:     General: Skin is warm and dry.   Neurological:      General: No focal deficit present.      Mental Status: She is alert and oriented to person, place, and time.   Psychiatric:         Mood and Affect: Mood normal.       WINSOME Luu

## 2024-05-30 ENCOUNTER — OFFICE VISIT (OUTPATIENT)
Dept: HEMATOLOGY ONCOLOGY | Facility: CLINIC | Age: 34
End: 2024-05-30
Payer: COMMERCIAL

## 2024-05-30 ENCOUNTER — TELEPHONE (OUTPATIENT)
Age: 34
End: 2024-05-30

## 2024-05-30 VITALS
OXYGEN SATURATION: 100 % | HEIGHT: 65 IN | TEMPERATURE: 98.2 F | HEART RATE: 72 BPM | SYSTOLIC BLOOD PRESSURE: 118 MMHG | RESPIRATION RATE: 18 BRPM | DIASTOLIC BLOOD PRESSURE: 84 MMHG | BODY MASS INDEX: 32.07 KG/M2 | WEIGHT: 192.5 LBS

## 2024-05-30 DIAGNOSIS — D68.59 ANTITHROMBIN 3 DEFICIENCY (HCC): ICD-10-CM

## 2024-05-30 PROCEDURE — 99203 OFFICE O/P NEW LOW 30 MIN: CPT | Performed by: PHYSICIAN ASSISTANT

## 2024-05-30 NOTE — PROGRESS NOTES
[facility] [address] [address]  Hematology Ambulatory Consult  Susan Villanueva, 1990, [id number]  5/30/2024      Assessment and Plan   1. 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(HCC)  - Ambulatory Referral to Hematology / Oncology    In summary this is a 33-year-old female with history of MS, Antithrombin III deficiency who is seen in consultation today for desired pregnancy.  Patient has no personal history of VTE.  Her father does have history of VTE and also has Antithrombin III deficiency.  She has possible allergic reaction to Lovenox in the past.      Discussion/plan  Injection site reaction vs drug allergy reaction to lovenox previously. Case discussed with Dr. Sewell.   Patient will require anticoagulation prophylaxis during and after pregnancy. She will also require ac prophylaxis post-operatively following other surgeries.   For future pregnancy could re-trial enoxaparin 40mg suq daily. Alternatively, may consider VTE prophylaxis with Fondaparinux 2.5mg SC qd. She understands data is limited with this drug during pregnancy although it likely is a reasonable alternative to enoxaparin per UTD review.   She may benefit from AT concentrate just prior to delivery (due to family history of VTE, MS, obesity etc) to help reduce her risk   To help minimize injection site reaction recommend to allow the medication to sit at room temp for 30min prior to injection and apply ice pack to the site prior to the injection.   Patient was educated on ways to reduce her risk of VTE including avoiding estrogen containing OBC, maintaining healthy weight, ambulating every 2 hours during prolonged period of sittig or traveling, and wearing compression stocking. She was also education on symptoms of DVT/PE including swelling, pain or tenderness of extremities or shortness of breath/chest pain.  Patient will return to our office on a as needed basis.   In the event of pregnancy, she should be started on anticoagulation immediately and follow-up with our office as soon as possible.     I have spent a total time of 30 minutes on 24 in caring for this patient including Risks and benefits of tx options, Instructions for management, Risk factor reductions, Documenting in the medical record, and Reviewing / ordering tests, medicine, procedures  .      Patient voiced agreement and understanding to the above.   Patient knows to call the Hematology/Oncology office with any questions and concerns regarding the above.    Barrier(s) to care: None.    Mary No PA-C  Medical Oncology/Hematology  [facility]    Subjective     Chief Complaint   Patient presents with    Consult     Antithrombin 3 deficiency-thromo panel -Hx of reaction on loxonox. Will have to consider blood thinner during pregnancy. from ref note       Referring provider    WINSOME Luu  [address]  [place] [address]    History of present illness: 33-year-old female with history of MS who was referred by her PCP for history of Antithrombin III deficiency and desired pregnancy.    Patient reports MS was diagnosed in 2019.  She tried Copaxone in the past however she did not tolerate the medication.  She currently is not on any treatment and is under observation.  Patient has no personal history of DVT.  Her father does have history of PE and had been diagnosed with Antithrombin III deficiency.  Patient underwent thrombophilia testing which revealed she also has Antithrombin III deficiency with AT level 87%.  Remainder of thrombosis panel was unremarkable.  She is on mini progesterone pill for birth control however she does desire pregnancy in the next year or so.  She was pregnant 1 time previously and had possible chemical versus ectopic pregnancy and had medical  following recommendation by her gynecologist.  Patient reports  history of Lovenox allergy in the past with prior injections; she had erythema and pruritus around injection site.    06/2022: Thrombosis panel - obtained in outpatient setting. No acute illness or ac use during lab draw.   Factor V Leiden, prothrombin gene mutation negative.  Beta-2 glycoprotein antibodies, cardiolipin antibody and lupus anticoagulant were negative.  Protein S activity was high with normal protein S free/total antigen.  Protein C activity was normal  Antithrombin III activity was mildly low 87%    05/30/24: No edema, pain in LE. Denies shortness of breath or chest pain.    Review of Systems   All other systems reviewed and are negative.      Past Medical History:   Diagnosis Date    Abnormal Pap smear of cervix 2015    Depression     Multiple sclerosis (HCC)     Opioid abuse, in remission (HCC)     Polycystic ovary syndrome     Substance abuse (HCC)      History reviewed. No pertinent surgical history.  Family History   Problem Relation Age of Onset    Hypertension Mother     Anxiety disorder Mother     Deep vein thrombosis Father     Pulmonary embolism Father     Cancer Paternal Grandmother      Social History     Socioeconomic History    Marital status: Single     Spouse name: None    Number of children: None    Years of education: None    Highest education level: None   Occupational History    None   Tobacco Use    Smoking status: Former     Average packs/day: 1 pack/day for 7.0 years (7.0 ttl pk-yrs)     Types: Cigarettes     Start date: 2017    Smokeless tobacco: Current   Vaping Use    Vaping status: Every Day    Substances: Nicotine   Substance and Sexual Activity    Alcohol use: Not Currently    Drug use: Not Currently     Types: Heroin, Oxycodone    Sexual activity: Yes     Partners: Male     Birth control/protection: Condom Male, OCP   Other Topics Concern    None   Social History Narrative    None     Social Determinants of Health     Financial Resource Strain: Not on file   Food  "Insecurity: Not on file   Transportation Needs: Not on file   Physical Activity: Not on file   Stress: Not on file   Social Connections: Not on file   Intimate Partner Violence: Not on file   Housing Stability: Not on file         Current Outpatient Medications:     BACILLUS COAGULANS-INULIN PO, Take by mouth, Disp: , Rfl:     cholecalciferol (VITAMIN D3) 400 units tablet, Take 400 Units by mouth daily, Disp: , Rfl:     DULoxetine (CYMBALTA) 30 mg delayed release capsule, Take 1 capsule (30 mg total) by mouth daily, Disp: 90 capsule, Rfl: 3    hydrocortisone (ANUSOL-HC) 2.5 % rectal cream, Apply topically 2 (two) times a day for 14 days, Disp: 28 g, Rfl: 0    KRILL OIL PO, Take by mouth, Disp: , Rfl:     Magnesium 400 MG CAPS, Take by mouth, Disp: , Rfl:     Multiple Vitamin (MULTI VITAMIN DAILY PO), Take by mouth, Disp: , Rfl:     norethindrone (Jencycla) 0.35 MG tablet, Take 1 tablet (0.35 mg total) by mouth daily, Disp: 84 tablet, Rfl: 3    Probiotic Product (PROBIOTIC-10 PO), Take by mouth, Disp: , Rfl:     Turmeric 500 MG CAPS, Take by mouth, Disp: , Rfl:   Allergies   Allergen Reactions    Iodine - Food Allergy Hives    Shrimp Flavor - Food Allergy Hives    Lovenox [Enoxaparin] Rash       Objective   /84   Pulse 72   Temp 98.2 °F (36.8 °C) (Temporal)   Resp 18   Ht 5' 5\" (1.651 m)   Wt 87.3 kg (192 lb 8 oz)   SpO2 100%   BMI 32.03 kg/m²   Physical Exam  Vitals reviewed.   HENT:      Head: Normocephalic.   Cardiovascular:      Rate and Rhythm: Normal rate and regular rhythm.   Pulmonary:      Effort: Pulmonary effort is normal.   Musculoskeletal:      Cervical back: Neck supple.   Skin:     Findings: No rash.   Neurological:      Mental Status: She is alert.         Result Review  Labs:  No visits with results within 30 Day(s) from this visit.   Latest known visit with results is:   Annual Exam on 04/26/2024   Component Date Value Ref Range Status    Case Report 04/26/2024    Final                  "   Value:Gynecologic Cytology Report                       Case: [id number]                                  Authorizing Provider:  WINSOME Leal      Collected:           04/26/2024 1643              Ordering Location:     [facility]      Received:            04/26/2024 1644                                     [facility]                                                                          First Screen:          Ramonita Garcia                                                                  Specimen:    LIQUID-BASED PAP, SCREENING, Cervix                                                        Primary Interpretation 04/26/2024 Negative for intraepithelial lesion or malignancy   Final    Specimen Adequacy 04/26/2024 Satisfactory for evaluation. Endocervical/transformation zone component present.   Final    Note 04/26/2024    Final                    Value:This result contains rich text formatting which cannot be displayed here.    Additional Information 04/26/2024    Final                    Value:This result contains rich text formatting which cannot be displayed here.    Gross Description 04/26/2024    Final                    Value:This result contains rich text formatting which cannot be displayed here.    HPV Other HR 04/26/2024 Positive (A)  Negative Final    Specimen is positive for the DNA of any one of, or combination of the following high risk HPV types: 31,33,35,39,45,51,52,56,58,59,66,68.        HPV16 04/26/2024 Negative  Negative Final    HPV18 04/26/2024 Negative  Negative Final       Imaging:   I have reviewed all relevant imaging  Please note:  This report has been generated by a voice recognition software system. Therefore there may be syntax, spelling, and/or grammatical errors. Please call if you have any questions.

## 2024-05-30 NOTE — TELEPHONE ENCOUNTER
Patient trying to move on 7/29 if cancellation becomes available in [facility].      For morning.      She has another appointment she is trying to have this appointment on same day       I advised we don't have any cancellation as of right now.

## 2024-06-06 ENCOUNTER — PATIENT MESSAGE (OUTPATIENT)
Age: 34
End: 2024-06-06

## 2024-06-06 DIAGNOSIS — G35 MULTIPLE SCLEROSIS EXACERBATION (HCC): Primary | ICD-10-CM

## 2024-06-08 ENCOUNTER — APPOINTMENT (OUTPATIENT)
Dept: LAB | Facility: CLINIC | Age: 34
End: 2024-06-08
Payer: COMMERCIAL

## 2024-06-08 DIAGNOSIS — G35 RELAPSING REMITTING MULTIPLE SCLEROSIS (HCC): ICD-10-CM

## 2024-06-08 LAB
ALBUMIN SERPL BCP-MCNC: 4.2 G/DL (ref 3.5–5)
ALP SERPL-CCNC: 50 U/L (ref 34–104)
ALT SERPL W P-5'-P-CCNC: 14 U/L (ref 7–52)
ANION GAP SERPL CALCULATED.3IONS-SCNC: 12 MMOL/L (ref 4–13)
AST SERPL W P-5'-P-CCNC: 15 U/L (ref 13–39)
BASOPHILS # BLD AUTO: 0.05 THOUSANDS/ÂΜL (ref 0–0.1)
BASOPHILS NFR BLD AUTO: 1 % (ref 0–1)
BILIRUB SERPL-MCNC: 0.56 MG/DL (ref 0.2–1)
BUN SERPL-MCNC: 15 MG/DL (ref 5–25)
CALCIUM SERPL-MCNC: 8.9 MG/DL (ref 8.4–10.2)
CHLORIDE SERPL-SCNC: 104 MMOL/L (ref 96–108)
CO2 SERPL-SCNC: 24 MMOL/L (ref 21–32)
CREAT SERPL-MCNC: 0.76 MG/DL (ref 0.6–1.3)
EOSINOPHIL # BLD AUTO: 0.23 THOUSAND/ÂΜL (ref 0–0.61)
EOSINOPHIL NFR BLD AUTO: 5 % (ref 0–6)
ERYTHROCYTE [DISTWIDTH] IN BLOOD BY AUTOMATED COUNT: 12.4 % (ref 11.6–15.1)
GFR SERPL CREATININE-BSD FRML MDRD: 104 ML/MIN/1.73SQ M
GLUCOSE P FAST SERPL-MCNC: 78 MG/DL (ref 65–99)
HBV SURFACE AB SER-ACNC: 10.7 MIU/ML
HBV SURFACE AG SER QL: NORMAL
HCT VFR BLD AUTO: 43.9 % (ref 34.8–46.1)
HCV AB SER QL: NORMAL
HGB BLD-MCNC: 15 G/DL (ref 11.5–15.4)
HIV 1+2 AB+HIV1 P24 AG SERPL QL IA: NORMAL
HIV 2 AB SERPL QL IA: NORMAL
HIV1 AB SERPL QL IA: NORMAL
HIV1 P24 AG SERPL QL IA: NORMAL
IMM GRANULOCYTES # BLD AUTO: 0.01 THOUSAND/UL (ref 0–0.2)
IMM GRANULOCYTES NFR BLD AUTO: 0 % (ref 0–2)
LYMPHOCYTES # BLD AUTO: 1.62 THOUSANDS/ÂΜL (ref 0.6–4.47)
LYMPHOCYTES NFR BLD AUTO: 35 % (ref 14–44)
MCH RBC QN AUTO: 31.3 PG (ref 26.8–34.3)
MCHC RBC AUTO-ENTMCNC: 34.2 G/DL (ref 31.4–37.4)
MCV RBC AUTO: 92 FL (ref 82–98)
MONOCYTES # BLD AUTO: 0.48 THOUSAND/ÂΜL (ref 0.17–1.22)
MONOCYTES NFR BLD AUTO: 10 % (ref 4–12)
NEUTROPHILS # BLD AUTO: 2.31 THOUSANDS/ÂΜL (ref 1.85–7.62)
NEUTS SEG NFR BLD AUTO: 49 % (ref 43–75)
NRBC BLD AUTO-RTO: 0 /100 WBCS
PLATELET # BLD AUTO: 215 THOUSANDS/UL (ref 149–390)
PMV BLD AUTO: 10.6 FL (ref 8.9–12.7)
POTASSIUM SERPL-SCNC: 3.9 MMOL/L (ref 3.5–5.3)
PROT SERPL-MCNC: 6.6 G/DL (ref 6.4–8.4)
RBC # BLD AUTO: 4.79 MILLION/UL (ref 3.81–5.12)
SODIUM SERPL-SCNC: 140 MMOL/L (ref 135–147)
WBC # BLD AUTO: 4.7 THOUSAND/UL (ref 4.31–10.16)

## 2024-06-08 PROCEDURE — 36415 COLL VENOUS BLD VENIPUNCTURE: CPT

## 2024-06-08 PROCEDURE — 86711 JOHN CUNNINGHAM ANTIBODY: CPT

## 2024-06-08 PROCEDURE — 86480 TB TEST CELL IMMUN MEASURE: CPT

## 2024-06-08 PROCEDURE — 87389 HIV-1 AG W/HIV-1&-2 AB AG IA: CPT

## 2024-06-08 PROCEDURE — 87340 HEPATITIS B SURFACE AG IA: CPT

## 2024-06-08 PROCEDURE — 80053 COMPREHEN METABOLIC PANEL: CPT

## 2024-06-08 PROCEDURE — 86803 HEPATITIS C AB TEST: CPT

## 2024-06-08 PROCEDURE — 85025 COMPLETE CBC W/AUTO DIFF WBC: CPT

## 2024-06-08 PROCEDURE — 86706 HEP B SURFACE ANTIBODY: CPT

## 2024-06-09 LAB
GAMMA INTERFERON BACKGROUND BLD IA-ACNC: 0.04 IU/ML
M TB IFN-G BLD-IMP: NEGATIVE
M TB IFN-G CD4+ BCKGRND COR BLD-ACNC: 0.03 IU/ML
M TB IFN-G CD4+ BCKGRND COR BLD-ACNC: 0.06 IU/ML
MITOGEN IGNF BCKGRD COR BLD-ACNC: 9.96 IU/ML

## 2024-06-10 RX ORDER — LORAZEPAM 0.5 MG/1
0.5 TABLET ORAL AS NEEDED
Qty: 4 TABLET | Refills: 0 | Status: SHIPPED | OUTPATIENT
Start: 2024-06-10 | End: 2024-06-20 | Stop reason: ALTCHOICE

## 2024-06-10 NOTE — PATIENT COMMUNICATION
6/10/24, 11:28    Susan Villanueva. My phone number is [phone] Dr. House called in an MRI for me. When I went to schedule, it was not put in as a stat just put in as a routine. So I was unable to adjust my appointment for my flare. Additionally, if I did receive a stat mri, i would need medication for it. Thank you.

## 2024-06-10 NOTE — PATIENT COMMUNICATION
1. Multiple sclerosis exacerbation (HCC)    - MRI brain w wo contrast; Future  - MRI cervical spine w wo contrast; Future  - CBC and differential; Future  - Comprehensive metabolic panel; Future  - UA w Reflex to Microscopic w Reflex to Culture        Meet House MD.   Staff Neurologist  06/10/24   10:47 AM

## 2024-06-11 ENCOUNTER — APPOINTMENT (OUTPATIENT)
Dept: RADIOLOGY | Facility: CLINIC | Age: 34
End: 2024-06-11
Payer: COMMERCIAL

## 2024-06-11 ENCOUNTER — APPOINTMENT (OUTPATIENT)
Dept: LAB | Facility: CLINIC | Age: 34
End: 2024-06-11
Payer: COMMERCIAL

## 2024-06-11 ENCOUNTER — OFFICE VISIT (OUTPATIENT)
Dept: FAMILY MEDICINE CLINIC | Facility: CLINIC | Age: 34
End: 2024-06-11
Payer: COMMERCIAL

## 2024-06-11 ENCOUNTER — TELEPHONE (OUTPATIENT)
Age: 34
End: 2024-06-11

## 2024-06-11 VITALS
HEIGHT: 65 IN | BODY MASS INDEX: 31.99 KG/M2 | TEMPERATURE: 97.1 F | DIASTOLIC BLOOD PRESSURE: 87 MMHG | WEIGHT: 192 LBS | OXYGEN SATURATION: 97 % | HEART RATE: 82 BPM | SYSTOLIC BLOOD PRESSURE: 143 MMHG

## 2024-06-11 DIAGNOSIS — R21 RASH AND NONSPECIFIC SKIN ERUPTION: ICD-10-CM

## 2024-06-11 DIAGNOSIS — G35 MULTIPLE SCLEROSIS (HCC): ICD-10-CM

## 2024-06-11 DIAGNOSIS — R31.21 ASYMPTOMATIC MICROSCOPIC HEMATURIA: ICD-10-CM

## 2024-06-11 DIAGNOSIS — G35 MULTIPLE SCLEROSIS (HCC): Primary | ICD-10-CM

## 2024-06-11 DIAGNOSIS — R68.89 SENSATION OF HEAVINESS: ICD-10-CM

## 2024-06-11 DIAGNOSIS — G35 MULTIPLE SCLEROSIS EXACERBATION (HCC): Primary | ICD-10-CM

## 2024-06-11 DIAGNOSIS — G35 MULTIPLE SCLEROSIS EXACERBATION (HCC): ICD-10-CM

## 2024-06-11 DIAGNOSIS — R68.89 SENSATION OF HEAVINESS: Primary | ICD-10-CM

## 2024-06-11 LAB
ALBUMIN SERPL BCP-MCNC: 4.4 G/DL (ref 3.5–5)
ALP SERPL-CCNC: 54 U/L (ref 34–104)
ALT SERPL W P-5'-P-CCNC: 12 U/L (ref 7–52)
ANA SER QL IA: NEGATIVE
ANION GAP SERPL CALCULATED.3IONS-SCNC: 9 MMOL/L (ref 4–13)
APTT PPP: 33 SECONDS (ref 23–37)
AST SERPL W P-5'-P-CCNC: 14 U/L (ref 13–39)
B BURGDOR IGG+IGM SER QL IA: NEGATIVE
BASOPHILS # BLD AUTO: 0.05 THOUSANDS/ÂΜL (ref 0–0.1)
BASOPHILS NFR BLD AUTO: 1 % (ref 0–1)
BILIRUB SERPL-MCNC: 0.38 MG/DL (ref 0.2–1)
BILIRUB UR QL STRIP: NEGATIVE
BUN SERPL-MCNC: 11 MG/DL (ref 5–25)
CALCIUM SERPL-MCNC: 9.2 MG/DL (ref 8.4–10.2)
CHLORIDE SERPL-SCNC: 106 MMOL/L (ref 96–108)
CLARITY UR: CLEAR
CO2 SERPL-SCNC: 25 MMOL/L (ref 21–32)
COLOR UR: NORMAL
CREAT SERPL-MCNC: 0.8 MG/DL (ref 0.6–1.3)
CREAT UR-MCNC: 38 MG/DL
CRP SERPL QL: <1 MG/L
EOSINOPHIL # BLD AUTO: 0.17 THOUSAND/ÂΜL (ref 0–0.61)
EOSINOPHIL NFR BLD AUTO: 4 % (ref 0–6)
ERYTHROCYTE [DISTWIDTH] IN BLOOD BY AUTOMATED COUNT: 12.7 % (ref 11.6–15.1)
ERYTHROCYTE [SEDIMENTATION RATE] IN BLOOD: 7 MM/HOUR (ref 0–19)
GFR SERPL CREATININE-BSD FRML MDRD: 97 ML/MIN/1.73SQ M
GLUCOSE P FAST SERPL-MCNC: 89 MG/DL (ref 65–99)
GLUCOSE UR STRIP-MCNC: NEGATIVE MG/DL
HCT VFR BLD AUTO: 42.9 % (ref 34.8–46.1)
HGB BLD-MCNC: 14.7 G/DL (ref 11.5–15.4)
HGB UR QL STRIP.AUTO: NEGATIVE
IMM GRANULOCYTES # BLD AUTO: 0.01 THOUSAND/UL (ref 0–0.2)
IMM GRANULOCYTES NFR BLD AUTO: 0 % (ref 0–2)
KETONES UR STRIP-MCNC: NEGATIVE MG/DL
LEUKOCYTE ESTERASE UR QL STRIP: NEGATIVE
LYMPHOCYTES # BLD AUTO: 1.44 THOUSANDS/ÂΜL (ref 0.6–4.47)
LYMPHOCYTES NFR BLD AUTO: 33 % (ref 14–44)
MCH RBC QN AUTO: 31.9 PG (ref 26.8–34.3)
MCHC RBC AUTO-ENTMCNC: 34.3 G/DL (ref 31.4–37.4)
MCV RBC AUTO: 93 FL (ref 82–98)
MICROALBUMIN UR-MCNC: <7 MG/L
MICROALBUMIN/CREAT 24H UR: <18 MG/G CREATININE (ref 0–30)
MONOCYTES # BLD AUTO: 0.41 THOUSAND/ÂΜL (ref 0.17–1.22)
MONOCYTES NFR BLD AUTO: 9 % (ref 4–12)
NEUTROPHILS # BLD AUTO: 2.27 THOUSANDS/ÂΜL (ref 1.85–7.62)
NEUTS SEG NFR BLD AUTO: 53 % (ref 43–75)
NITRITE UR QL STRIP: NEGATIVE
NRBC BLD AUTO-RTO: 0 /100 WBCS
PH UR STRIP.AUTO: 7.5 [PH]
PLATELET # BLD AUTO: 221 THOUSANDS/UL (ref 149–390)
PMV BLD AUTO: 11.2 FL (ref 8.9–12.7)
POTASSIUM SERPL-SCNC: 3.8 MMOL/L (ref 3.5–5.3)
PROT SERPL-MCNC: 6.5 G/DL (ref 6.4–8.4)
PROT UR STRIP-MCNC: NEGATIVE MG/DL
RBC # BLD AUTO: 4.61 MILLION/UL (ref 3.81–5.12)
SODIUM SERPL-SCNC: 140 MMOL/L (ref 135–147)
SP GR UR STRIP.AUTO: 1.01 (ref 1–1.03)
UROBILINOGEN UR STRIP-ACNC: <2 MG/DL
WBC # BLD AUTO: 4.35 THOUSAND/UL (ref 4.31–10.16)

## 2024-06-11 PROCEDURE — 86225 DNA ANTIBODY NATIVE: CPT

## 2024-06-11 PROCEDURE — 82570 ASSAY OF URINE CREATININE: CPT

## 2024-06-11 PROCEDURE — 81003 URINALYSIS AUTO W/O SCOPE: CPT | Performed by: PSYCHIATRY & NEUROLOGY

## 2024-06-11 PROCEDURE — 85025 COMPLETE CBC W/AUTO DIFF WBC: CPT

## 2024-06-11 PROCEDURE — 71046 X-RAY EXAM CHEST 2 VIEWS: CPT

## 2024-06-11 PROCEDURE — 36415 COLL VENOUS BLD VENIPUNCTURE: CPT

## 2024-06-11 PROCEDURE — 86140 C-REACTIVE PROTEIN: CPT

## 2024-06-11 PROCEDURE — 85730 THROMBOPLASTIN TIME PARTIAL: CPT

## 2024-06-11 PROCEDURE — 99214 OFFICE O/P EST MOD 30 MIN: CPT | Performed by: NURSE PRACTITIONER

## 2024-06-11 PROCEDURE — 80053 COMPREHEN METABOLIC PANEL: CPT

## 2024-06-11 PROCEDURE — 86038 ANTINUCLEAR ANTIBODIES: CPT

## 2024-06-11 PROCEDURE — 93000 ELECTROCARDIOGRAM COMPLETE: CPT | Performed by: NURSE PRACTITIONER

## 2024-06-11 PROCEDURE — 82043 UR ALBUMIN QUANTITATIVE: CPT

## 2024-06-11 PROCEDURE — 86618 LYME DISEASE ANTIBODY: CPT

## 2024-06-11 PROCEDURE — 85652 RBC SED RATE AUTOMATED: CPT

## 2024-06-11 RX ORDER — SODIUM CHLORIDE 9 MG/ML
20 INJECTION, SOLUTION INTRAVENOUS ONCE
Status: CANCELLED | OUTPATIENT
Start: 2024-06-12

## 2024-06-11 NOTE — LETTER
June 11, 2024     Patient: Susan Villanueva  YOB: 1990  Date of Visit: 6/11/2024      To Whom it May Concern:    Susan Villanueva is under my professional care. Susan was seen in my office on 6/11/2024. Susan may return to work on 6/12/2024 .    If you have any questions or concerns, please don't hesitate to call.         Sincerely,          WINSOME Luu        CC: No Recipients

## 2024-06-11 NOTE — PROGRESS NOTES
marekAmbulatory Visit  Name: Susan Villanueva      : 1990      MRN: [id number]  Encounter Provider: WINSOME Luu  Encounter Date: 2024   Encounter department: [facility]    Assessment & Plan   1. Sensation of heaviness  Assessment & Plan:  IH EKG showing a NSR   Orders:  -     POCT ECG  -     XR chest pa & lateral; Future; Expected date: 2024  2. Multiple sclerosis (HCC)  Assessment & Plan:  Patient having numbness in her left face and left fingers is following up with neurology today to get her MRI of the head and c-spine completed   Orders:  -     CBC and differential; Future  -     APTT; Future  -     Comprehensive metabolic panel; Future  -     Sedimentation rate, automated; Future  -     C-reactive protein; Future  -     Albumin / creatinine urine ratio; Future  3. Rash and nonspecific skin eruption  Assessment & Plan:  Patient with circular rash located on arms and behind knees concerns for possible SLE vs lyme will order labs to update  Orders:  -     CBC and differential; Future  -     Lyme Total AB W Reflex to IGM/IGG; Future  -     CECIL Screen w/ Reflex to Titer/Pattern; Future  -     Anti-DNA antibody, double-stranded; Future  4. Asymptomatic microscopic hematuria  -     UA (URINE) with reflex to Scope; Future  -     Albumin / creatinine urine ratio; Future         History of Present Illness   {Disappearing Hyperlinks I Encounters * My Last Note * Since Last Visit * History :07346}  Rash  Numbness (Since Wednesday, started in face, reached out to Neuro and advised to follow up with us here. Now has numbness in tips of fingers and thumb on left hand. )  Heaviness (Patient reports a heaviness all over as if she has a lead suit on.)  She is having blood in her urine microscopic. She is concerns about SLE vs the MS     2024  Patient here today and reports that she started a few weeks ago with a circular red raised patches that are very itchy and  inflamed at times and showing up in multiples located on the arms and backs of legs at times inner thighs sometimes feeling like they are burning at this point not doing anything for them at this point tried cortisone topical was not effective. At the same time this was going on last week she started with numbness in her left side of lower face having numbness and also having increasing headaches and migraines. She has been in touch with neurology and has scans ordered and plans to do them stat.       Review of Systems   Constitutional:  Positive for fatigue. Negative for activity change, appetite change, chills, diaphoresis, fever and unexpected weight change.   HENT:  Negative for congestion, ear pain, hearing loss, postnasal drip, sinus pressure, sinus pain, sneezing and sore throat.    Eyes:  Negative for pain, redness and visual disturbance.   Respiratory:  Negative for cough and shortness of breath.    Cardiovascular:  Negative for chest pain and leg swelling.   Gastrointestinal:  Negative for abdominal pain, diarrhea, nausea and vomiting.   Endocrine: Negative.    Genitourinary: Negative.    Musculoskeletal:  Negative for arthralgias.   Skin:  Positive for rash.   Allergic/Immunologic: Negative.    Neurological:  Positive for numbness and headaches. Negative for dizziness and light-headedness.   Hematological: Negative.    Psychiatric/Behavioral:  Negative for behavioral problems and dysphoric mood.      Past Medical History:   Diagnosis Date   • Abnormal Pap smear of cervix 2015   • Anxiety    • Depression    • Multiple sclerosis (HCC)    • Opioid abuse, in remission (HCC)    • Polycystic ovary syndrome    • Psychiatric disorder    • Seasonal allergies    • Substance abuse (HCC)      No past surgical history on file.  Family History   Problem Relation Age of Onset   • Hypertension Mother    • Anxiety disorder Mother    • Deep vein thrombosis Father    • Pulmonary embolism Father    • Cancer Paternal  Grandmother      Social History     Tobacco Use   • Smoking status: Former     Average packs/day: 1 pack/day for 7.0 years (7.0 ttl pk-yrs)     Types: Cigarettes     Start date: 2017   • Smokeless tobacco: Current   Vaping Use   • Vaping status: Every Day   • Substances: Nicotine   Substance and Sexual Activity   • Alcohol use: Not Currently   • Drug use: Not Currently     Types: Heroin, Oxycodone   • Sexual activity: Yes     Partners: Male     Birth control/protection: Condom Male, OCP     Current Outpatient Medications on File Prior to Visit   Medication Sig   • BACILLUS COAGULANS-INULIN PO Take by mouth   • cholecalciferol (VITAMIN D3) 400 units tablet Take 400 Units by mouth daily   • DULoxetine (CYMBALTA) 30 mg delayed release capsule Take 1 capsule (30 mg total) by mouth daily   • hydrocortisone (ANUSOL-HC) 2.5 % rectal cream Apply topically 2 (two) times a day for 14 days   • KRILL OIL PO Take by mouth   • LORazepam (Ativan) 0.5 mg tablet Take 1 tablet (0.5 mg total) by mouth as needed for anxiety As the patient is claustrophobic I would recommend; one tablet of Lorazepam 15 minutes before going into the MRI tube and then 1 tablet just before going into the MRI tube.   • Magnesium 400 MG CAPS Take by mouth   • Multiple Vitamin (MULTI VITAMIN DAILY PO) Take by mouth   • norethindrone (Jencycla) 0.35 MG tablet Take 1 tablet (0.35 mg total) by mouth daily   • Probiotic Product (PROBIOTIC-10 PO) Take by mouth   • Turmeric 500 MG CAPS Take by mouth     Allergies   Allergen Reactions   • Iodine - Food Allergy Hives   • Shrimp Flavor - Food Allergy Hives   • Lovenox [Enoxaparin] Rash     Immunization History   Administered Date(s) Administered   • Meningococcal MCV4, Unspecified 09/03/2009   • Tdap 05/17/2021     Objective   {Disappearing Hyperlinks   Review Vitals * Enter New Vitals * Results Review * Labs * Imaging * Cardiology * Procedures * Lung Cancer Screening :38606}  /87   Pulse 82   Temp (!) 97.1  "°F (36.2 °C)   Ht 5' 5\" (1.651 m)   Wt 87.1 kg (192 lb)   SpO2 97%   BMI 31.95 kg/m²     Physical Exam  Vitals and nursing note reviewed.   Constitutional:       General: She is not in acute distress.     Appearance: She is well-developed.   HENT:      Head: Normocephalic and atraumatic.     Eyes:      Pupils: Pupils are equal, round, and reactive to light.   Neck:      Thyroid: No thyromegaly.   Cardiovascular:      Rate and Rhythm: Normal rate and regular rhythm.      Heart sounds: Normal heart sounds. No murmur heard.  Pulmonary:      Effort: Pulmonary effort is normal. No respiratory distress.      Breath sounds: Normal breath sounds. No wheezing.   Abdominal:      General: Bowel sounds are normal.      Palpations: Abdomen is soft.   Musculoskeletal:         General: Normal range of motion.        Arms:       Cervical back: Normal range of motion.   Skin:     General: Skin is warm and dry.   Neurological:      Mental Status: She is alert and oriented to person, place, and time.      Sensory: Sensory deficit present.               Administrative Statements {Disappearing Hyperlinks I  [facility]}        "

## 2024-06-11 NOTE — TELEPHONE ENCOUNTER
Pt has coverage with [employer]. Per website, Solumedrol , 08159, and [address] do not require PA.     Pt is closest to [facility].     Therapy plan entered and sent for signature.     Called and Left a message on pt's answering machine for a call back    Narratot message sent.

## 2024-06-11 NOTE — TELEPHONE ENCOUNTER
New MRI brain and C spine ordered with stat priority.       I called Ms. Villanueva on their phone number listed in EPIC at 10:35 AM.     No one picked up the phone, I left voice mail.       Would start her on 3 days of IVMP.     @Team, can you help me schedule her for 3 days of IVMP please?       Thank you,     Dr. Harsh MD.   06/11/24   10:35 AM

## 2024-06-11 NOTE — ASSESSMENT & PLAN NOTE
Patient with circular rash located on arms and behind knees concerns for possible SLE vs lyme will order labs to update

## 2024-06-11 NOTE — TELEPHONE ENCOUNTER
"Abdulaziz'alok  6/11/24 12:40 PM:    \"Susan Alstonen left a voice mail for me. Im just returning her call. Thank you.    # [phone]  -----------------------------  "

## 2024-06-11 NOTE — TELEPHONE ENCOUNTER
Pt has scheduled MRIs for 6/12/24 3:45pm and 6/13/24 3:15pm.    Solumedrol therapy plan signed.     Called [facility] infusion center and spoke with Vandana.   Tomorrow 6/12/24 @ 2pm (only time available)  Thursday 6/13/24 @ 4pm (only time available)  Friday 6/14/24 @ 11:30am (only time available)    Pt made aware of above. She would prefer to keep MRIs as scheduled- notes difficulty in finding appts for this. Pt would be agreeable to [place], [place], and [place] infusion centers.    Called [facility] infusion center and spoke with Mark. They do not have any availability this week.     Called [facility] infusion center and spoke with Mary. They do not have any availability this week.     Called [facility] infusion center and spoke with Zenobia.   6/12/24 @ 1pm ([place])  6/13/24 @ 12pm ([place])    6/14/24 @ 11:30am ([place])    [facility] message sent to pt with all details.     Called pt, made her aware. She is agreeable.

## 2024-06-11 NOTE — ASSESSMENT & PLAN NOTE
Patient having numbness in her left face and left fingers is following up with neurology today to get her MRI of the head and c-spine completed

## 2024-06-12 ENCOUNTER — HOSPITAL ENCOUNTER (OUTPATIENT)
Dept: MRI IMAGING | Facility: HOSPITAL | Age: 34
Discharge: HOME | End: 2024-06-12
Attending: PSYCHIATRY & NEUROLOGY
Payer: COMMERCIAL

## 2024-06-12 ENCOUNTER — HOSPITAL ENCOUNTER (OUTPATIENT)
Dept: INFUSION CENTER | Facility: HOSPITAL | Age: 34
Discharge: HOME | End: 2024-06-12
Attending: PSYCHIATRY & NEUROLOGY

## 2024-06-12 ENCOUNTER — HOSPITAL ENCOUNTER (OUTPATIENT)
Dept: INFUSION CENTER | Facility: HOSPITAL | Age: 34
Discharge: HOME | End: 2024-06-12
Attending: PSYCHIATRY & NEUROLOGY
Payer: COMMERCIAL

## 2024-06-12 VITALS
TEMPERATURE: 98.5 F | OXYGEN SATURATION: 98 % | DIASTOLIC BLOOD PRESSURE: 87 MMHG | SYSTOLIC BLOOD PRESSURE: 132 MMHG | RESPIRATION RATE: 16 BRPM | HEART RATE: 75 BPM

## 2024-06-12 DIAGNOSIS — G35 MULTIPLE SCLEROSIS (HCC): Primary | ICD-10-CM

## 2024-06-12 DIAGNOSIS — G35 MULTIPLE SCLEROSIS EXACERBATION (HCC): ICD-10-CM

## 2024-06-12 LAB — DSDNA AB SER-ACNC: <1 IU/ML (ref 0–9)

## 2024-06-12 PROCEDURE — A9585 GADOBUTROL INJECTION: HCPCS | Performed by: PSYCHIATRY & NEUROLOGY

## 2024-06-12 PROCEDURE — 72156 MRI NECK SPINE W/O & W/DYE: CPT

## 2024-06-12 PROCEDURE — 96365 THER/PROPH/DIAG IV INF INIT: CPT

## 2024-06-12 RX ORDER — SODIUM CHLORIDE 9 MG/ML
20 INJECTION, SOLUTION INTRAVENOUS ONCE
Status: COMPLETED | OUTPATIENT
Start: 2024-06-12 | End: 2024-06-12

## 2024-06-12 RX ORDER — GADOBUTROL 604.72 MG/ML
8 INJECTION INTRAVENOUS
Status: COMPLETED | OUTPATIENT
Start: 2024-06-12 | End: 2024-06-12

## 2024-06-12 RX ORDER — SODIUM CHLORIDE 9 MG/ML
20 INJECTION, SOLUTION INTRAVENOUS ONCE
Status: CANCELLED | OUTPATIENT
Start: 2024-06-13

## 2024-06-12 RX ADMIN — GADOBUTROL 8 ML: 604.72 INJECTION INTRAVENOUS at 16:15

## 2024-06-12 RX ADMIN — SODIUM CHLORIDE 1000 MG: 0.9 INJECTION, SOLUTION INTRAVENOUS at 13:31

## 2024-06-12 RX ADMIN — SODIUM CHLORIDE 20 ML/HR: 0.9 INJECTION, SOLUTION INTRAVENOUS at 13:01

## 2024-06-12 NOTE — PROGRESS NOTES
Susan Villanueva  tolerated Solumedrol treatment well with no complications.      Susan Villanueva is aware of future appt on 6/13 at 12PM.     AVS printed and given to Susan Villanueva: No (Declined by Susan Villanueva).

## 2024-06-13 ENCOUNTER — TELEPHONE (OUTPATIENT)
Age: 34
End: 2024-06-13

## 2024-06-13 ENCOUNTER — TELEPHONE (OUTPATIENT)
Dept: NEUROLOGY | Facility: CLINIC | Age: 34
End: 2024-06-13

## 2024-06-13 ENCOUNTER — HOSPITAL ENCOUNTER (OUTPATIENT)
Dept: RADIOLOGY | Age: 34
Discharge: HOME | End: 2024-06-13
Attending: PSYCHIATRY & NEUROLOGY
Payer: COMMERCIAL

## 2024-06-13 ENCOUNTER — HOSPITAL ENCOUNTER (OUTPATIENT)
Dept: INFUSION CENTER | Facility: HOSPITAL | Age: 34
End: 2024-06-13
Attending: PSYCHIATRY & NEUROLOGY
Payer: COMMERCIAL

## 2024-06-13 VITALS
RESPIRATION RATE: 16 BRPM | SYSTOLIC BLOOD PRESSURE: 140 MMHG | TEMPERATURE: 99 F | DIASTOLIC BLOOD PRESSURE: 77 MMHG | OXYGEN SATURATION: 98 % | HEART RATE: 84 BPM

## 2024-06-13 DIAGNOSIS — G35 MULTIPLE SCLEROSIS EXACERBATION (HCC): ICD-10-CM

## 2024-06-13 DIAGNOSIS — G35 MULTIPLE SCLEROSIS (HCC): Primary | ICD-10-CM

## 2024-06-13 PROCEDURE — 70553 MRI BRAIN STEM W/O & W/DYE: CPT

## 2024-06-13 PROCEDURE — A9585 GADOBUTROL INJECTION: HCPCS | Performed by: PSYCHIATRY & NEUROLOGY

## 2024-06-13 PROCEDURE — 96365 THER/PROPH/DIAG IV INF INIT: CPT

## 2024-06-13 RX ORDER — SODIUM CHLORIDE 9 MG/ML
20 INJECTION, SOLUTION INTRAVENOUS ONCE
Status: CANCELLED | OUTPATIENT
Start: 2024-06-14

## 2024-06-13 RX ORDER — SODIUM CHLORIDE 9 MG/ML
20 INJECTION, SOLUTION INTRAVENOUS ONCE
Status: COMPLETED | OUTPATIENT
Start: 2024-06-13 | End: 2024-06-13

## 2024-06-13 RX ORDER — GADOBUTROL 604.72 MG/ML
9 INJECTION INTRAVENOUS
Status: COMPLETED | OUTPATIENT
Start: 2024-06-13 | End: 2024-06-13

## 2024-06-13 RX ADMIN — SODIUM CHLORIDE 1000 MG: 0.9 INJECTION, SOLUTION INTRAVENOUS at 12:53

## 2024-06-13 RX ADMIN — GADOBUTROL 9 ML: 604.72 INJECTION INTRAVENOUS at 15:39

## 2024-06-13 RX ADMIN — SODIUM CHLORIDE 20 ML/HR: 0.9 INJECTION, SOLUTION INTRAVENOUS at 12:34

## 2024-06-13 NOTE — PROGRESS NOTES
Susan Villanueva  tolerated IV solu-medrol well with no complications.      Susan Villanueva is aware of future appt on 6-14-24 at 900    AVS declined by Susan Villanueva

## 2024-06-13 NOTE — TELEPHONE ENCOUNTER
TELEPHONE ENCOUNTER:      I called Ms. Villanueva on their phone number listed in EPIC at 11:24 AM.       I updated the patient about the test results of her MRI of the cervical spine which were showing 2 new lesions without any enhancement.  Patient is already undergoing 3 days of IV Solu-Medrol for her new symptoms.  I am also reaching out to our team to schedule her for a follow-up visit.  I have also sent her information about the Ocrevus and the Kesimpta.     All questions were answered, patient expressed understanding.       Meet House MD.   Staff Neurologist  06/13/24   11:24 AM

## 2024-06-13 NOTE — TELEPHONE ENCOUNTER
WINSOME Pelaez  P Maternal Fetal Med Pod Clerical  Schedule a virtual visit or face-to-face preconception visit with [facility] physician.  Thanks,  Corla

## 2024-06-14 ENCOUNTER — TELEPHONE (OUTPATIENT)
Age: 34
End: 2024-06-14

## 2024-06-14 ENCOUNTER — HOSPITAL ENCOUNTER (OUTPATIENT)
Dept: INFUSION CENTER | Facility: HOSPITAL | Age: 34
End: 2024-06-14
Attending: PSYCHIATRY & NEUROLOGY
Payer: COMMERCIAL

## 2024-06-14 DIAGNOSIS — G35 MULTIPLE SCLEROSIS (HCC): Primary | ICD-10-CM

## 2024-06-14 PROCEDURE — 96365 THER/PROPH/DIAG IV INF INIT: CPT

## 2024-06-14 RX ORDER — SODIUM CHLORIDE 9 MG/ML
20 INJECTION, SOLUTION INTRAVENOUS ONCE
Status: CANCELLED | OUTPATIENT
Start: 2024-06-14

## 2024-06-14 RX ORDER — SODIUM CHLORIDE 9 MG/ML
20 INJECTION, SOLUTION INTRAVENOUS ONCE
Status: COMPLETED | OUTPATIENT
Start: 2024-06-14 | End: 2024-06-14

## 2024-06-14 RX ADMIN — SODIUM CHLORIDE 1000 MG: 0.9 INJECTION, SOLUTION INTRAVENOUS at 09:36

## 2024-06-14 RX ADMIN — SODIUM CHLORIDE 20 ML/HR: 0.9 INJECTION, SOLUTION INTRAVENOUS at 09:35

## 2024-06-14 NOTE — PROGRESS NOTES
Susan Villanueva  tolerated iv solumedril iv treatment well with no complications.      Susan Villanueva is aware of future appt with neurology as she has completed therapy with us .     AVS printed and given to Susan Villanueva:  No (Declined by Susan Villanueva)

## 2024-06-14 NOTE — TELEPHONE ENCOUNTER
Called pt left voicemail to schedule appointment with  per his request. Dates on hold 6/19/24-6/20/24. Left call back number at [place] location.

## 2024-06-17 ENCOUNTER — TELEPHONE (OUTPATIENT)
Dept: FAMILY MEDICINE CLINIC | Facility: CLINIC | Age: 34
End: 2024-06-17

## 2024-06-17 LAB
GALACTOMANNAN AG SERPL IA-ACNC: 0.09
JCPYV AB SERPL QL IA: NEGATIVE
JCPYV AB SERPL QL IA: NORMAL
SPECIMEN STATUS: NORMAL

## 2024-06-19 ENCOUNTER — TELEPHONE (OUTPATIENT)
Age: 34
End: 2024-06-19

## 2024-06-20 ENCOUNTER — OFFICE VISIT (OUTPATIENT)
Age: 34
End: 2024-06-20
Payer: COMMERCIAL

## 2024-06-20 ENCOUNTER — TELEPHONE (OUTPATIENT)
Dept: NEUROLOGY | Facility: CLINIC | Age: 34
End: 2024-06-20

## 2024-06-20 VITALS
HEIGHT: 65 IN | DIASTOLIC BLOOD PRESSURE: 80 MMHG | OXYGEN SATURATION: 99 % | WEIGHT: 191 LBS | HEART RATE: 90 BPM | BODY MASS INDEX: 31.82 KG/M2 | SYSTOLIC BLOOD PRESSURE: 102 MMHG

## 2024-06-20 DIAGNOSIS — G35 RELAPSING REMITTING MULTIPLE SCLEROSIS (HCC): Primary | ICD-10-CM

## 2024-06-20 DIAGNOSIS — Z76.89 ENCOUNTER BEFORE STARTING MEDICATION: ICD-10-CM

## 2024-06-20 DIAGNOSIS — F33.42 RECURRENT MAJOR DEPRESSIVE DISORDER, IN FULL REMISSION (HCC): ICD-10-CM

## 2024-06-20 PROCEDURE — 99215 OFFICE O/P EST HI 40 MIN: CPT | Performed by: PSYCHIATRY & NEUROLOGY

## 2024-06-20 RX ORDER — DULOXETIN HYDROCHLORIDE 30 MG/1
30 CAPSULE, DELAYED RELEASE ORAL 2 TIMES DAILY
Qty: 180 CAPSULE | Refills: 3 | Status: SHIPPED | OUTPATIENT
Start: 2024-06-20 | End: 2025-06-20

## 2024-06-20 RX ORDER — ALPRAZOLAM 0.25 MG/1
0.25 TABLET ORAL
Qty: 15 TABLET | Refills: 0 | Status: SHIPPED | OUTPATIENT
Start: 2024-06-20

## 2024-06-20 NOTE — TELEPHONE ENCOUNTER
----- Message from Meet House MD sent at 6/20/2024  1:07 PM EDT -----  Regarding: new 5 days of IV steroids  [address],      I saw Ms. Villanueva in the clinic and I feel that she might be getting into another relapse versus persistent symptoms from her previous relapse.  I would like to get another 5 days of IV Solu-Medrol.  She works 9-5 and was requesting if she can do home infusion.  I am not sure how that would work out with her insurance      She also made up a decision to start the Ocrevus and she already has all the labs completed as well.  Can we start the process please?      Thank you,      Dr. Harsh MD.   06/20/24   1:09 PM

## 2024-06-20 NOTE — PROGRESS NOTES
"  NEUROLOGY OUTPATIENT - FOLLOW UP PATIENT VISIT NOTE        NAME: Susan Villanueva  MRN: [id number]  : 1990     TODAY'S DATE: 24         HISTORY OF PRESENT ILLNESS (HPI):    Ms. Villanueva is a 33 y.o. female presenting with Multiple Sclerosis    MS HISTORY:  Symptoms onset:   Initial symptoms: Left-sided facial numbness (Dr. Camacho) [facility]  MS diagnosis: 2019  Disease course at onset: Relapsing remitting Multiple Sclerosis   Current disease course: Relapsing remitting Multiple Sclerosis   Subsequent symptoms:    2023-dizziness/vertiginous symptoms s/p 5 days of IVMP.    6/10/2024-numbness and tingling in the left upper and lower left, hands   Family history of MS: No  Spinal tap: positive--11 bands, 0 in the serum. (Positive) protein 40 and CSF, WBC count 2, RBC count 5, and CSF glucose was 64   Prior DMT's for MS: Copaxone (stopped secondary to the side effects)  Current DMT's for MS: none  JCV test: 0.15 negative   [facility]  MOG Ab:not tested  Last MRI of the brain:     2024-new enhancing lesions noted  2023-new enhancing lesion noted.   Last MRI of the C spine:   2023-DDD, no lesions noted.   2024-new*abnormality noted  Last MRI of the T spine:2023-DDD, no lesions noted.           INTERIM HISTORY:  Ms. Villanueva is coming in for an urgent appointment after recent MS exacerbation consisting of numbness on the left side of her face along with her upper extremity.  She also underwent MRI of the brain and cervical spine which were showing new enhancing lesions for which she underwent 3 days of IV Solu-Medrol but she feels that her symptoms which initially improved are starting to get worse again      In regards to her sensory symptoms she mentions that she feels that the left side of her face is having \" cold drooling episodes\" like someone has given her Novocain.  She is also feeling a lot of heaviness in her body and even a short amount " of exertion would cause her symptoms to get worse.  She is also complaining of brain fog and increased anxiety and worsening fatigue.    It seems that the most recent relapses take in her financial and emotional toll on her.  She had to pay a lot of money for the MRIs, $300 co-pay as well as paying the doctor's co-pay.      MS ROS:   Sensory symptoms (numbness, tingling and paresthesia): New onset of paresthesias/numbness around the left side of her face --previously at her baseline she had some periorbital numbness that would be intermittent  Weakness: She does have increased heaviness in her extremities.   Spasms: Some joint and ankle and spasms  Vision problems (loss of vision or double vision): On Tuesday she had increased blurred vision.   Ambulatory dysfunction: Increased heaviness all over her body affecting her ambulation   Vertigo and Dizziness: She has some complaints, popping sounds. Vertigo.    Cognitive complaints: Brain fog since the last clinic relapse which is affecting her work   Speech complaints: Sometimes she would have word finding difficulties but nothing significant  Depression: No new symptoms reported   Anxiety: More anxiety since her clinical relapse.   Fatigue: Fatigue seems to be worse since her most recent clinical relapse  Bladder symptoms: No new complaints  Bowel symptoms: Some constipation  Lhermitte's sign: ++   Uhthoff's phenomenon: ++   MS hugs like symptoms:  a few episodes in the past    PRIOR NOTES:  Since last visit, Ms. Villanueva reports that their symptoms are stable.  During her last visit it was discussed that she needs to be on a disease modifying therapy and  we discussed about Ocrevus, rituximab and Kesimpta but due to the fact that the patient wanted to get pregnant we decided to delay the start of the medication    Since last clinic visit, Ms. Villanueva denies any new focal neurologic symptoms suggestive of inflammatory disease activity. Specifically patient denies any  episodes lasting greater than 24 hours of painful vision loss, double vision, oscillopsia, slurred speech, vertigo, incoordination, focal weakness, bowel or bladder incontinence, focal sensory loss, or Lhermitte's.     Progression: stable    She is mentioning that she is having once a week headaches that usually happen at the very middle part of the brain.  These headaches usually go away by the time she is sleeps and wake up the next morning but only in 1 episode the headaches lasted for about a day.  She mentioned that there is some light sensitivity along with some sensitivity, some nausea but no vomiting.  She was initially thinking that these headaches might be triggered by caffeine withdrawal but she has not been able to find a trigger for these headaches      MS ROS:   Sensory symptoms (numbness, tingling and paresthesia): At baseline she has some numbness in the left perioral region which is intermittent and usually goes away within a few minutes  Weakness: No new symptoms reported   Spasms: No new symptoms reported   Vision problems (loss of vision or double vision): No new symptoms reported (except with her headaches)  Ambulatory dysfunction: No new symptoms reported   Vertigo and Dizziness: No new symptoms reported   Cognitive complaints: No new complaints  Speech complaints: Sometimes she would have word finding difficulties but nothing significant  Depression: No new symptoms reported   Anxiety: No new symptoms reported   Fatigue: Fatigue is present at baseline and some days she has to sleep at least 8 hours to continue working during the day  Bladder symptoms: No new complaints  Bowel symptoms: Some constipation  Lhermitte's sign: ++   Uhthoff's phenomenon: ++   MS hugs like symptoms:  a few episodes in the past        12/5/2023-presented to the ER with 1 week history of intermittent vertigo for which she underwent MRI of the brain which was showing enhancing lesions.  Previously she was diagnosed with  "multiple sclerosis when she presented with left-sided face numbness and was diagnosed based on the MRI findings and positive spinal tap.  She was initially put on Copaxone but had side effects.   MS ROS:   Sensory symptoms (numbness, tingling and paresthesia):  She does have some numbness in the left side of the lip, which comes and goes.   Weakness: No new symptoms reported   Spasms: No new symptoms reported   Vision problems (loss of vision or double vision): No new symptoms reported   Ambulatory dysfunction: No new symptoms reported   Vertigo and Dizziness: No new symptoms reported   Cognitive complaints: Improved since the steroids   Speech complaints: Improved since the steroids.    Depression: No new symptoms reported   Anxiety: No new symptoms reported   Fatigue: Much better since the hospital --no day time naps  Bladder symptoms: last 6 months, she is having increased urgency. 3-5 accidents.   Bowel symptoms: No new symptoms reported   Lhermitte's sign: ++   Uhthoff's phenomenon: ++   MS hugs like symptoms:  a few episodes in the past    CURRENT OUTPATIENT MEDICATIONS:    Susan Villanueva has a current medication list which includes the following prescription(s): cholecalciferol, duloxetine, krill oil, magnesium, multiple vitamin, norethindrone, probiotic product, turmeric, bacillus coagulans-inulin, hydrocortisone, and lorazepam.       PHYSICAL EXAMINATION:   VITAL SIGNS:  height is 5' 5\" (1.651 m) and weight is 86.6 kg (191 lb). Her blood pressure is 102/80 and her pulse is 90. Her oxygen saturation is 99%.        NEUROLOGICAL EXAMINATION:    MENTAL STATUS EXAM: Alert, oriented to time place and person,     CRANIAL NERVE EXAMINATION:  I Not tested   II Normal visual fields to finger counting.   II, Ill,  IV, VI Pupils were symmetric, briskly reactive. No afferent pupillary defect.  Eye movements are full without nystagmus. No ptosis.   V Facial sensation decreased in the left V2 to V3 region   VII Facial " "movements were symmetrical    VIII Hearing was intact   IX, X Intact   XI Shoulder shrug and head turn was normal   XII Tongue protrusion is in the mid line.          MOTOR EXAM:        Arm Right  Left Leg Right  Left   Deltoid 5/5 5/5 lliopsoas 5/5 5/5   Biceps 5/5 5/5 Quads 5/5 5/5   Triceps 5/5 5/5 Hamstrings 5/5 5/5   Wrist Extension 5/5 5/5 Ankle Dorsi Flexion 5/5 5/5   Wrist Flexion 5/5 5/5 Ankle Plantar Flexion 5/5 5/5               DEEP TENDON REFLEXES:     Brachioradialis Biceps Triceps Patellar Achilles   Right 2+ 3+ 3+ 3+ 3+   Left 3+ 2+ 3+ 3+ 3+            SENSORY EXAMINATION:   Sensation to dull touch decreased in the left foot     COORDINATION:   normal finger to nose testing     GAIT/STATION:   normal        DIAGNOSTIC STUDIES:   PERTINENT LABS:     Lab Results   Component Value Date    WBC 4.35 06/11/2024     Lab Results   Component Value Date    HGB 14.7 06/11/2024     Lab Results   Component Value Date     06/11/2024     Lab Results   Component Value Date    LYMPHSABS 1.44 06/11/2024     No results found for: \"LABLYMP\"  Lab Results   Component Value Date    EOSABS 0.17 06/11/2024     No components found for: \"NEUTROPHILS\"    No results found for: \"LABMONO\"         No results found for: \"LABGLUC\"  Lab Results   Component Value Date    CREATININE 0.80 06/11/2024     Lab Results   Component Value Date    AST 14 06/11/2024     Lab Results   Component Value Date    ALT 12 06/11/2024     Lab Results   Component Value Date    ALKPHOS 54 06/11/2024     Lab Results   Component Value Date    AST 14 06/11/2024         Lab Results   Component Value Date    TSH 1.79 09/02/2020    HEPBSAG Non-reactive 06/08/2024    HEPBSAB 10.70 06/08/2024    HEPCAB Non-reactive 06/08/2024            NEUROIMAGING:  Results for orders placed in visit on 12/04/23    MRI brain w wo contrast      MRI brain w wo contrast            Results for orders placed during the hospital encounter of 06/13/24    MRI brain MS wo and w " contrast    Impression  Compared to 11/30/2023:  Evidence of new activity in a chronic tiny left frontal lesion and new active right frontal and temporal demyelinating lesions.    Resolved findings of activity in left temporal and right frontoparietal and periatrial lesions.    Workstation performed: [id number]      Results for orders placed during the hospital encounter of 11/30/23    MRI brain MS wo and w contrast    Impression  Multiple periventricular, subcortical, and pericallosal white matter demyelinating lesions consistent with the patient's history of multiple sclerosis, moderate plaque burden. New enhancing lesions within the right frontoparietal, left temporal, right  periatrial, and right centrum semiovale white matter as detailed compatible with acute demyelination compared to the prior outside 1/22/2021 examination. The larger lesions demonstrate surrounding vasogenic edema. There are additional new subcentimeter  lesions without enhancement including a right pontine focus.    The study was marked in EPIC for immediate notification.        Workstation performed: [id number]       Results for orders placed during the hospital encounter of 06/12/24    MRI cervical spine w wo contrast    Impression  -New STIR signal abnormality within the dorsal column at C3-C4 and left lateral cord at C6-C7 suspicious for demyelinating plaque. No pathologic enhancement to suggest active demyelination.    -No significant spinal canal or foraminal narrowing.    The study was marked in EPIC for significant notification.      Workstation performed: [id number]      Results for orders placed during the hospital encounter of 12/05/23    MRI cervical spine w wo contrast    Impression  No evidence of cervical spinal cord demyelination.    No stenosis.          Workstation performed: [id number]      Results for orders placed during the hospital encounter of 12/05/23    MRI thoracic spine w wo contrast    Impression  No evidence of  thoracic cord demyelination.    Single small T9-T10 disc herniation causing minimal central canal narrowing.      Workstation performed: [id number]      She had MRI of the orbits and MRI of the brain with contrast. It shows enhancement in the optic nerves as well as enhancement also seen on right posterior frontal white matter area and they did notice hyperintensity signal involving prince and lesser extent to midbrain and pontomedullary junction. Please review official report for details.    She had MRI of the thoracic spine. It show a tiny focal area of probable demyelination involving the right anterolateral aspect of the thoracic cord. Please review official report for details.      MRI BRAIN W CONTRAST 10/2/2019  Narrative  MRI brain and orbits with and without contrast    History: Left-sided facial numbness. Episode of double vision.    Comparison: MRI brain/MRA head and neck    Technique: Multiplanar MRI of the brain with special attention to  the orbits was performed utilizing short and long TE sequences.  Scanning was performed prior to and following the administration of  8.5 cc of intravenous contrast material.    Report:    Demonstrated are periventricular/callosal septal interface focal  areas of T2 and FLAIR hyperintensity suspicious for multiple  sclerosis. Some of this abnormal signal involves the pontomedullary  junction, prince, and to a lesser extent the midbrain.    Cerebellar tonsils terminate above the foramen magnum. The  visualized intracranial, canalicular, and intraorbital portions of  the optic nerves are unremarkable in appearance. There is, however,  enhancement involving the distal infraorbital as well as within  portions of the optic nerves lesser extent findings are suspicious  for demyelination. The optic chiasm itself is unremarkable with no  abnormal enhancement. It should be noted that there is curvilinear  enhancement involving a single posterior frontal white matter lesion  in the  right centrum semiovale.    Flow voids are unremarkable. Dural venous sinuses are unremarkable.    There is minimal to moderate ethmoid air cell thickening. There is  minimal bilateral frontal sinus because of thickening.    Impression  Impression:    Multiple sclerosis with active demyelination including enhancement  of the optic nerves. Neuromyelitis spectrum disorder is less likely  in the absence of spinal cord findings.        ASSESSMENT AND PLAN:    Ms. Villanueva is a 33 y.o.female  presenting with multiple sclerosis follow-up and a recent clinical relapse (6/10/2024-associated with facial numbness and breakthrough radiological disease).Briefly patient was diagnosed with multiple sclerosis based on left-sided facial numbness after undergoing an MRI of the brain which was showing lesions consistent with MS and positive spinal tap and meets the 2017 Pfeiffer's criteria for relapsing remitting MS. Patient  has a past medical history of Abnormal Pap smear of cervix (2015), Anxiety, Depression, Multiple sclerosis (HCC), Opioid abuse, in remission (HCC), Polycystic ovary syndrome, Psychiatric disorder, Seasonal allergies, and Substance abuse ([facility])..  Neurological examination is concerning for decreased sensation in the left lower extremity including the foot, brisk reflexes while the MRI of the brain and the cervical spine was showing breakthrough radiological disease      Relapsing remitting multiple sclerosis  Active, nonprogressive phase of the disease    Would do another 5 days of IV steroids (staff message sent to Amla Rosa).   Will start the process of getting the Ocrevus approved from her insurance.    message sent regarding social health in regards to financial constraints    Supplements:   Vit D and other supplements as recommended. Written instructions were given. Vit D goal is above 40.   Exercise:   Regular exercise was recommended (At least 150 min a week)  Diet:   Mediterranean diet (  instructions were given)        No follow-ups on file.       The management plan was discussed in detail with the patient and all questions were answered.     Ms. Villanueva was encouraged to contact our office with any questions or concerns and to contact the clinic or go to the nearest emergency room if symptoms change or worsen.       I have spent a total of 49 min in reviewing and/or ordering tests, medications, or procedures, performing an examination or evaluation, reviewing pertinent history, counseling and educating the patient, referring and/or communicating with other health care professionals, documenting in the EMR and general coordination of care of Ms. Villanueva  today.           Meet House MD.   Staff Neurologist,   Neuroimmunology and Neuroinfectious disease  06/20/24     This report has been created through the use of voice recognition/text compilation software.  Typographical and content errors may occur with this process.  While efforts are made to detect and correct such errors, in some cases errors will persist.  For this reason, wording in this document should be considered in the proper context and not strictly verbatim.  If, when reviewing the document, an error is discovered, please let the office know at [phone]

## 2024-06-20 NOTE — LETTER
June 21, 2024     Patient: Susan Villanueva  YOB: 1990  Date of Visit: 6/20/2024      To Whom it May Concern:    Susan Villanueva is under my professional care. Susan was seen in my office on 6/20/2024. Please excuse her for from 6/12/2024-6/14/2024 as she was getting treatment for her underlying medical condition.     If you have any questions or concerns, please don't hesitate to call.         Sincerely,          Meet Fraser MD        CC: No Recipients

## 2024-06-20 NOTE — PROGRESS NOTES
Patient ID: Susan Villanueva is a 33 y.o. female.    Assessment/Plan:    No problem-specific Assessment & Plan notes found for this encounter.       {Assess/PlanSmartLinks:83055}       Subjective:    HPI    {[facility] HPI texts:68221}    {Common ambulatory SmartLinks:68786}         Objective:    Last menstrual period 06/01/2024.    Physical Exam    Neurological Exam    Gait    T25FW;8.51 Seconds.        ROS:    Review of Systems

## 2024-06-20 NOTE — TELEPHONE ENCOUNTER
Will f/u on Solumedrol infusions.     Ocrevus enrollment submitted on [employer] portal.     Labs complete.    Pt is closest to [facility].     Ocrevus PA submitted on [employer], pending auth # [id number]. Awaiting determination. Submitted for [facility] due to NPI/tax ID change with [facility].    Therapy plan entered and sent for signature.

## 2024-06-20 NOTE — TELEPHONE ENCOUNTER
----- Message from Meet House MD sent at 6/20/2024  1:13 PM EDT -----  Regarding: SW help  Good afternoon,     I saw Ms. Villanueva in the office today and she is having some financial constraints and would like some help from the social work site.  She has a diagnosis of multiple sclerosis and suffered from a recent relapse      Thank you,     Dr. Harsh MD.   06/20/24   1:13 PM

## 2024-06-20 NOTE — TELEPHONE ENCOUNTER
JAMES called patient at [phone].  Patient would like to talk with her mother present so that she doesn't forget anything discussed.  Patient's mother is not available now so when she is patient will call JAMES back.

## 2024-06-20 NOTE — PATIENT INSTRUCTIONS
MRI Access Program  What is the [facility] MRI Access Program?  The [facility] MRI Access Program assists with the payment of cranial (brain) and c-spine magnetic resonance imaging (MRI) scans for qualified individuals who have no medical insurance or cannot afford their insurance costs and require an MRI to help determine a diagnosis of multiple sclerosis or evaluate current MS disease progression.     What is offered under the program?  For qualified individuals who have MS or when an MS diagnosis is suspected, [facility] will provide financial assistance with:     A. New MRIs: For people in need of new cranial and/or c-spine MRIs, [facility] will:     Refer you to an imaging center that is under contract with [facility]. This applies to people who have no insurance or cannot afford their insurance costs. [facility] will cover the cost of a cranial (brain) MRI, c-spine MRI, or both; and will pay the imaging center directly.  OR     Cover the cost of your medical insurance co-pay or co-insurance balance up to a maximum of $750 per MRI (cranial and/or c-spine). You will need to forward the bill to [facility], and [facility] will pay the billing facility directly. You will be responsible for costs exceeding $750 per MRI. Please note, [facility] does not cover the cost of reading fees.  B. Payment For Past MRIs: For those who have previously had a cranial and/or c-spine MRI, [facility] will cover up to $500 per MRI with a date of service within the past six months of the date of your application. You will submit your bill with your application and [facility] will pay the imaging center directly. [facility] does not reimburse individuals. You will be responsible for costs exceeding $500 per MRI. Please note, [facility] does not cover the cost of reading fees.     Important Note: Clients seeking MRI assistance through either options, A or B, must apply to the MRI Access Program and meet eligibility requirements.     How do I qualify?  To qualify for the MRI Access Program:     Meet the Income  Eligibility Requirement  Have a confirmed MS diagnosis or seeking an MS diagnosis  Have not received [facility] MRI assistance within the past 24 months  Please avoid scheduling any new MRI appointments until a determination on your application has been made by [facility]  Complete the application in full, sign and return all required information to [facility]  For clients or medical professionals in need of the Physician Review Form, you can download it here.     To Apply Online  Please select one option below:  NEW (looking to schedule a NEW MRI exam) or PAST (seeking payment for a PAST MRI exam)     Apply Here for NEW MRI  Apply Here for PAST MRI  To Complete a Paper Application  Please follow these steps:     Step 1. Download the updated [facility] MRI Access Program application.     Step 2: Please complete the forms, print, and sign.     Step 3: Please place the signed forms, along with required documentation, in an envelope and mail to:     [facility]     Attn: MRI Access Program  [address], [place] [address]     Applications can also be faxed to [facility] at [phone]. For questions or to request a mailed copy of the MRI Access Fund application, please call [facility] at [phone], ext. [phone].     Aplicación en español disponible     Descargue la aplicación del Fondo de Acceso de IRM de [employer] en español o comuníquese con [employer] para solicitar chino copia por correo al [phone], extensión 142 o [email or web address].org.     Updated MRI Protocol  To help advance the quality of care for the MS community, [facility] proudly served on an international panel of MS experts to update guidelines for a standardized MRI protocol in multiple sclerosis. The guidelines were recently published in an article in the International Journal of MS Care. [facility] encourages you to download and share this article, Magnetic Resonance Imaging Protocol for Diagnosis and Follow-up of Patients with Multiple Sclerosis, with your healthcare team and local  MRI imaging center.     The MRI Access Program is made possible with support from [employer], [employer], [employer], [employer], [facility], and [employer].

## 2024-06-20 NOTE — TELEPHONE ENCOUNTER
Will send infusion referral to [facility] infusion team. They will obtain approval with pt's insurance.     Solumedrol infusion order written and emailed to MA for provider signature. Please fax to [phone] and scan into the chart once signed. Thanks!    Will address [employer] as well.

## 2024-06-21 DIAGNOSIS — G35 MULTIPLE SCLEROSIS (HCC): Primary | ICD-10-CM

## 2024-06-21 RX ORDER — SODIUM CHLORIDE 9 MG/ML
20 INJECTION, SOLUTION INTRAVENOUS ONCE
OUTPATIENT
Start: 2024-07-09

## 2024-06-21 RX ORDER — ACETAMINOPHEN 325 MG/1
650 TABLET ORAL ONCE
OUTPATIENT
Start: 2024-07-09

## 2024-06-21 NOTE — TELEPHONE ENCOUNTER
Received secure message from Caroline with [facility] infusion team. She is working on pt's referral.

## 2024-06-24 NOTE — TELEPHONE ENCOUNTER
SW called patient at [phone].  Patient financial needs includes help for Ocrevus depending on insurance changes.  Patient spoke with representative from [employer] and is working on completing paperwork in case she needs financial help for the medication.     Also needs help with cost for MRI.  Discussed MRI application through MS[employer] ([employer]).  Patient requested application be sent to her email at [email or web address].  Patient does not have any other needs at this time but SW remains available.     [email or web address]    Mike Davis afternoon.  Please see the attached application for MRI assistance.  Let me know if you have questions or would like my help in sending this to the association.      Also, in case this is helpful.  Here is the contact information to call National Multiple S[employer] Society.  MS Navigators help identify solutions and provide access to the resources you are looking for. Call 1-329.882.4520 or contact them online at Resources & Support  National Multiple Sclerosis Society (nationalmssociety.org) .     I will follow up with you the beginning of next week to see how you made out.  Please let me know if you have questions or anything I can do to help. Thank you,     Eli Zarate  Outpatient   [facility]  [phone]    MRI application uploaded to media this date.

## 2024-06-25 NOTE — TELEPHONE ENCOUNTER
Ocrevus is approved with [employer] from 24 to 24 for 2 visits at [facility] infusion [place]. Auth # [id number].    Therapy plan is signed.     Called [facility] and spoke with Jasmyne. Scheduled Ocrevus for the followin/9/24 @ 8:30am   24 @ 8:30am    Called and spoke with pt. She is agreeable to scheduled infusion appts. Reviewed infusion duration. Pt aware to take medications as usual, hydrate, bring comfort/entertainment items, and food/drink if preferred.     Pt aware to have labs completed 1 week prior to first infusion. She will go to  lab.     The following labs are required and have been entered as appropriate per protocol:    CBC  CMP  HCG  Immunoglobulins    HIV 24  Quantiferon TB gold 24  Hepatitis panel 24

## 2024-06-25 NOTE — TELEPHONE ENCOUNTER
MRI form placed to be scanned to media this date in case patient does qualify for financial assistance for her MRI through MSAA ([employer]).  Will request assistance with completion if needed.  SW remains available.

## 2024-07-06 ENCOUNTER — APPOINTMENT (OUTPATIENT)
Dept: LAB | Facility: MEDICAL CENTER | Age: 34
End: 2024-07-06
Payer: COMMERCIAL

## 2024-07-06 DIAGNOSIS — G35 MULTIPLE SCLEROSIS (HCC): ICD-10-CM

## 2024-07-06 DIAGNOSIS — Z76.89 ENCOUNTER BEFORE STARTING MEDICATION: ICD-10-CM

## 2024-07-06 DIAGNOSIS — G35 RELAPSING REMITTING MULTIPLE SCLEROSIS (HCC): ICD-10-CM

## 2024-07-06 LAB
ALBUMIN SERPL BCG-MCNC: 3.9 G/DL (ref 3.5–5)
ALP SERPL-CCNC: 51 U/L (ref 34–104)
ALT SERPL W P-5'-P-CCNC: 16 U/L (ref 7–52)
ANION GAP SERPL CALCULATED.3IONS-SCNC: 8 MMOL/L (ref 4–13)
AST SERPL W P-5'-P-CCNC: 13 U/L (ref 13–39)
BASOPHILS # BLD AUTO: 0.02 THOUSANDS/ÂΜL (ref 0–0.1)
BASOPHILS NFR BLD AUTO: 0 % (ref 0–1)
BILIRUB SERPL-MCNC: 0.58 MG/DL (ref 0.2–1)
BUN SERPL-MCNC: 10 MG/DL (ref 5–25)
CALCIUM SERPL-MCNC: 9.1 MG/DL (ref 8.4–10.2)
CHLORIDE SERPL-SCNC: 104 MMOL/L (ref 96–108)
CO2 SERPL-SCNC: 27 MMOL/L (ref 21–32)
CREAT SERPL-MCNC: 0.86 MG/DL (ref 0.6–1.3)
EOSINOPHIL # BLD AUTO: 0.12 THOUSAND/ÂΜL (ref 0–0.61)
EOSINOPHIL NFR BLD AUTO: 2 % (ref 0–6)
ERYTHROCYTE [DISTWIDTH] IN BLOOD BY AUTOMATED COUNT: 12.6 % (ref 11.6–15.1)
GFR SERPL CREATININE-BSD FRML MDRD: 89 ML/MIN/1.73SQ M
GLUCOSE P FAST SERPL-MCNC: 78 MG/DL (ref 65–99)
HCG SERPL QL: NEGATIVE
HCT VFR BLD AUTO: 45.3 % (ref 34.8–46.1)
HGB BLD-MCNC: 14.9 G/DL (ref 11.5–15.4)
IGA SERPL-MCNC: 222 MG/DL (ref 66–433)
IGG SERPL-MCNC: 795 MG/DL (ref 635–1741)
IGM SERPL-MCNC: 112 MG/DL (ref 45–281)
IMM GRANULOCYTES # BLD AUTO: 0.03 THOUSAND/UL (ref 0–0.2)
IMM GRANULOCYTES NFR BLD AUTO: 0 % (ref 0–2)
LYMPHOCYTES # BLD AUTO: 2.43 THOUSANDS/ÂΜL (ref 0.6–4.47)
LYMPHOCYTES NFR BLD AUTO: 34 % (ref 14–44)
MCH RBC QN AUTO: 31.1 PG (ref 26.8–34.3)
MCHC RBC AUTO-ENTMCNC: 32.9 G/DL (ref 31.4–37.4)
MCV RBC AUTO: 95 FL (ref 82–98)
MONOCYTES # BLD AUTO: 0.78 THOUSAND/ÂΜL (ref 0.17–1.22)
MONOCYTES NFR BLD AUTO: 11 % (ref 4–12)
NEUTROPHILS # BLD AUTO: 3.75 THOUSANDS/ÂΜL (ref 1.85–7.62)
NEUTS SEG NFR BLD AUTO: 53 % (ref 43–75)
NRBC BLD AUTO-RTO: 0 /100 WBCS
PLATELET # BLD AUTO: 248 THOUSANDS/UL (ref 149–390)
PMV BLD AUTO: 10.1 FL (ref 8.9–12.7)
POTASSIUM SERPL-SCNC: 4.3 MMOL/L (ref 3.5–5.3)
PROT SERPL-MCNC: 6.4 G/DL (ref 6.4–8.4)
RBC # BLD AUTO: 4.79 MILLION/UL (ref 3.81–5.12)
SODIUM SERPL-SCNC: 139 MMOL/L (ref 135–147)
WBC # BLD AUTO: 7.13 THOUSAND/UL (ref 4.31–10.16)

## 2024-07-06 PROCEDURE — 84703 CHORIONIC GONADOTROPIN ASSAY: CPT

## 2024-07-06 PROCEDURE — 82784 ASSAY IGA/IGD/IGG/IGM EACH: CPT

## 2024-07-06 PROCEDURE — 80053 COMPREHEN METABOLIC PANEL: CPT

## 2024-07-06 PROCEDURE — 36415 COLL VENOUS BLD VENIPUNCTURE: CPT

## 2024-07-06 PROCEDURE — 85025 COMPLETE CBC W/AUTO DIFF WBC: CPT

## 2024-07-09 ENCOUNTER — TREATMENT (OUTPATIENT)
Age: 34
End: 2024-07-09

## 2024-07-09 ENCOUNTER — HOSPITAL ENCOUNTER (OUTPATIENT)
Dept: INFUSION CENTER | Facility: CLINIC | Age: 34
Discharge: HOME | End: 2024-07-09
Payer: COMMERCIAL

## 2024-07-09 VITALS
BODY MASS INDEX: 32.95 KG/M2 | TEMPERATURE: 98.5 F | DIASTOLIC BLOOD PRESSURE: 62 MMHG | SYSTOLIC BLOOD PRESSURE: 125 MMHG | WEIGHT: 193 LBS | HEIGHT: 64 IN | HEART RATE: 90 BPM | RESPIRATION RATE: 16 BRPM

## 2024-07-09 DIAGNOSIS — G35 MULTIPLE SCLEROSIS (HCC): Primary | ICD-10-CM

## 2024-07-09 RX ORDER — FAMOTIDINE 10 MG/ML
INJECTION, SOLUTION INTRAVENOUS
Status: COMPLETED
Start: 2024-07-09 | End: 2024-07-09

## 2024-07-09 RX ORDER — SODIUM CHLORIDE 9 MG/ML
20 INJECTION, SOLUTION INTRAVENOUS ONCE
OUTPATIENT
Start: 2024-07-23

## 2024-07-09 RX ORDER — DIPHENHYDRAMINE HYDROCHLORIDE 50 MG/ML
25 INJECTION INTRAMUSCULAR; INTRAVENOUS
Status: ACTIVE | OUTPATIENT
Start: 2024-07-09 | End: 2024-07-09

## 2024-07-09 RX ORDER — FAMOTIDINE 10 MG/ML
20 INJECTION, SOLUTION INTRAVENOUS ONCE
Status: COMPLETED | OUTPATIENT
Start: 2024-07-09 | End: 2024-07-09

## 2024-07-09 RX ORDER — SODIUM CHLORIDE 9 MG/ML
20 INJECTION, SOLUTION INTRAVENOUS ONCE
OUTPATIENT
Start: 2024-07-20

## 2024-07-09 RX ORDER — ACETAMINOPHEN 325 MG/1
650 TABLET ORAL ONCE
OUTPATIENT
Start: 2024-07-20

## 2024-07-09 RX ORDER — SODIUM CHLORIDE 9 MG/ML
20 INJECTION, SOLUTION INTRAVENOUS ONCE
Status: COMPLETED | OUTPATIENT
Start: 2024-07-09 | End: 2024-07-09

## 2024-07-09 RX ORDER — ACETAMINOPHEN 325 MG/1
650 TABLET ORAL ONCE
OUTPATIENT
Start: 2024-07-23

## 2024-07-09 RX ORDER — ACETAMINOPHEN 325 MG/1
650 TABLET ORAL ONCE
Status: COMPLETED | OUTPATIENT
Start: 2024-07-09 | End: 2024-07-09

## 2024-07-09 RX ADMIN — DIPHENHYDRAMINE HYDROCHLORIDE 50 MG: 50 INJECTION, SOLUTION INTRAMUSCULAR; INTRAVENOUS at 08:51

## 2024-07-09 RX ADMIN — OCRELIZUMAB 300 MG: 300 INJECTION INTRAVENOUS at 10:30

## 2024-07-09 RX ADMIN — ACETAMINOPHEN 650 MG: 325 TABLET, FILM COATED ORAL at 08:24

## 2024-07-09 RX ADMIN — SODIUM CHLORIDE 500 ML: 0.9 INJECTION, SOLUTION INTRAVENOUS at 12:30

## 2024-07-09 RX ADMIN — HYDROCORTISONE SODIUM SUCCINATE 50 MG: 100 INJECTION, POWDER, FOR SOLUTION INTRAMUSCULAR; INTRAVENOUS at 12:36

## 2024-07-09 RX ADMIN — HYDROCORTISONE SODIUM SUCCINATE 100 MG: 100 INJECTION, POWDER, FOR SOLUTION INTRAMUSCULAR; INTRAVENOUS at 09:24

## 2024-07-09 RX ADMIN — SODIUM CHLORIDE 20 ML/HR: 0.9 INJECTION, SOLUTION INTRAVENOUS at 08:24

## 2024-07-09 RX ADMIN — FAMOTIDINE 20 MG: 10 INJECTION, SOLUTION INTRAVENOUS at 08:25

## 2024-07-09 RX ADMIN — FAMOTIDINE 20 MG: 10 INJECTION, SOLUTION INTRAVENOUS at 12:38

## 2024-07-09 RX ADMIN — DIPHENHYDRAMINE HYDROCHLORIDE 25 MG: 50 INJECTION, SOLUTION INTRAMUSCULAR; INTRAVENOUS at 12:37

## 2024-07-09 RX ADMIN — SODIUM CHLORIDE 125 MG: 0.9 INJECTION, SOLUTION INTRAVENOUS at 09:26

## 2024-07-09 NOTE — PROGRESS NOTES
Patient had some scalp itchiness during the Ocrevus infusion (3/4 infusion done).  The infusion was stopped and she was given medications benadryl, pepcid, and solu-cortef.  Her symptoms have resolved and vital signs have been stable as per the communication through epic secure chat.     She is okay to be rechallenged again with the Ocrevus infusion.     Thank you,     Dr. Harsh MD.   07/09/24   1:14 PM

## 2024-07-09 NOTE — PROGRESS NOTES
Pt here today for her first Ocrevus infusion, offers no complaints, Education done w/ pt and family, pt anxious,  emotional support given. At 12;30 pm approximately 3/4 through her infusion pt c/o of mild itching of scalp/ears, no rash noted but infusion stopped and hypersensitivity protocol initiated.  VS stable throughout, pt stated the itching subsided within 15 mins. At 1pm MD notified and Ocrevus Infusion Reaction protocol to re-start was followed as per MD order. Infusion re-started at half the rate of when it was stopped and titrated every 30 mins by 30 ml's. Pt tolerating well. The rest of the  infusion completed w/o complications, aware of there next appt on 7/23 at 8:30, AVS given and pt D/C home in stable condition.

## 2024-07-10 ENCOUNTER — TELEPHONE (OUTPATIENT)
Age: 34
End: 2024-07-10

## 2024-07-10 NOTE — TELEPHONE ENCOUNTER
Spoke with patient said she is in the middle of getting injection therapy and will not be able to conceive right now. Has asked us to defer her order until Jan and to follow up with her then.

## 2024-07-11 ENCOUNTER — TELEPHONE (OUTPATIENT)
Dept: NEUROLOGY | Facility: CLINIC | Age: 34
End: 2024-07-11

## 2024-07-11 NOTE — TELEPHONE ENCOUNTER
"Pt is scheduled for 2nd Ocrevus on 7/23/24.     Ocrevus is approved with [employer] from 6/20/24 to 8/18/24 for 2 visits at [facility] infusion center. Auth # [id number].    The following labs are required and have been entered as appropriate per protocol:    CBC 7/6/24  CMP 7/6/24  HCG 7/6/24  Immunoglobulins 7/6/24  HIV 6/8/24  Quantiferon TB gold 6/8/24  Hepatitis panel 6/8/24    First infusion completed on 7/9/24. Per infusion notes, \"\"approximately 3/4 through her infusion pt c/o of mild itching of scalp/ears, no rash noted but infusion stopped and hypersensitivity protocol initiated.  VS stable throughout, pt stated the itching subsided within 15 mins. At 1pm MD notified and Ocrevus Infusion Reaction protocol to re-start was followed as per MD order. Infusion re-started at half the rate of when it was stopped and titrated every 30 mins by 30 ml's. Pt tolerating well. The rest of the  infusion completed w/o complications.\"     Okay to proceed with 2nd Ocrevus infusion on 7/23/24?    "

## 2024-07-11 NOTE — TELEPHONE ENCOUNTER
Yes please proceed with Ocrevus infusion.     Thank you,     Dr. Harsh MD.   07/11/24   9:59 AM

## 2024-07-22 ENCOUNTER — TELEPHONE (OUTPATIENT)
Age: 34
End: 2024-07-22

## 2024-07-22 DIAGNOSIS — Z83.2 FAMILY HISTORY OF FACTOR V LEIDEN MUTATION: Primary | ICD-10-CM

## 2024-07-22 NOTE — TELEPHONE ENCOUNTER
Pt called stating her dad was checked about 10 years back for a blood clotting disorder but cannot remember which one. 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 States her brother is having some issues now and wants to know if she can have some labs drawn to check for factor v leiden.    Please advise

## 2024-07-23 ENCOUNTER — TELEPHONE (OUTPATIENT)
Age: 34
End: 2024-07-23

## 2024-07-23 ENCOUNTER — HOSPITAL ENCOUNTER (OUTPATIENT)
Dept: INFUSION CENTER | Facility: CLINIC | Age: 34
Discharge: HOME | End: 2024-07-23
Payer: COMMERCIAL

## 2024-07-23 VITALS
HEART RATE: 71 BPM | DIASTOLIC BLOOD PRESSURE: 88 MMHG | RESPIRATION RATE: 18 BRPM | SYSTOLIC BLOOD PRESSURE: 144 MMHG | TEMPERATURE: 98.1 F

## 2024-07-23 DIAGNOSIS — G35 MULTIPLE SCLEROSIS (HCC): Primary | ICD-10-CM

## 2024-07-23 PROCEDURE — 96415 CHEMO IV INFUSION ADDL HR: CPT

## 2024-07-23 PROCEDURE — 96375 TX/PRO/DX INJ NEW DRUG ADDON: CPT

## 2024-07-23 PROCEDURE — 96413 CHEMO IV INFUSION 1 HR: CPT

## 2024-07-23 PROCEDURE — 96367 TX/PROPH/DG ADDL SEQ IV INF: CPT

## 2024-07-23 RX ORDER — SODIUM CHLORIDE 9 MG/ML
20 INJECTION, SOLUTION INTRAVENOUS ONCE
OUTPATIENT
Start: 2025-01-05

## 2024-07-23 RX ORDER — ACETAMINOPHEN 325 MG/1
650 TABLET ORAL ONCE
OUTPATIENT
Start: 2025-01-05

## 2024-07-23 RX ORDER — SODIUM CHLORIDE 9 MG/ML
20 INJECTION, SOLUTION INTRAVENOUS ONCE
Status: CANCELLED | OUTPATIENT
Start: 2025-01-05

## 2024-07-23 RX ORDER — SODIUM CHLORIDE 9 MG/ML
20 INJECTION, SOLUTION INTRAVENOUS ONCE
Status: COMPLETED | OUTPATIENT
Start: 2024-07-23 | End: 2024-07-23

## 2024-07-23 RX ORDER — ACETAMINOPHEN 325 MG/1
650 TABLET ORAL ONCE
Status: COMPLETED | OUTPATIENT
Start: 2024-07-23 | End: 2024-07-23

## 2024-07-23 RX ORDER — ACETAMINOPHEN 325 MG/1
650 TABLET ORAL ONCE
Status: CANCELLED | OUTPATIENT
Start: 2025-01-05

## 2024-07-23 RX ADMIN — ACETAMINOPHEN 650 MG: 325 TABLET, FILM COATED ORAL at 09:53

## 2024-07-23 RX ADMIN — SODIUM CHLORIDE 20 ML/HR: 0.9 INJECTION, SOLUTION INTRAVENOUS at 08:52

## 2024-07-23 RX ADMIN — FAMOTIDINE 20 MG: 10 INJECTION, SOLUTION INTRAVENOUS at 08:53

## 2024-07-23 RX ADMIN — DIPHENHYDRAMINE HYDROCHLORIDE 50 MG: 50 INJECTION, SOLUTION INTRAMUSCULAR; INTRAVENOUS at 09:17

## 2024-07-23 RX ADMIN — HYDROCORTISONE SODIUM SUCCINATE 100 MG: 100 INJECTION, POWDER, FOR SOLUTION INTRAMUSCULAR; INTRAVENOUS at 09:54

## 2024-07-23 RX ADMIN — SODIUM CHLORIDE 125 MG: 0.9 INJECTION, SOLUTION INTRAVENOUS at 09:57

## 2024-07-23 RX ADMIN — OCRELIZUMAB 300 MG: 300 INJECTION INTRAVENOUS at 10:45

## 2024-07-23 NOTE — PROGRESS NOTES
Patient presents for Ocrevus infusion. Patient denies any recent antibiotic use or signs of infection. Patient offers no complaints. Pt reported having some itching during last infusion that resolved after hypersensitivity protocol was used, she said she took some benadryl as a precaution that evening and didn't have any further symptoms. Patient tolerated todays infusion well. Pt had no issues during 1 hour post observation. Ambulated out of department with steady gait with mother. AVS declined Next appointment 1/6/25 at 0830 reviewed.

## 2024-07-25 ENCOUNTER — APPOINTMENT (OUTPATIENT)
Dept: LAB | Facility: MEDICAL CENTER | Age: 34
End: 2024-07-25
Payer: COMMERCIAL

## 2024-07-25 DIAGNOSIS — G35 MULTIPLE SCLEROSIS (HCC): ICD-10-CM

## 2024-07-25 LAB
ALBUMIN SERPL BCG-MCNC: 3.9 G/DL (ref 3.5–5)
ALP SERPL-CCNC: 52 U/L (ref 34–104)
ALT SERPL W P-5'-P-CCNC: 12 U/L (ref 7–52)
ANION GAP SERPL CALCULATED.3IONS-SCNC: 7 MMOL/L (ref 4–13)
AST SERPL W P-5'-P-CCNC: 14 U/L (ref 13–39)
BASOPHILS # BLD AUTO: 0.04 THOUSANDS/ÂΜL (ref 0–0.1)
BASOPHILS NFR BLD AUTO: 1 % (ref 0–1)
BILIRUB SERPL-MCNC: 0.38 MG/DL (ref 0.2–1)
BUN SERPL-MCNC: 12 MG/DL (ref 5–25)
CALCIUM SERPL-MCNC: 9 MG/DL (ref 8.4–10.2)
CHLORIDE SERPL-SCNC: 106 MMOL/L (ref 96–108)
CO2 SERPL-SCNC: 27 MMOL/L (ref 21–32)
CREAT SERPL-MCNC: 1.01 MG/DL (ref 0.6–1.3)
EOSINOPHIL # BLD AUTO: 0.11 THOUSAND/ÂΜL (ref 0–0.61)
EOSINOPHIL NFR BLD AUTO: 2 % (ref 0–6)
ERYTHROCYTE [DISTWIDTH] IN BLOOD BY AUTOMATED COUNT: 13 % (ref 11.6–15.1)
GFR SERPL CREATININE-BSD FRML MDRD: 73 ML/MIN/1.73SQ M
GLUCOSE P FAST SERPL-MCNC: 75 MG/DL (ref 65–99)
HCG SERPL QL: NEGATIVE
HCT VFR BLD AUTO: 42.3 % (ref 34.8–46.1)
HGB BLD-MCNC: 13.8 G/DL (ref 11.5–15.4)
IMM GRANULOCYTES # BLD AUTO: 0.02 THOUSAND/UL (ref 0–0.2)
IMM GRANULOCYTES NFR BLD AUTO: 0 % (ref 0–2)
LYMPHOCYTES # BLD AUTO: 2.55 THOUSANDS/ÂΜL (ref 0.6–4.47)
LYMPHOCYTES NFR BLD AUTO: 43 % (ref 14–44)
MCH RBC QN AUTO: 31 PG (ref 26.8–34.3)
MCHC RBC AUTO-ENTMCNC: 32.6 G/DL (ref 31.4–37.4)
MCV RBC AUTO: 95 FL (ref 82–98)
MONOCYTES # BLD AUTO: 0.61 THOUSAND/ÂΜL (ref 0.17–1.22)
MONOCYTES NFR BLD AUTO: 10 % (ref 4–12)
NEUTROPHILS # BLD AUTO: 2.61 THOUSANDS/ÂΜL (ref 1.85–7.62)
NEUTS SEG NFR BLD AUTO: 44 % (ref 43–75)
NRBC BLD AUTO-RTO: 0 /100 WBCS
PLATELET # BLD AUTO: 280 THOUSANDS/UL (ref 149–390)
PMV BLD AUTO: 10.2 FL (ref 8.9–12.7)
POTASSIUM SERPL-SCNC: 4.5 MMOL/L (ref 3.5–5.3)
PROT SERPL-MCNC: 6.3 G/DL (ref 6.4–8.4)
RBC # BLD AUTO: 4.45 MILLION/UL (ref 3.81–5.12)
SODIUM SERPL-SCNC: 140 MMOL/L (ref 135–147)
WBC # BLD AUTO: 5.94 THOUSAND/UL (ref 4.31–10.16)

## 2024-07-25 PROCEDURE — 84703 CHORIONIC GONADOTROPIN ASSAY: CPT

## 2024-07-25 PROCEDURE — 85025 COMPLETE CBC W/AUTO DIFF WBC: CPT

## 2024-07-25 PROCEDURE — 80053 COMPREHEN METABOLIC PANEL: CPT

## 2024-07-25 PROCEDURE — 36415 COLL VENOUS BLD VENIPUNCTURE: CPT

## 2024-07-28 NOTE — PROGRESS NOTES
"   Colposcopy     Date/Time  7/29/2024 10:53 AM     Universal Protocol   Consent: Verbal consent obtained. Written consent obtained.  Risks and benefits: risks, benefits and alternatives were discussed  Consent given by: patient  Time out: Immediately prior to procedure a \"time out\" was called to verify the correct patient, procedure, equipment, support staff and site/side marked as required.  Timeout called at: 7/29/2024 10:53 PM.  Patient understanding: patient states understanding of the procedure being performed  Patient consent: the patient's understanding of the procedure matches consent given  Procedure consent: procedure consent matches procedure scheduled  Relevant documents: relevant documents present and verified  Patient identity confirmed: verbally with patient     Performed by  WINSOME West   Authorized by  WINSOME West     Pre-procedure details      Pre-procedure timeout performed: yes      Prepped with: acetic acid     Indication    Indications: 4/26/24 normal pap with + Other HR HPV, 9/23/22 normal pap with + Other HR HPV.   Procedure Details   Procedure: Colposcopy w/ cervical biopsy and ECC      Under satisfactory analgesia the patient was prepped and draped in the dorsal lithotomy position: yes      Clearfield speculum was placed in the vagina: yes      Endocervix was curetted using a Kevorkian curette: yes      Cervical biopsy performed with a cervical biopsy punch: yes      Monsel's solution was applied: yes      Biopsy(s): yes      Location:  0600, 0900, ECC    Specimen to pathology: yes     Post-procedure      Findings: Bleeding and White epithelium      Impression: Low grade cervical dysplasia      Patient tolerance of procedure:  Tolerated well, no immediate complications   Comments       9/23/22 normal with + other HR HPV; did not return for colp as recommended  4/26/24 normal with + other HR HPV          "

## 2024-07-29 ENCOUNTER — PROCEDURE VISIT (OUTPATIENT)
Dept: OBGYN CLINIC | Facility: CLINIC | Age: 34
End: 2024-07-29
Payer: COMMERCIAL

## 2024-07-29 ENCOUNTER — APPOINTMENT (OUTPATIENT)
Dept: LAB | Facility: HOSPITAL | Age: 34
End: 2024-07-29
Payer: COMMERCIAL

## 2024-07-29 ENCOUNTER — OFFICE VISIT (OUTPATIENT)
Age: 34
End: 2024-07-29
Payer: COMMERCIAL

## 2024-07-29 VITALS — SYSTOLIC BLOOD PRESSURE: 120 MMHG | WEIGHT: 192 LBS | BODY MASS INDEX: 32.96 KG/M2 | DIASTOLIC BLOOD PRESSURE: 70 MMHG

## 2024-07-29 VITALS
OXYGEN SATURATION: 98 % | HEIGHT: 64 IN | DIASTOLIC BLOOD PRESSURE: 74 MMHG | BODY MASS INDEX: 33.12 KG/M2 | WEIGHT: 194 LBS | SYSTOLIC BLOOD PRESSURE: 128 MMHG | HEART RATE: 111 BPM

## 2024-07-29 DIAGNOSIS — G35 MULTIPLE SCLEROSIS (HCC): Primary | ICD-10-CM

## 2024-07-29 DIAGNOSIS — Z83.2 FAMILY HISTORY OF FACTOR V LEIDEN MUTATION: ICD-10-CM

## 2024-07-29 DIAGNOSIS — R87.810 CERVICAL HIGH RISK HUMAN PAPILLOMAVIRUS (HPV) DNA TEST POSITIVE: Primary | ICD-10-CM

## 2024-07-29 LAB — SL AMB POCT URINE HCG: NEGATIVE

## 2024-07-29 PROCEDURE — 88344 IMHCHEM/IMCYTCHM EA MLT ANTB: CPT | Performed by: PATHOLOGY

## 2024-07-29 PROCEDURE — 81241 F5 GENE: CPT

## 2024-07-29 PROCEDURE — 57454 BX/CURETT OF CERVIX W/SCOPE: CPT | Performed by: NURSE PRACTITIONER

## 2024-07-29 PROCEDURE — 99215 OFFICE O/P EST HI 40 MIN: CPT | Performed by: PSYCHIATRY & NEUROLOGY

## 2024-07-29 PROCEDURE — 88305 TISSUE EXAM BY PATHOLOGIST: CPT | Performed by: PATHOLOGY

## 2024-07-29 PROCEDURE — 81025 URINE PREGNANCY TEST: CPT | Performed by: NURSE PRACTITIONER

## 2024-07-29 PROCEDURE — 36415 COLL VENOUS BLD VENIPUNCTURE: CPT

## 2024-07-29 RX ORDER — SUMATRIPTAN 50 MG/1
50 TABLET, FILM COATED ORAL AS NEEDED
Qty: 9 TABLET | Refills: 2 | Status: SHIPPED | OUTPATIENT
Start: 2024-07-29

## 2024-07-29 NOTE — PATIENT INSTRUCTIONS
Take over the counter pain medication as needed for cramping.   Light vaginal bleeding with a slight brown or black color (coffee ground appearance) is normal for several days.   Call office with vaginal bleeding heavier than a normal menses.   No sexual activity x 7 days.    You may see your biopsy results on [employer] before I see the results. As soon as I review them, you will get a [employer] message or phone call to discuss the results and plan of care.   Smoking cessation recommended.   Return to office for annual exam

## 2024-07-29 NOTE — PROGRESS NOTES
NEUROLOGY OUTPATIENT - FOLLOW UP PATIENT VISIT NOTE        NAME: Susan Villanueva  MRN: [id number]  : 1990     TODAY'S DATE: 24         HISTORY OF PRESENT ILLNESS (HPI):    Ms. Villanueva is a 33 y.o. female presenting with Multiple Sclerosis and Migraine    MS HISTORY:  Symptoms onset:   Initial symptoms: Left-sided facial numbness (Dr. Camacho) [facility]  MS diagnosis: 2019  Disease course at onset: Relapsing remitting Multiple Sclerosis   Current disease course: Relapsing remitting Multiple Sclerosis   Subsequent symptoms:    2023-dizziness/vertiginous symptoms s/p 5 days of IVMP.    6/10/2024-numbness and tingling in the left upper and lower left, hands   Family history of MS: No  Spinal tap: positive--11 bands, 0 in the serum. (Positive) protein 40 and CSF, WBC count 2, RBC count 5, and CSF glucose was 64   Prior DMT's for MS: Copaxone (stopped secondary to the side effects)  JCV test: 0.15 negative   [facility]  MOG Ab:not tested  Last MRI of the brain:     2024-new enhancing lesions noted  2023-new enhancing lesion noted.   Last MRI of the C spine:   2023-DDD, no lesions noted.   2024-new*abnormality noted  Last MRI of the T spine:2023-DDD, no lesions noted.           INTERIM HISTORY:  DMT: 2024--2024-Ocrevus infusion  Side effects: She did had some itching which resolved with Benadryl during the first infusion which she did not had any side effects from the second half dose  Baseline safety labs: Stable    New symptoms: Denies having any new symptoms  Progression: No concerning symptoms  New MRI findings: MRI of the brain from  was showing new disease activity for which she underwent 3 days of IV Solu-Medrol    24-Steroids infusion for her underlying symptoms (left sided paresthesia and numbness).     MS ROS:   Sensory symptoms (numbness, tingling and paresthesia): No new symptoms. (Resolved)New onset of  "paresthesias/numbness around the left side of her face --previously at her baseline she had some periorbital numbness that would be intermittent--  Weakness: No new symptoms. At baseline she does have increased heaviness in her extremities.   Spasms: No new symptoms. At baseline she does have some joint and ankle and spasms  Vision problems (loss of vision or double vision):No symptoms.   Ambulatory dysfunction: No new symptoms.   Vertigo and Dizziness: No new complaints. She did had some balance complaints  Cognitive complaints: No new complaints. Brain fog is improving.   Speech complaints: No new complaints.   Depression: No new symptoms reported   Anxiety: Cymbalta is helping. Anxiety is much better.   Fatigue: Fatigue still there.   Bladder symptoms: No new complaints  Bowel symptoms: no complaints.   Lhermitte's sign: ++   Uhthoff's phenomenon: ++   MS hugs like symptoms:  a few episodes in the past           Headaches:   She is also complaining of headaches, which started around 6 months back, mostly in the center of her head, unsure what triggers it. Pressure like sensation, \"like brain is moving in the skull\". Light sensitivity and sound sensitivity present. Some nausea but no vomiting. The headache can start from a 1 and at the end of the evening, she is not able to sleep. Usually lasting for the whole day. She is getting the headaches about once a week depending upon \"what ever the trigger is\".           PRIOR NOTES:  Ms. Villanueva is coming in for an urgent appointment after recent MS exacerbation consisting of numbness on the left side of her face along with her upper extremity.  She also underwent MRI of the brain and cervical spine which were showing new enhancing lesions for which she underwent 3 days of IV Solu-Medrol but she feels that her symptoms which initially improved are starting to get worse again      In regards to her sensory symptoms she mentions that she feels that the left side of her " "face is having \" cold drooling episodes\" like someone has given her Novocain.  She is also feeling a lot of heaviness in her body and even a short amount of exertion would cause her symptoms to get worse.  She is also complaining of brain fog and increased anxiety and worsening fatigue.    It seems that the most recent relapses take in her financial and emotional toll on her.  She had to pay a lot of money for the MRIs, $300 co-pay as well as paying the doctor's co-pay.      MS ROS:   Sensory symptoms (numbness, tingling and paresthesia): New onset of paresthesias/numbness around the left side of her face --previously at her baseline she had some periorbital numbness that would be intermittent  Weakness: She does have increased heaviness in her extremities.   Spasms: Some joint and ankle and spasms  Vision problems (loss of vision or double vision): On Tuesday she had increased blurred vision.   Ambulatory dysfunction: Increased heaviness all over her body affecting her ambulation   Vertigo and Dizziness: She has some complaints, popping sounds. Vertigo.    Cognitive complaints: Brain fog since the last clinic relapse which is affecting her work   Speech complaints: Sometimes she would have word finding difficulties but nothing significant  Depression: No new symptoms reported   Anxiety: More anxiety since her clinical relapse.   Fatigue: Fatigue seems to be worse since her most recent clinical relapse  Bladder symptoms: No new complaints  Bowel symptoms: Some constipation  Lhermitte's sign: ++   Uhthoff's phenomenon: ++   MS hugs like symptoms:  a few episodes in the past        Since last visit, Ms. Villanueva reports that their symptoms are stable.  During her last visit it was discussed that she needs to be on a disease modifying therapy and  we discussed about Ocrevus, rituximab and Kesimpta but due to the fact that the patient wanted to get pregnant we decided to delay the start of the medication    Since last " clinic visit, Ms. Villanueva denies any new focal neurologic symptoms suggestive of inflammatory disease activity. Specifically patient denies any episodes lasting greater than 24 hours of painful vision loss, double vision, oscillopsia, slurred speech, vertigo, incoordination, focal weakness, bowel or bladder incontinence, focal sensory loss, or Lhermitte's.     Progression: stable    She is mentioning that she is having once a week headaches that usually happen at the very middle part of the brain.  These headaches usually go away by the time she is sleeps and wake up the next morning but only in 1 episode the headaches lasted for about a day.  She mentioned that there is some light sensitivity along with some sensitivity, some nausea but no vomiting.  She was initially thinking that these headaches might be triggered by caffeine withdrawal but she has not been able to find a trigger for these headaches      MS ROS:   Sensory symptoms (numbness, tingling and paresthesia): At baseline she has some numbness in the left perioral region which is intermittent and usually goes away within a few minutes  Weakness: No new symptoms reported   Spasms: No new symptoms reported   Vision problems (loss of vision or double vision): No new symptoms reported (except with her headaches)  Ambulatory dysfunction: No new symptoms reported   Vertigo and Dizziness: No new symptoms reported   Cognitive complaints: No new complaints  Speech complaints: Sometimes she would have word finding difficulties but nothing significant  Depression: No new symptoms reported   Anxiety: No new symptoms reported   Fatigue: Fatigue is present at baseline and some days she has to sleep at least 8 hours to continue working during the day  Bladder symptoms: No new complaints  Bowel symptoms: Some constipation  Lhermitte's sign: ++   Uhthoff's phenomenon: ++   MS hugs like symptoms:  a few episodes in the past        12/5/2023-presented to the ER with 1 week  "history of intermittent vertigo for which she underwent MRI of the brain which was showing enhancing lesions.  Previously she was diagnosed with multiple sclerosis when she presented with left-sided face numbness and was diagnosed based on the MRI findings and positive spinal tap.  She was initially put on Copaxone but had side effects.   MS ROS:   Sensory symptoms (numbness, tingling and paresthesia):  She does have some numbness in the left side of the lip, which comes and goes.   Weakness: No new symptoms reported   Spasms: No new symptoms reported   Vision problems (loss of vision or double vision): No new symptoms reported   Ambulatory dysfunction: No new symptoms reported   Vertigo and Dizziness: No new symptoms reported   Cognitive complaints: Improved since the steroids   Speech complaints: Improved since the steroids.    Depression: No new symptoms reported   Anxiety: No new symptoms reported   Fatigue: Much better since the hospital --no day time naps  Bladder symptoms: last 6 months, she is having increased urgency. 3-5 accidents.   Bowel symptoms: No new symptoms reported   Lhermitte's sign: ++   Uhthoff's phenomenon: ++   MS hugs like symptoms:  a few episodes in the past    CURRENT OUTPATIENT MEDICATIONS:    Susan Villanueva has a current medication list which includes the following prescription(s): alprazolam, cholecalciferol, duloxetine, krill oil, magnesium, multiple vitamin, norethindrone, probiotic product, and turmeric.       PHYSICAL EXAMINATION:   VITAL SIGNS:  height is 5' 4\" (1.626 m) and weight is 88 kg (194 lb). Her blood pressure is 128/74 and her pulse is 111 (abnormal). Her oxygen saturation is 98%.        NEUROLOGICAL EXAMINATION:    MENTAL STATUS EXAM: Alert, oriented to time place and person,     CRANIAL NERVE EXAMINATION:  I Not tested   II Normal visual fields to finger counting.   II, Ill,  IV, VI Pupils were symmetric, briskly reactive. No afferent pupillary defect.  Eye movements " "are full without nystagmus. No ptosis.   V Facial sensation decreased in the left V2 to V3 region   VII Facial movements were symmetrical    VIII Hearing was intact   IX, X Intact   XI Shoulder shrug and head turn was normal   XII Tongue protrusion is in the mid line.          MOTOR EXAM:        Arm Right  Left Leg Right  Left   Deltoid 5/5 5/5 lliopsoas 5/5 5/5   Biceps 5/5 5/5 Quads 5/5 5/5   Triceps 5/5 5/5 Hamstrings 5/5 5/5   Wrist Extension 5/5 5/5 Ankle Dorsi Flexion 5/5 5/5   Wrist Flexion 5/5 5/5 Ankle Plantar Flexion 5/5 5/5               DEEP TENDON REFLEXES:     Brachioradialis Biceps Triceps Patellar Achilles   Right 2+ 3+ 3+ 3+ 3+   Left 3+ 2+ 3+ 3+ 3+            SENSORY EXAMINATION:   Sensation to dull touch intact     COORDINATION:   normal finger to nose testing     GAIT/STATION:   normal        DIAGNOSTIC STUDIES:   PERTINENT LABS:     Lab Results   Component Value Date    WBC 5.94 07/25/2024     Lab Results   Component Value Date    HGB 13.8 07/25/2024     Lab Results   Component Value Date     07/25/2024     Lab Results   Component Value Date    LYMPHSABS 2.55 07/25/2024     No results found for: \"LABLYMP\"  Lab Results   Component Value Date    EOSABS 0.11 07/25/2024     No components found for: \"NEUTROPHILS\"    No results found for: \"LABMONO\"         No results found for: \"LABGLUC\"  Lab Results   Component Value Date    CREATININE 1.01 07/25/2024     Lab Results   Component Value Date    AST 14 07/25/2024     Lab Results   Component Value Date    ALT 12 07/25/2024     Lab Results   Component Value Date    ALKPHOS 52 07/25/2024     Lab Results   Component Value Date    AST 14 07/25/2024         Lab Results   Component Value Date    TSH 1.79 09/02/2020    HEPBSAG Non-reactive 06/08/2024    HEPBSAB 10.70 06/08/2024    HEPCAB Non-reactive 06/08/2024            NEUROIMAGING:  Results for orders placed in visit on 12/04/23    MRI brain w wo contrast      MRI brain w wo contrast      "       Results for orders placed during the hospital encounter of 06/13/24    MRI brain MS wo and w contrast    Impression  Compared to 11/30/2023:  Evidence of new activity in a chronic tiny left frontal lesion and new active right frontal and temporal demyelinating lesions.    Resolved findings of activity in left temporal and right frontoparietal and periatrial lesions.    Workstation performed: [id number]      Results for orders placed during the hospital encounter of 11/30/23    MRI brain MS wo and w contrast    Impression  Multiple periventricular, subcortical, and pericallosal white matter demyelinating lesions consistent with the patient's history of multiple sclerosis, moderate plaque burden. New enhancing lesions within the right frontoparietal, left temporal, right  periatrial, and right centrum semiovale white matter as detailed compatible with acute demyelination compared to the prior outside 1/22/2021 examination. The larger lesions demonstrate surrounding vasogenic edema. There are additional new subcentimeter  lesions without enhancement including a right pontine focus.    The study was marked in EPIC for immediate notification.        Workstation performed: [id number]       Results for orders placed during the hospital encounter of 06/12/24    MRI cervical spine w wo contrast    Impression  -New STIR signal abnormality within the dorsal column at C3-C4 and left lateral cord at C6-C7 suspicious for demyelinating plaque. No pathologic enhancement to suggest active demyelination.    -No significant spinal canal or foraminal narrowing.    The study was marked in EPIC for significant notification.      Workstation performed: [id number]      Results for orders placed during the hospital encounter of 12/05/23    MRI cervical spine w wo contrast    Impression  No evidence of cervical spinal cord demyelination.    No stenosis.          Workstation performed: [id number]      Results for orders placed during the  hospital encounter of 12/05/23    MRI thoracic spine w wo contrast    Impression  No evidence of thoracic cord demyelination.    Single small T9-T10 disc herniation causing minimal central canal narrowing.      Workstation performed: [id number]      She had MRI of the orbits and MRI of the brain with contrast. It shows enhancement in the optic nerves as well as enhancement also seen on right posterior frontal white matter area and they did notice hyperintensity signal involving prince and lesser extent to midbrain and pontomedullary junction. Please review official report for details.    She had MRI of the thoracic spine. It show a tiny focal area of probable demyelination involving the right anterolateral aspect of the thoracic cord. Please review official report for details.      MRI BRAIN W CONTRAST 10/2/2019  Narrative  MRI brain and orbits with and without contrast    History: Left-sided facial numbness. Episode of double vision.    Comparison: MRI brain/MRA head and neck    Technique: Multiplanar MRI of the brain with special attention to  the orbits was performed utilizing short and long TE sequences.  Scanning was performed prior to and following the administration of  8.5 cc of intravenous contrast material.    Report:    Demonstrated are periventricular/callosal septal interface focal  areas of T2 and FLAIR hyperintensity suspicious for multiple  sclerosis. Some of this abnormal signal involves the pontomedullary  junction, prince, and to a lesser extent the midbrain.    Cerebellar tonsils terminate above the foramen magnum. The  visualized intracranial, canalicular, and intraorbital portions of  the optic nerves are unremarkable in appearance. There is, however,  enhancement involving the distal infraorbital as well as within  portions of the optic nerves lesser extent findings are suspicious  for demyelination. The optic chiasm itself is unremarkable with no  abnormal enhancement. It should be noted that  there is curvilinear  enhancement involving a single posterior frontal white matter lesion  in the right centrum semiovale.    Flow voids are unremarkable. Dural venous sinuses are unremarkable.    There is minimal to moderate ethmoid air cell thickening. There is  minimal bilateral frontal sinus because of thickening.    Impression  Impression:    Multiple sclerosis with active demyelination including enhancement  of the optic nerves. Neuromyelitis spectrum disorder is less likely  in the absence of spinal cord findings.        ASSESSMENT AND PLAN:    Ms. Villanueva is a 33 y.o.female  presenting with multiple sclerosis follow-up and a recent clinical relapse (6/10/2024-associated with facial numbness and breakthrough radiological disease).Briefly patient was diagnosed with multiple sclerosis based on left-sided facial numbness after undergoing an MRI of the brain which was showing lesions consistent with MS and positive spinal tap and meets the 2017 Pfeiffer's criteria for relapsing remitting MS. Patient  has a past medical history of Abnormal Pap smear of cervix (2015), Anxiety, Depression, Multiple sclerosis (HCC), Opioid abuse, in remission (HCC), Polycystic ovary syndrome, Psychiatric disorder, Seasonal allergies, and Substance abuse (HCC)..  Neurological examination is stable and has actually improved from the last visit    Relapsing remitting multiple sclerosis  Active, nonprogressive phase of the disease--recent clinical relapse in June of this year  Continue with Ocrevus as your disease modifying therapy every 6 months.  Would recommend getting an MRI of the brain, cervical spine with and without contrast in about 6 months of starting the Ocrevus to check for any subclinical disease activity    Supplements:   Vit D and other supplements as recommended. Written instructions were given. Vit D goal is above 40.   Exercise:   Regular exercise was recommended (At least 150 min a week)  Diet:   Mediterranean diet  ( instructions were given)      Migraine headaches  She is using Cymbalta for her underlying anxiety which can also benefit her migraine headaches and has these headaches have been happening once or twice a week I would recommend only rescue medication like Imitrex which she can use on as-needed basis    Return in about 6 months (around 1/29/2025), or 60 min follow up..       The management plan was discussed in detail with the patient and all questions were answered.     Ms. Villanueva was encouraged to contact our office with any questions or concerns and to contact the clinic or go to the nearest emergency room if symptoms change or worsen.       I have spent a total of 43 min in reviewing and/or ordering tests, medications, or procedures, performing an examination or evaluation, reviewing pertinent history, counseling and educating the patient, referring and/or communicating with other health care professionals, documenting in the EMR and general coordination of care of Ms. Villanueva  today.           Meet House MD.   Staff Neurologist,   Neuroimmunology and Neuroinfectious disease  07/29/24     This report has been created through the use of voice recognition/text compilation software.  Typographical and content errors may occur with this process.  While efforts are made to detect and correct such errors, in some cases errors will persist.  For this reason, wording in this document should be considered in the proper context and not strictly verbatim.  If, when reviewing the document, an error is discovered, please let the office know at [phone]

## 2024-07-29 NOTE — PROGRESS NOTES
"  She feels that she is back to about 95% back to her baseline, after the steroids.     Sensory symptoms (numbness, tingling and paresthesia): No new symptoms. (Resolved)New onset of paresthesias/numbness around the left side of her face --previously at her baseline she had some periorbital numbness that would be intermittent--  Weakness: No new symptoms. At baseline she does have increased heaviness in her extremities.   Spasms: No new symptoms. At baseline she does have some joint and ankle and spasms  Vision problems (loss of vision or double vision):No symptoms.   Ambulatory dysfunction: No new symptoms.   Vertigo and Dizziness: No new complaints. She did had some balance complaints  Cognitive complaints: No new complaints. Brain fog is improving.   Speech complaints: No new complaints.   Depression: No new symptoms reported   Anxiety: Cymbalta is helping. Anxiety is much better.   Fatigue: Fatigue still there.   Bladder symptoms: No new complaints  Bowel symptoms: no complaints.   Lhermitte's sign: ++   Uhthoff's phenomenon: ++   MS hugs like symptoms:  a few episodes in the past           Headaches:   She is also complaining of headaches, which started around 6 months back, mostly in the center of her head, unsure what triggers it. Pressure like sensation, \"like brain is moving in the skull\". Light sensitivity and sound sensitivity present. Some nausea but no vomiting. The headache can start from a 1 and at the end of the evening, she is not able to sleep. Usually lasting for the whole day. She is getting the headaches about once a week depending upon \"what ever the trigger is\".   "

## 2024-07-29 NOTE — PROGRESS NOTES
Patient ID: Susan Villanueva is a 33 y.o. female.    Assessment/Plan:    No problem-specific Assessment & Plan notes found for this encounter.       {Assess/PlanSmartLinks:69094}       Subjective:    HPI    {[facility] Neurology HPI texts:24661}    {Common ambulatory SmartLinks:86696}         Objective:    There were no vitals taken for this visit.    Physical Exam    Neurological Exam    Gait    T25FW; 7.77 SECONDS.        ROS:    Review of Systems

## 2024-07-31 ENCOUNTER — TELEPHONE (OUTPATIENT)
Age: 34
End: 2024-07-31

## 2024-07-31 DIAGNOSIS — L24.7 IRRITANT CONTACT DERMATITIS DUE TO PLANTS, EXCEPT FOOD: Primary | ICD-10-CM

## 2024-07-31 RX ORDER — METHYLPREDNISOLONE 4 MG/1
TABLET ORAL
Qty: 21 EACH | Refills: 0 | Status: SHIPPED | OUTPATIENT
Start: 2024-07-31

## 2024-07-31 NOTE — TELEPHONE ENCOUNTER
Pt has poison ivy on legs and arms, very itchy.  Happen on Sunday wanted to notify provider apt offered not able to come to the office today.

## 2024-08-01 ENCOUNTER — TELEPHONE (OUTPATIENT)
Age: 34
End: 2024-08-01

## 2024-08-01 NOTE — TELEPHONE ENCOUNTER
Spoke with patient who had colpo done on 7/29.  She reports passing a yellow/orange thin looking clump.  She was advised this is normal, it is from the medication they use to help with the bleeding.   She was advised she may continue to pass small pieces of it or even have some coffee ground like discharge.   She understood and had no further questions or concerns at this time.

## 2024-08-02 PROCEDURE — 88344 IMHCHEM/IMCYTCHM EA MLT ANTB: CPT | Performed by: PATHOLOGY

## 2024-08-02 PROCEDURE — 88305 TISSUE EXAM BY PATHOLOGIST: CPT | Performed by: PATHOLOGY

## 2024-08-04 LAB
F5 GENE MUT ANL BLD/T: NORMAL
Lab: NORMAL

## 2024-12-13 ENCOUNTER — TELEPHONE (OUTPATIENT)
Dept: NEUROLOGY | Facility: CLINIC | Age: 34
End: 2024-12-13

## 2024-12-13 NOTE — TELEPHONE ENCOUNTER
Pt is scheduled for next Ocrevus infusion on 1/6/25. New PA is needed.     Submitted PA on Availity with clinicals. Pending auth # [id number]

## 2024-12-17 NOTE — TELEPHONE ENCOUNTER
Ocrevus is approved with [place] from 1/6/25 to 1/5/26 for 2 visits at [facility]. [id number]. [id number].

## 2024-12-18 ENCOUNTER — TELEPHONE (OUTPATIENT)
Dept: PERINATAL CARE | Facility: CLINIC | Age: 34
End: 2024-12-18

## 2024-12-18 NOTE — TELEPHONE ENCOUNTER
Called patient in regards to getting her scheduled for MFM Preconception visit with MFM MD. Referral notes state that patient would like to wait until January due to injection therapy for MS. Spoke with patient in regards to scheduling this appointment. Patient states that she would have to wait until at least 3 months after this injection therapy to attempt conceiving again, so states she would like to wait until April to schedule preconception. Informed patient that we would have to schedule her before 4/26/25 when her referral expires, patient verbalized understanding. Requested patient call the office back at [phone] if she has any further questions.

## 2024-12-24 ENCOUNTER — TELEPHONE (OUTPATIENT)
Dept: NEUROLOGY | Facility: CLINIC | Age: 34
End: 2024-12-24

## 2024-12-24 DIAGNOSIS — Z11.59 ENCOUNTER FOR SCREENING FOR VIRAL DISEASE: ICD-10-CM

## 2024-12-24 DIAGNOSIS — Z11.1 TUBERCULOSIS SCREENING: ICD-10-CM

## 2024-12-24 DIAGNOSIS — Z79.899 IMMUNODEFICIENCY DUE TO TREATMENT WITH IMMUNOSUPPRESSIVE MEDICATION  (HCC): ICD-10-CM

## 2024-12-24 DIAGNOSIS — G35 MULTIPLE SCLEROSIS (HCC): Primary | ICD-10-CM

## 2024-12-24 DIAGNOSIS — D84.821 IMMUNODEFICIENCY DUE TO TREATMENT WITH IMMUNOSUPPRESSIVE MEDICATION  (HCC): ICD-10-CM

## 2024-12-24 NOTE — TELEPHONE ENCOUNTER
Pt is scheduled for Ocrevus on 1/6/25.     Ocrevus is approved with [facility] from 1/6/25 to 1/5/26 for 2 visits at [facility]. [id number]. [id number].    The following labs are required and have been entered as appropriate per protocol:    CBC  CMP  HCG  Immunoglobulins  HIV  Quantiferon TB gold  Acute hepatitis panel    MyChart message sent to pt reminding them of needed labs.    Awaiting labs.

## 2024-12-29 ENCOUNTER — TELEMEDICINE (OUTPATIENT)
Dept: OTHER | Facility: HOSPITAL | Age: 34
End: 2024-12-29
Payer: COMMERCIAL

## 2024-12-29 DIAGNOSIS — J06.9 VIRAL URI: ICD-10-CM

## 2024-12-29 DIAGNOSIS — G35 MULTIPLE SCLEROSIS (HCC): Primary | ICD-10-CM

## 2024-12-29 PROCEDURE — 99213 OFFICE O/P EST LOW 20 MIN: CPT | Performed by: PHYSICIAN ASSISTANT

## 2024-12-29 NOTE — LETTER
December 29, 2024     Patient: Susan Villanueva  YOB: 1990  Date of Visit: 12/29/2024      To Whom it May Concern:    Susan Villanueva is under my professional care. Susan was seen in my office on 12/29/2024. Susan may return to work on 12/31/2024, or sooner if feeling better .    If you have any questions or concerns, please don't hesitate to call.         Sincerely,          Danielle Lee Seiple, PA-C        CC: No Recipients

## 2024-12-29 NOTE — PROGRESS NOTES
Virtual Regular Visit  Name: Susan Villanueva      : 1990      MRN: [id number]  Encounter Provider: Danielle Lee Seiple, PA-C  Encounter Date: 2024   Encounter department: VIRTUAL CARE       Verification of patient location:  Patient is located at Home in the following state in which I hold an active license [place] :  Assessment & Plan  Multiple sclerosis (HCC)         Viral URI         Susan has significant PMH of MS on immunosuppressive medications with the start of viral URI symptoms.  Advised rest, hydration, antipyretics PRN. Suspect viral etiology due to sneezing, etc.  Advised follow up with PCP or urgent care with fever, worsening of symptoms.  Reassurance provided.     Encounter provider Danielle Lee Seiple, PA-C    The patient was identified by name and date of birth. Susan Villanueva was informed that this is a telemedicine visit and that the visit is being conducted through the Epic Embedded platform. She agrees to proceed..  My office door was closed. No one else was in the room.  She acknowledged consent and understanding of privacy and security of the video platform. The patient has agreed to participate and understands they can discontinue the visit at any time.    Patient is aware this is a billable service.     History was obtained from: History obtained from: patient  History of Present Illness     Susan presents via virtual visit for evaluation of a swollen gland with tenderness under her right jaw that started yesterday afternoon. Has pain and pressure around the eyes and over cheeks- started this morning. Patient is on immunosuppressive medication for MS. Next infusion of MS medication is scheduled for 2024.   Started sneezing yesterday and has felt fatigued. Symptoms are worse than they are at baseline with MS.   Normal appetite and drinking. Normal urine output and bowel movements.  Denies N/V/D, fever, chills, night sweats.       Review of Systems   Constitutional:   Negative for activity change, appetite change, chills, fatigue and fever.   HENT:  Positive for sinus pressure, sinus pain and sneezing. Negative for congestion, ear pain, rhinorrhea, sore throat and trouble swallowing.         Swollen lymph nodes in neck   Eyes:  Negative for pain, discharge, redness and visual disturbance.   Respiratory:  Negative for cough, shortness of breath and wheezing.    Cardiovascular:  Negative for chest pain and palpitations.   Gastrointestinal:  Negative for abdominal pain, constipation, diarrhea, nausea and vomiting.   Genitourinary:  Negative for dysuria and hematuria.   Musculoskeletal:  Negative for arthralgias and back pain.   Skin:  Negative for color change and rash.   Neurological:  Negative for seizures and syncope.   All other systems reviewed and are negative.      Objective   There were no vitals taken for this visit.    Physical Exam  Constitutional:       General: She is awake.      Appearance: Normal appearance. She is well-developed, well-groomed and normal weight. She is not ill-appearing.   HENT:      Head: Normocephalic.      Right Ear: External ear normal.      Left Ear: External ear normal.      Nose: Nose normal. No congestion or rhinorrhea.      Mouth/Throat:      Lips: Pink. No lesions.      Mouth: Mucous membranes are moist.   Eyes:      General: Lids are normal.   Pulmonary:      Effort: Pulmonary effort is normal. No respiratory distress.   Skin:     Coloration: Skin is not pale.      Findings: No rash.   Neurological:      Mental Status: She is alert.   Psychiatric:         Attention and Perception: Attention normal.         Speech: Speech normal.         Behavior: Behavior is cooperative.         Visit Time  Total Visit Duration: 11 minutes not including the time spent for establishing the audio/video connection.

## 2024-12-30 ENCOUNTER — APPOINTMENT (OUTPATIENT)
Dept: LAB | Facility: MEDICAL CENTER | Age: 34
End: 2024-12-30
Payer: COMMERCIAL

## 2024-12-30 DIAGNOSIS — G35 MULTIPLE SCLEROSIS (HCC): ICD-10-CM

## 2024-12-30 DIAGNOSIS — Z11.1 TUBERCULOSIS SCREENING: ICD-10-CM

## 2024-12-30 DIAGNOSIS — Z11.59 ENCOUNTER FOR SCREENING FOR VIRAL DISEASE: ICD-10-CM

## 2024-12-30 DIAGNOSIS — D84.821 IMMUNODEFICIENCY DUE TO TREATMENT WITH IMMUNOSUPPRESSIVE MEDICATION  (HCC): ICD-10-CM

## 2024-12-30 DIAGNOSIS — Z79.899 IMMUNODEFICIENCY DUE TO TREATMENT WITH IMMUNOSUPPRESSIVE MEDICATION  (HCC): ICD-10-CM

## 2024-12-30 LAB
ALBUMIN SERPL BCG-MCNC: 4.5 G/DL (ref 3.5–5)
ALP SERPL-CCNC: 60 U/L (ref 34–104)
ALT SERPL W P-5'-P-CCNC: 19 U/L (ref 7–52)
ANION GAP SERPL CALCULATED.3IONS-SCNC: 8 MMOL/L (ref 4–13)
AST SERPL W P-5'-P-CCNC: 18 U/L (ref 13–39)
BASOPHILS # BLD AUTO: 0.04 THOUSANDS/ΜL (ref 0–0.1)
BASOPHILS NFR BLD AUTO: 1 % (ref 0–1)
BILIRUB SERPL-MCNC: 0.5 MG/DL (ref 0.2–1)
BUN SERPL-MCNC: 13 MG/DL (ref 5–25)
CALCIUM SERPL-MCNC: 9.6 MG/DL (ref 8.4–10.2)
CHLORIDE SERPL-SCNC: 104 MMOL/L (ref 96–108)
CO2 SERPL-SCNC: 27 MMOL/L (ref 21–32)
CREAT SERPL-MCNC: 0.92 MG/DL (ref 0.6–1.3)
EOSINOPHIL # BLD AUTO: 0.15 THOUSAND/ΜL (ref 0–0.61)
EOSINOPHIL NFR BLD AUTO: 3 % (ref 0–6)
ERYTHROCYTE [DISTWIDTH] IN BLOOD BY AUTOMATED COUNT: 12.4 % (ref 11.6–15.1)
GFR SERPL CREATININE-BSD FRML MDRD: 82 ML/MIN/1.73SQ M
GLUCOSE P FAST SERPL-MCNC: 87 MG/DL (ref 65–99)
HAV IGM SER QL: NORMAL
HBV CORE IGM SER QL: NORMAL
HBV SURFACE AG SER QL: NORMAL
HCG SERPL QL: NEGATIVE
HCT VFR BLD AUTO: 43.8 % (ref 34.8–46.1)
HCV AB SER QL: NORMAL
HGB BLD-MCNC: 14.7 G/DL (ref 11.5–15.4)
HIV 1+2 AB+HIV1 P24 AG SERPL QL IA: NORMAL
HIV 2 AB SERPL QL IA: NORMAL
HIV1 AB SERPL QL IA: NORMAL
HIV1 P24 AG SERPL QL IA: NORMAL
IGA SERPL-MCNC: 234 MG/DL (ref 66–433)
IGG SERPL-MCNC: 905 MG/DL (ref 635–1741)
IGM SERPL-MCNC: 92 MG/DL (ref 45–281)
IMM GRANULOCYTES # BLD AUTO: 0.01 THOUSAND/UL (ref 0–0.2)
IMM GRANULOCYTES NFR BLD AUTO: 0 % (ref 0–2)
LYMPHOCYTES # BLD AUTO: 1.57 THOUSANDS/ΜL (ref 0.6–4.47)
LYMPHOCYTES NFR BLD AUTO: 32 % (ref 14–44)
MCH RBC QN AUTO: 31 PG (ref 26.8–34.3)
MCHC RBC AUTO-ENTMCNC: 33.6 G/DL (ref 31.4–37.4)
MCV RBC AUTO: 92 FL (ref 82–98)
MONOCYTES # BLD AUTO: 0.58 THOUSAND/ΜL (ref 0.17–1.22)
MONOCYTES NFR BLD AUTO: 12 % (ref 4–12)
NEUTROPHILS # BLD AUTO: 2.5 THOUSANDS/ΜL (ref 1.85–7.62)
NEUTS SEG NFR BLD AUTO: 52 % (ref 43–75)
NRBC BLD AUTO-RTO: 0 /100 WBCS
PLATELET # BLD AUTO: 263 THOUSANDS/UL (ref 149–390)
PMV BLD AUTO: 10.5 FL (ref 8.9–12.7)
POTASSIUM SERPL-SCNC: 4.2 MMOL/L (ref 3.5–5.3)
PROT SERPL-MCNC: 6.9 G/DL (ref 6.4–8.4)
RBC # BLD AUTO: 4.74 MILLION/UL (ref 3.81–5.12)
SODIUM SERPL-SCNC: 139 MMOL/L (ref 135–147)
WBC # BLD AUTO: 4.85 THOUSAND/UL (ref 4.31–10.16)

## 2024-12-30 PROCEDURE — 82784 ASSAY IGA/IGD/IGG/IGM EACH: CPT

## 2024-12-30 PROCEDURE — 87389 HIV-1 AG W/HIV-1&-2 AB AG IA: CPT

## 2024-12-30 PROCEDURE — 80053 COMPREHEN METABOLIC PANEL: CPT

## 2024-12-30 PROCEDURE — 85025 COMPLETE CBC W/AUTO DIFF WBC: CPT

## 2024-12-30 PROCEDURE — 86480 TB TEST CELL IMMUN MEASURE: CPT

## 2024-12-30 PROCEDURE — 84703 CHORIONIC GONADOTROPIN ASSAY: CPT

## 2024-12-30 PROCEDURE — 80074 ACUTE HEPATITIS PANEL: CPT

## 2024-12-30 PROCEDURE — 36415 COLL VENOUS BLD VENIPUNCTURE: CPT

## 2024-12-30 NOTE — TELEPHONE ENCOUNTER
Pt called in on triage line re: possible illness and upcoming Ocrevus infusion. Pt states that she went to Urgent Care on Sunday, 12/29, because she noticed a swollen gland on the right side of her neck and she was feeling slightly under the weather.    She has an upcoming Ocrevus infusion on 1/6/25, which she must get blood work done in preparation for. She is asking Dr. House if she should still go for the blood work and have the infusion if she is having the swollen gland and feeling under the weather?    She was not prescribed any ABX or any medications from the Urgent care.    Please advise. Thank you.

## 2024-12-30 NOTE — TELEPHONE ENCOUNTER
Pt called regarding below.  She also wanted to make you aware that she had her lab work completed this am.  These are still pending    She also states that scheduled for MRI's 1/25.  Her f/u appt with you is scheduled for 1/29    Please advise on ocrevus infusion   [phone] to leave detailed message

## 2024-12-31 LAB
GAMMA INTERFERON BACKGROUND BLD IA-ACNC: 0.01 IU/ML
M TB IFN-G BLD-IMP: NEGATIVE
M TB IFN-G CD4+ BCKGRND COR BLD-ACNC: 0.06 IU/ML
M TB IFN-G CD4+ BCKGRND COR BLD-ACNC: 0.07 IU/ML
MITOGEN IGNF BCKGRD COR BLD-ACNC: 9.99 IU/ML

## 2024-12-31 NOTE — TELEPHONE ENCOUNTER
Lab results in chart, TB testing still pending.     Per below, pt went to  on Sunday 12/29/24 for swollen gland on side of neck, feeling slightly under the weather. Pt was not given any medications or abx.     Please advise if pt is cleared to proceed with Ocrevus infusion on 1/6/25 if not feeling ill and if TB results are negative.

## 2024-12-31 NOTE — TELEPHONE ENCOUNTER
Yes, okay to proceed if she is feeling well and TB testing is negative.     Thank you,     Dr. Harsh MD.   12/31/24   2:36 PM

## 2025-01-02 NOTE — TELEPHONE ENCOUNTER
"Spoke with pt, she reports she is feeling \"pretty good.\" Notes swelling in her gland has improved. Advised pt no concerns with labs noted from provider, okay to proceed with infusion. Pt verbalizes understanding.   "

## 2025-01-06 ENCOUNTER — RESULTS FOLLOW-UP (OUTPATIENT)
Age: 35
End: 2025-01-06

## 2025-01-06 ENCOUNTER — HOSPITAL ENCOUNTER (OUTPATIENT)
Dept: INFUSION CENTER | Facility: CLINIC | Age: 35
Discharge: HOME | End: 2025-01-06
Payer: COMMERCIAL

## 2025-01-06 VITALS
DIASTOLIC BLOOD PRESSURE: 64 MMHG | SYSTOLIC BLOOD PRESSURE: 133 MMHG | HEART RATE: 99 BPM | RESPIRATION RATE: 17 BRPM | TEMPERATURE: 98.1 F

## 2025-01-06 DIAGNOSIS — G35 MULTIPLE SCLEROSIS (HCC): Primary | ICD-10-CM

## 2025-01-06 PROCEDURE — 96415 CHEMO IV INFUSION ADDL HR: CPT

## 2025-01-06 PROCEDURE — 96413 CHEMO IV INFUSION 1 HR: CPT

## 2025-01-06 PROCEDURE — 96367 TX/PROPH/DG ADDL SEQ IV INF: CPT

## 2025-01-06 RX ORDER — SODIUM CHLORIDE 9 MG/ML
20 INJECTION, SOLUTION INTRAVENOUS ONCE
OUTPATIENT
Start: 2025-06-28

## 2025-01-06 RX ORDER — ACETAMINOPHEN 325 MG/1
650 TABLET ORAL ONCE
Status: COMPLETED | OUTPATIENT
Start: 2025-01-06 | End: 2025-01-06

## 2025-01-06 RX ORDER — ACETAMINOPHEN 325 MG/1
650 TABLET ORAL ONCE
OUTPATIENT
Start: 2025-06-28

## 2025-01-06 RX ORDER — SODIUM CHLORIDE 9 MG/ML
20 INJECTION, SOLUTION INTRAVENOUS ONCE
Status: COMPLETED | OUTPATIENT
Start: 2025-01-06 | End: 2025-01-06

## 2025-01-06 RX ADMIN — SODIUM CHLORIDE 100 MG: 0.9 INJECTION, SOLUTION INTRAVENOUS at 09:31

## 2025-01-06 RX ADMIN — ACETAMINOPHEN 650 MG: 325 TABLET, FILM COATED ORAL at 09:31

## 2025-01-06 RX ADMIN — FAMOTIDINE 20 MG: 10 INJECTION, SOLUTION INTRAVENOUS at 08:45

## 2025-01-06 RX ADMIN — OCRELIZUMAB 600 MG: 300 INJECTION INTRAVENOUS at 10:29

## 2025-01-06 RX ADMIN — DIPHENHYDRAMINE HYDROCHLORIDE 50 MG: 50 INJECTION, SOLUTION INTRAMUSCULAR; INTRAVENOUS at 09:07

## 2025-01-06 RX ADMIN — SODIUM CHLORIDE 20 ML/HR: 9 INJECTION, SOLUTION INTRAVENOUS at 08:45

## 2025-01-06 NOTE — PROGRESS NOTES
Susan Villanueva presents today for Ocrevus infusion, offers no complaints. States during first infusion she had some scalp itching and pre-meds are ordered.  Vitals taken as ordered throughout infusion and patient tolerated infusion well without complaint or complication.  PIV removed.  PIV removed.  Patient aware of next appt on 7/11/2025 at 7:30 AM. AVS declined and pt D/C home in stable condition.

## 2025-01-25 ENCOUNTER — HOSPITAL ENCOUNTER (OUTPATIENT)
Dept: MRI IMAGING | Facility: HOSPITAL | Age: 35
Discharge: HOME | End: 2025-01-25
Attending: PSYCHIATRY & NEUROLOGY
Payer: COMMERCIAL

## 2025-01-25 DIAGNOSIS — G35 MULTIPLE SCLEROSIS (HCC): ICD-10-CM

## 2025-01-25 PROCEDURE — 70553 MRI BRAIN STEM W/O & W/DYE: CPT

## 2025-01-25 PROCEDURE — A9585 GADOBUTROL INJECTION: HCPCS | Performed by: PSYCHIATRY & NEUROLOGY

## 2025-01-25 PROCEDURE — 72156 MRI NECK SPINE W/O & W/DYE: CPT

## 2025-01-25 RX ORDER — GADOBUTROL 604.72 MG/ML
9 INJECTION INTRAVENOUS
Status: COMPLETED | OUTPATIENT
Start: 2025-01-25 | End: 2025-01-25

## 2025-01-25 RX ADMIN — GADOBUTROL 9 ML: 604.72 INJECTION INTRAVENOUS at 08:52

## 2025-01-27 ENCOUNTER — TELEPHONE (OUTPATIENT)
Age: 35
End: 2025-01-27

## 2025-01-27 NOTE — TELEPHONE ENCOUNTER
Pt had MRI brain and C-spine on 1/25.  Significant finding were found on MRI c-spine     Please review

## 2025-01-27 NOTE — TELEPHONE ENCOUNTER
"Reviewed the MRI of the C spine,     According to the report, \"Redemonstrated demyelinating lesions at C3-C4 and C6-C7, noting the area at C6-C7 is a bit more defined/conspicuous compared to MRI cervical spine 6/12/2024. However, no evidence of enhancement to suggest active inflammation/demyelination. \"    Would update the patient through my chart message.     Thank you,     Dr. Harsh MD.   01/27/25   1:48 PM     "

## 2025-01-27 NOTE — TELEPHONE ENCOUNTER
Radiology called to report significant findings for patient MRI Lumbar spine  completed on 1/25/2025    Please make provider aware of results     Thank you!

## 2025-01-28 ENCOUNTER — RESULTS FOLLOW-UP (OUTPATIENT)
Age: 35
End: 2025-01-28

## 2025-01-29 ENCOUNTER — TELEMEDICINE (OUTPATIENT)
Age: 35
End: 2025-01-29
Payer: COMMERCIAL

## 2025-01-29 VITALS — HEIGHT: 64 IN | WEIGHT: 194 LBS | BODY MASS INDEX: 33.12 KG/M2

## 2025-01-29 DIAGNOSIS — G43.909 MIGRAINE HEADACHE: ICD-10-CM

## 2025-01-29 DIAGNOSIS — G35 MULTIPLE SCLEROSIS (HCC): Primary | ICD-10-CM

## 2025-01-29 PROCEDURE — 99215 OFFICE O/P EST HI 40 MIN: CPT | Performed by: PSYCHIATRY & NEUROLOGY

## 2025-01-29 NOTE — PROGRESS NOTES
NEUROLOGY OUTPATIENT - TELEMEDICINE FOLLOW UP PATIENT VISIT NOTE    Name: Susan Villanueva       : 1990       MRN: [id number]   Encounter Provider: Meet Fraser MD   Encounter Date: 2025  Encounter department: [facility] [place]      The patient was identified by name and date of birth.   Ms. Villanueva  was informed that this is a telemedicine visit and that the visit is being conducted through the Epic Embedded platform.  Ms. Villanueva    agrees to proceed..  My office door was closed. No one else was in the room.   Ms. Villanueva   acknowledged consent and understanding of privacy and security of the video platform.  Ms. Villanueva   agreed to participate and understands they can discontinue the visit at any time.     Verification of patient location:Patient is located at office in the following state in which I hold an active license; [place].     Patient is aware this is a billable service.       History of Present Illness       Ms. Villanueva is a 34 y.o. female presenting with Multiple Sclerosis    MS HISTORY:  Symptoms onset:   Initial symptoms: Left-sided facial numbness (Dr. Camacho) [facility]  MS diagnosis: 2019  Disease course at onset: Relapsing remitting Multiple Sclerosis   Current disease course: Relapsing remitting Multiple Sclerosis   Subsequent symptoms:    2023-dizziness/vertiginous symptoms s/p 5 days of IVMP.    6/10/2024-numbness and tingling in the left upper and lower left, hands   Family history of MS: No  Spinal tap: positive--11 bands, 0 in the serum. (Positive) protein 40 and CSF, WBC count 2, RBC count 5, and CSF glucose was 64   Prior DMT's for MS: Copaxone (stopped secondary to the side effects)  JCV test: 0.15 negative   [facility]  MOG Ab:not tested  Last MRI of the brain:     2024-new enhancing lesions noted  2023-new enhancing lesion noted.   Last MRI of the C spine:   2023-DDD, no lesions  noted.   6/12/2024-new*abnormality noted  Last MRI of the T spine:12/6/2023-DDD, no lesions noted.           INTERIM HISTORY:  Ms. Villanueva with relapsing-remitting multiple sclerosis, presents for follow-up regarding multiple sclerosis management.    Ms. Villanueva has been undergoing treatment with Ocrevus for relapsing-remitting multiple sclerosis and feel good after infusions, with no noticeable reactions to the medication. Some discomfort from the steroids used during treatment is described as a 'rise and fall' effect, but it does not significantly impact daily life. Recent MRI results show stability with no new changes in the brain or spine, which is a relief. Concerns about lesions in the brain stem were addressed, confirming these are not new findings. She inquired about the term 'apparent diffusion coefficient' in the MRI report, which was explained as a sequence used in imaging.    No new numbness, tingling, weakness, or muscle spasms are reported. Previously experienced numbness in the face and foot is currently absent. A low sugar, low carb diet has improved overall well-being and reduced muscle cramps. No falls, vision problems, vertigo, or dizziness are noted. Occasional brain fog occurs, often related to stress, but resolves quickly. They are taking Cymbalta for anxiety, which is effective with no negative reactions. Fatigue is present but improved with dietary changes.    She experience regular constipation and manage it with fiber supplements. She is also considering a job change due to workplace stress but has support from their current company. Persistent headaches are noted, and sumatriptan was ineffective. Allergies or sleep pattern changes are suspected as triggers, and she is  taking magnesium supplements.          PRIOR NOTES:  7/29/2024   DMT: 7/9/2024--7/23/2024-Ocrevus infusion  Side effects: She did had some itching which resolved with Benadryl during the first infusion which she did not  "had any side effects from the second half dose  Baseline safety labs: Stable    New symptoms: Denies having any new symptoms  Progression: No concerning symptoms  New MRI findings: MRI of the brain from June was showing new disease activity for which she underwent 3 days of IV Solu-Medrol    6/24/24-Steroids infusion for her underlying symptoms (left sided paresthesia and numbness).     MS ROS:   Sensory symptoms (numbness, tingling and paresthesia): No new symptoms. (Resolved)New onset of paresthesias/numbness around the left side of her face --previously at her baseline she had some periorbital numbness that would be intermittent--  Weakness: No new symptoms. At baseline she does have increased heaviness in her extremities.   Spasms: No new symptoms. At baseline she does have some joint and ankle and spasms  Vision problems (loss of vision or double vision):No symptoms.   Ambulatory dysfunction: No new symptoms.   Vertigo and Dizziness: No new complaints. She did had some balance complaints  Cognitive complaints: No new complaints. Brain fog is improving.   Speech complaints: No new complaints.   Depression: No new symptoms reported   Anxiety: Cymbalta is helping. Anxiety is much better.   Fatigue: Fatigue still there.   Bladder symptoms: No new complaints  Bowel symptoms: no complaints.   Lhermitte's sign: ++   Uhthoff's phenomenon: ++   MS hugs like symptoms:  a few episodes in the past           Headaches:   She is also complaining of headaches, which started around 6 months back, mostly in the center of her head, unsure what triggers it. Pressure like sensation, \"like brain is moving in the skull\". Light sensitivity and sound sensitivity present. Some nausea but no vomiting. The headache can start from a 1 and at the end of the evening, she is not able to sleep. Usually lasting for the whole day. She is getting the headaches about once a week depending upon \"what ever the trigger is\"    6/20/2024  Ms. Villanueva " "is coming in for an urgent appointment after recent MS exacerbation consisting of numbness on the left side of her face along with her upper extremity.  She also underwent MRI of the brain and cervical spine which were showing new enhancing lesions for which she underwent 3 days of IV Solu-Medrol but she feels that her symptoms which initially improved are starting to get worse again      In regards to her sensory symptoms she mentions that she feels that the left side of her face is having \" cold drooling episodes\" like someone has given her Novocain.  She is also feeling a lot of heaviness in her body and even a short amount of exertion would cause her symptoms to get worse.  She is also complaining of brain fog and increased anxiety and worsening fatigue.    It seems that the most recent relapses take in her financial and emotional toll on her.  She had to pay a lot of money for the MRIs, $300 co-pay as well as paying the doctor's co-pay.      MS ROS:   Sensory symptoms (numbness, tingling and paresthesia): New onset of paresthesias/numbness around the left side of her face --previously at her baseline she had some periorbital numbness that would be intermittent  Weakness: She does have increased heaviness in her extremities.   Spasms: Some joint and ankle and spasms  Vision problems (loss of vision or double vision): On Tuesday she had increased blurred vision.   Ambulatory dysfunction: Increased heaviness all over her body affecting her ambulation   Vertigo and Dizziness: She has some complaints, popping sounds. Vertigo.    Cognitive complaints: Brain fog since the last clinic relapse which is affecting her work   Speech complaints: Sometimes she would have word finding difficulties but nothing significant  Depression: No new symptoms reported   Anxiety: More anxiety since her clinical relapse.   Fatigue: Fatigue seems to be worse since her most recent clinical relapse  Bladder symptoms: No new " complaints  Bowel symptoms: Some constipation  Lhermitte's sign: ++   Uhthoff's phenomenon: ++   MS hugs like symptoms:  a few episodes in the past        Since last visit, Ms. Villanueva reports that their symptoms are stable.  During her last visit it was discussed that she needs to be on a disease modifying therapy and  we discussed about Ocrevus, rituximab and Kesimpta but due to the fact that the patient wanted to get pregnant we decided to delay the start of the medication    Since last clinic visit, Ms. Villanueva denies any new focal neurologic symptoms suggestive of inflammatory disease activity. Specifically patient denies any episodes lasting greater than 24 hours of painful vision loss, double vision, oscillopsia, slurred speech, vertigo, incoordination, focal weakness, bowel or bladder incontinence, focal sensory loss, or Lhermitte's.     Progression: stable    She is mentioning that she is having once a week headaches that usually happen at the very middle part of the brain.  These headaches usually go away by the time she is sleeps and wake up the next morning but only in 1 episode the headaches lasted for about a day.  She mentioned that there is some light sensitivity along with some sensitivity, some nausea but no vomiting.  She was initially thinking that these headaches might be triggered by caffeine withdrawal but she has not been able to find a trigger for these headaches      MS ROS:   Sensory symptoms (numbness, tingling and paresthesia): At baseline she has some numbness in the left perioral region which is intermittent and usually goes away within a few minutes  Weakness: No new symptoms reported   Spasms: No new symptoms reported   Vision problems (loss of vision or double vision): No new symptoms reported (except with her headaches)  Ambulatory dysfunction: No new symptoms reported   Vertigo and Dizziness: No new symptoms reported   Cognitive complaints: No new complaints  Speech complaints:  Sometimes she would have word finding difficulties but nothing significant  Depression: No new symptoms reported   Anxiety: No new symptoms reported   Fatigue: Fatigue is present at baseline and some days she has to sleep at least 8 hours to continue working during the day  Bladder symptoms: No new complaints  Bowel symptoms: Some constipation  Lhermitte's sign: ++   Uhthoff's phenomenon: ++   MS hugs like symptoms:  a few episodes in the past        12/5/2023-presented to the ER with 1 week history of intermittent vertigo for which she underwent MRI of the brain which was showing enhancing lesions.  Previously she was diagnosed with multiple sclerosis when she presented with left-sided face numbness and was diagnosed based on the MRI findings and positive spinal tap.  She was initially put on Copaxone but had side effects.   MS ROS:   Sensory symptoms (numbness, tingling and paresthesia):  She does have some numbness in the left side of the lip, which comes and goes.   Weakness: No new symptoms reported   Spasms: No new symptoms reported   Vision problems (loss of vision or double vision): No new symptoms reported   Ambulatory dysfunction: No new symptoms reported   Vertigo and Dizziness: No new symptoms reported   Cognitive complaints: Improved since the steroids   Speech complaints: Improved since the steroids.    Depression: No new symptoms reported   Anxiety: No new symptoms reported   Fatigue: Much better since the hospital --no day time naps  Bladder symptoms: last 6 months, she is having increased urgency. 3-5 accidents.   Bowel symptoms: No new symptoms reported   Lhermitte's sign: ++   Uhthoff's phenomenon: ++   MS hugs like symptoms:  a few episodes in the past    CURRENT OUTPATIENT MEDICATIONS:    Susan Villanueva has a current medication list which includes the following prescription(s): alprazolam, cholecalciferol, duloxetine, krill oil, magnesium, multiple vitamin, norethindrone, ocrelizumab,  Yes "probiotic product, and turmeric.     Objective     PHYSICAL EXAMINATION:   VITAL SIGNS:  height is 5' 4\" (1.626 m) and weight is 88 kg (194 lb).        NEUROLOGICAL EXAMINATION:    MENTAL STATUS EXAM: Alert, oriented to time place and person,     Unable to assess as this is a telemedicine visit          DIAGNOSTIC STUDIES:   PERTINENT LABS:     Lab Results   Component Value Date    WBC 4.85 12/30/2024     Lab Results   Component Value Date    HGB 14.7 12/30/2024     Lab Results   Component Value Date     12/30/2024     Lab Results   Component Value Date    LYMPHSABS 1.57 12/30/2024     No results found for: \"LABLYMP\"  Lab Results   Component Value Date    EOSABS 0.15 12/30/2024     No components found for: \"NEUTROPHILS\"    No results found for: \"LABMONO\"         No results found for: \"LABGLUC\"  Lab Results   Component Value Date    CREATININE 0.92 12/30/2024     Lab Results   Component Value Date    AST 18 12/30/2024     Lab Results   Component Value Date    ALT 19 12/30/2024     Lab Results   Component Value Date    ALKPHOS 60 12/30/2024     Lab Results   Component Value Date    AST 18 12/30/2024         Lab Results   Component Value Date    TSH 1.79 09/02/2020    HEPBSAG Non-reactive 12/30/2024    HEPBSAB 10.70 06/08/2024    HEPCAB Non-reactive 12/30/2024            NEUROIMAGING:  Results for orders placed during the hospital encounter of 01/25/25    MRI brain w wo contrast    Impression  Overall stable moderate burden of demyelinating disease without findings to indicate active inflammation/demyelination.    Workstation performed: [id number]      Results for orders placed in visit on 12/04/23    MRI brain w wo contrast      MRI brain w wo contrast            Results for orders placed during the hospital encounter of 06/13/24    MRI brain MS wo and w contrast    Impression  Compared to 11/30/2023:  Evidence of new activity in a chronic tiny left frontal lesion and new active right frontal and temporal " demyelinating lesions.    Resolved findings of activity in left temporal and right frontoparietal and periatrial lesions.    Workstation performed: [id number]      Results for orders placed during the hospital encounter of 11/30/23    MRI brain MS wo and w contrast    Impression  Multiple periventricular, subcortical, and pericallosal white matter demyelinating lesions consistent with the patient's history of multiple sclerosis, moderate plaque burden. New enhancing lesions within the right frontoparietal, left temporal, right  periatrial, and right centrum semiovale white matter as detailed compatible with acute demyelination compared to the prior outside 1/22/2021 examination. The larger lesions demonstrate surrounding vasogenic edema. There are additional new subcentimeter  lesions without enhancement including a right pontine focus.    The study was marked in EPIC for immediate notification.        Workstation performed: [id number]       Results for orders placed during the hospital encounter of 01/25/25    MRI cervical spine w wo contrast    Impression  1. Redemonstrated demyelinating lesions at C3-C4 and C6-C7, noting the area at C6-C7 is a bit more defined/conspicuous compared to MRI cervical spine 6/12/2024. However, no evidence of enhancement to suggest active inflammation/demyelination.    2. No significant spondylotic changes.    The study was marked in EPIC for significant notification.          Workstation performed: [id number]      Results for orders placed during the hospital encounter of 06/12/24    MRI cervical spine w wo contrast    Impression  -New STIR signal abnormality within the dorsal column at C3-C4 and left lateral cord at C6-C7 suspicious for demyelinating plaque. No pathologic enhancement to suggest active demyelination.    -No significant spinal canal or foraminal narrowing.    The study was marked in EPIC for significant notification.      Workstation performed: [id number]      Results for  orders placed during the hospital encounter of 12/05/23    MRI cervical spine w wo contrast    Impression  No evidence of cervical spinal cord demyelination.    No stenosis.          Workstation performed: [id number]      Results for orders placed during the hospital encounter of 12/05/23    MRI thoracic spine w wo contrast    Impression  No evidence of thoracic cord demyelination.    Single small T9-T10 disc herniation causing minimal central canal narrowing.      Workstation performed: [id number]      She had MRI of the orbits and MRI of the brain with contrast. It shows enhancement in the optic nerves as well as enhancement also seen on right posterior frontal white matter area and they did notice hyperintensity signal involving prince and lesser extent to midbrain and pontomedullary junction. Please review official report for details.    She had MRI of the thoracic spine. It show a tiny focal area of probable demyelination involving the right anterolateral aspect of the thoracic cord. Please review official report for details.      MRI BRAIN W CONTRAST 10/2/2019  Narrative  MRI brain and orbits with and without contrast    History: Left-sided facial numbness. Episode of double vision.    Comparison: MRI brain/MRA head and neck    Technique: Multiplanar MRI of the brain with special attention to  the orbits was performed utilizing short and long TE sequences.  Scanning was performed prior to and following the administration of  8.5 cc of intravenous contrast material.    Report:    Demonstrated are periventricular/callosal septal interface focal  areas of T2 and FLAIR hyperintensity suspicious for multiple  sclerosis. Some of this abnormal signal involves the pontomedullary  junction, prince, and to a lesser extent the midbrain.    Cerebellar tonsils terminate above the foramen magnum. The  visualized intracranial, canalicular, and intraorbital portions of  the optic nerves are unremarkable in appearance. There is,  however,  enhancement involving the distal infraorbital as well as within  portions of the optic nerves lesser extent findings are suspicious  for demyelination. The optic chiasm itself is unremarkable with no  abnormal enhancement. It should be noted that there is curvilinear  enhancement involving a single posterior frontal white matter lesion  in the right centrum semiovale.    Flow voids are unremarkable. Dural venous sinuses are unremarkable.    There is minimal to moderate ethmoid air cell thickening. There is  minimal bilateral frontal sinus because of thickening.    Impression  Impression:    Multiple sclerosis with active demyelination including enhancement  of the optic nerves. Neuromyelitis spectrum disorder is less likely  in the absence of spinal cord findings.        Assessment & Plan  Multiple sclerosis (HCC)  Ms. Villanueva is a very pleasant 34 y.o. female presenting with multiple sclerosis follow-up. Briefly patient was diagnosed with multiple sclerosis based on left-sided facial numbness after undergoing an MRI of the brain which was showing lesions consistent with MS and positive spinal tap and meets the 2017 Pfeiffer's criteria for relapsing remitting MS. Most MRI showing no new lesions. Ocrevus is well-tolerated with no side effects. No new symptoms such as numbness, tingling, weakness, or muscle spasms. Occasional stress-related brain fog is manageable. Cymbalta effectively manages anxiety without adverse effects. Discussed potential cost reduction by switching to Ocrevus injection and advised against neck manipulation during chiropractic care due to vascular injury risk.    MULTIPLE SCLEROSIS PLAN:      Disease Modifying Therapy:   Continue with Ocrevus as your disease modifying therapy for MS. Side effects have already been discussed with the patient in detail. Consider switching to Ocrevus injection form to reduce costs.Would assist with insurance and reimbursement issues as needed.  Safety  labs:   stable  Neuroimaging:   Would hold off to the neuroimaging including MR of the brain, C and T spine for now.--Last neuroimaging was stable.   Supplements:   Vit D and other supplements as recommended.  Vit D goal is above 40.   Exercise:   Regular exercise was recommended (At least 150 min a week)  Diet:   Mediterranean diet        Migraine headache  Headaches potentially related to allergies or sleep pattern fluctuations. Sumatriptan was ineffective. Exploring triggers and interested in supplements for management. Discussed riboflavin, magnesium, and Migravent. Nurtec suggested if supplements are ineffective.  - Recommend riboflavin and magnesium supplements.  - Suggest Migravent, a combination supplement available on [employer].  - Consider Nurtec if supplements are ineffective.         Return in about 6 months (around 7/29/2025), or 60 min follow up in person.     Administrative Statements     The management plan was discussed in detail with the patient and all questions were answered.     Ms. Villanueva was encouraged to contact our office with any questions or concerns and to contact the clinic or go to the nearest emergency room if symptoms change or worsen.       I have spent a total of 42 min in reviewing and/or ordering tests, medications, or procedures, performing an examination or evaluation, reviewing pertinent history, counseling and educating the patient, referring and/or communicating with other health care professionals, documenting in the EMR and general coordination of care of Ms. Villanueva  today.           Meet House MD.   Staff Neurologist,   Neuroimmunology and Neuroinfectious disease  01/29/25     This report has been created through the use of voice recognition/text compilation software.  Typographical and content errors may occur with this process.  While efforts are made to detect and correct such errors, in some cases errors will persist.  For this reason, wording in this  document should be considered in the proper context and not strictly verbatim.  If, when reviewing the document, an error is discovered, please let the office know at [phone]

## 2025-01-29 NOTE — PATIENT INSTRUCTIONS
Vitamins for headache  Mg 400 mg daily for headache  RiboFlavin 400 mg daily for headache        [email or web address]

## 2025-01-31 NOTE — ASSESSMENT & PLAN NOTE
Ms. Villanueva is a very pleasant 34 y.o. female presenting with multiple sclerosis follow-up. Briefly patient was diagnosed with multiple sclerosis based on left-sided facial numbness after undergoing an MRI of the brain which was showing lesions consistent with MS and positive spinal tap and meets the 2017 Pfeiffer's criteria for relapsing remitting MS. Most MRI showing no new lesions. Ocrevus is well-tolerated with no side effects. No new symptoms such as numbness, tingling, weakness, or muscle spasms. Occasional stress-related brain fog is manageable. Cymbalta effectively manages anxiety without adverse effects. Discussed potential cost reduction by switching to Ocrevus injection and advised against neck manipulation during chiropractic care due to vascular injury risk.    MULTIPLE SCLEROSIS PLAN:      Disease Modifying Therapy:   Continue with Ocrevus as your disease modifying therapy for MS. Side effects have already been discussed with the patient in detail. Consider switching to Ocrevus injection form to reduce costs.Would assist with insurance and reimbursement issues as needed.  Safety labs:   stable  Neuroimaging:   Would hold off to the neuroimaging including MR of the brain, C and T spine for now.--Last neuroimaging was stable.   Supplements:   Vit D and other supplements as recommended.  Vit D goal is above 40.   Exercise:   Regular exercise was recommended (At least 150 min a week)  Diet:   Mediterranean diet

## 2025-03-05 ENCOUNTER — TELEPHONE (OUTPATIENT)
Age: 35
End: 2025-03-05

## 2025-03-05 NOTE — TELEPHONE ENCOUNTER
Called patient and left voicemail informing patient that appointment with Dr. House has been rescheduled. Provider will no longer be available on that day/time. Requested patient to call back office if date/time doesn't work with patient's schedule. Appointment reminder card sent out to patient.

## 2025-03-20 ENCOUNTER — TELEPHONE (OUTPATIENT)
Dept: DERMATOLOGY | Facility: CLINIC | Age: 35
End: 2025-03-20

## 2025-03-20 NOTE — TELEPHONE ENCOUNTER
Called patient from referral workqueue. Left a message for pt to call the office if she is still interested in an appointment with Dermatology.

## 2025-03-25 DIAGNOSIS — Z30.41 ORAL CONTRACEPTIVE PILL SURVEILLANCE: ICD-10-CM

## 2025-03-26 RX ORDER — NORETHINDRONE 0.35 MG/1
1 TABLET ORAL DAILY
Qty: 84 TABLET | Refills: 0 | Status: SHIPPED | OUTPATIENT
Start: 2025-03-26

## 2025-03-26 NOTE — TELEPHONE ENCOUNTER
Patient needs an appointment. Please contact the patient to schedule an appointment. Last office visit: 4/26/24

## 2025-05-06 ENCOUNTER — APPOINTMENT (OUTPATIENT)
Dept: URGENT CARE | Facility: MEDICAL CENTER | Age: 35
End: 2025-05-06

## 2025-06-10 ENCOUNTER — TELEPHONE (OUTPATIENT)
Dept: NEUROLOGY | Facility: CLINIC | Age: 35
End: 2025-06-10

## 2025-06-10 NOTE — TELEPHONE ENCOUNTER
Received via [employer] from [employer] request for information for patient.  Request scanned into [employer].

## 2025-06-13 NOTE — TELEPHONE ENCOUNTER
Form states pt's plan with [employer] is no longer active. Confirmed via Epic coverage is e-rejecting. No alternate coverage on file.     Next Ocrevus infusion is scheduled for 7/11/25.     [employer] message sent to pt.    If no insurance, will see if pt would like to move forward with free drug program.

## 2025-06-17 NOTE — TELEPHONE ENCOUNTER
Left detailed message for pt (okay per communication consent) requesting update on insurance information.

## 2025-06-20 DIAGNOSIS — Z30.41 ORAL CONTRACEPTIVE PILL SURVEILLANCE: ICD-10-CM

## 2025-06-20 RX ORDER — NORETHINDRONE 0.35 MG/1
1 TABLET ORAL DAILY
Qty: 84 TABLET | Refills: 0 | Status: SHIPPED | OUTPATIENT
Start: 2025-06-20

## 2025-06-26 DIAGNOSIS — F33.42 RECURRENT MAJOR DEPRESSIVE DISORDER, IN FULL REMISSION (HCC): ICD-10-CM

## 2025-06-26 RX ORDER — DULOXETIN HYDROCHLORIDE 30 MG/1
30 CAPSULE, DELAYED RELEASE ORAL 2 TIMES DAILY
Qty: 60 CAPSULE | Refills: 0 | Status: SHIPPED | OUTPATIENT
Start: 2025-06-26

## 2025-06-27 ENCOUNTER — TELEPHONE (OUTPATIENT)
Dept: NEUROLOGY | Facility: CLINIC | Age: 35
End: 2025-06-27

## 2025-06-27 DIAGNOSIS — D84.821 IMMUNODEFICIENCY DUE TO TREATMENT WITH IMMUNOSUPPRESSIVE MEDICATION (HCC): ICD-10-CM

## 2025-06-27 DIAGNOSIS — T45.1X5A IMMUNODEFICIENCY DUE TO TREATMENT WITH IMMUNOSUPPRESSIVE MEDICATION (HCC): ICD-10-CM

## 2025-06-27 DIAGNOSIS — Z79.60 IMMUNODEFICIENCY DUE TO TREATMENT WITH IMMUNOSUPPRESSIVE MEDICATION (HCC): ICD-10-CM

## 2025-06-27 DIAGNOSIS — G35 MULTIPLE SCLEROSIS (HCC): Primary | ICD-10-CM

## 2025-06-27 DIAGNOSIS — Z11.1 TUBERCULOSIS SCREENING: ICD-10-CM

## 2025-06-27 DIAGNOSIS — Z11.59 ENCOUNTER FOR SCREENING FOR VIRAL DISEASE: ICD-10-CM

## 2025-06-27 RX ORDER — SODIUM CHLORIDE 9 MG/ML
20 INJECTION, SOLUTION INTRAVENOUS ONCE
OUTPATIENT
Start: 2025-07-11

## 2025-06-27 RX ORDER — ACETAMINOPHEN 325 MG/1
650 TABLET ORAL ONCE
OUTPATIENT
Start: 2025-07-11

## 2025-06-27 NOTE — TELEPHONE ENCOUNTER
Pt is scheduled for Ocrevus on 7/11/25.     Addressing Ocrevus PA in other encounter.     The following labs are required and have been entered as appropriate per protocol:    CBC  CMP  HCG   Immunoglobulins  HIV  Quantiferon TB gold  Acute hepatitis panel    MyChart message sent to pt reminding them of needed labs.    Awaiting labs.

## 2025-07-05 ENCOUNTER — APPOINTMENT (OUTPATIENT)
Dept: LAB | Facility: MEDICAL CENTER | Age: 35
End: 2025-07-05
Payer: COMMERCIAL

## 2025-07-05 DIAGNOSIS — Z79.60 IMMUNODEFICIENCY DUE TO TREATMENT WITH IMMUNOSUPPRESSIVE MEDICATION (HCC): ICD-10-CM

## 2025-07-05 DIAGNOSIS — Z11.59 ENCOUNTER FOR SCREENING FOR VIRAL DISEASE: ICD-10-CM

## 2025-07-05 DIAGNOSIS — G35 MULTIPLE SCLEROSIS (HCC): ICD-10-CM

## 2025-07-05 DIAGNOSIS — D84.821 IMMUNODEFICIENCY DUE TO TREATMENT WITH IMMUNOSUPPRESSIVE MEDICATION (HCC): ICD-10-CM

## 2025-07-05 DIAGNOSIS — T45.1X5A IMMUNODEFICIENCY DUE TO TREATMENT WITH IMMUNOSUPPRESSIVE MEDICATION (HCC): ICD-10-CM

## 2025-07-05 DIAGNOSIS — Z11.1 TUBERCULOSIS SCREENING: ICD-10-CM

## 2025-07-05 LAB
ALBUMIN SERPL BCG-MCNC: 4.5 G/DL (ref 3.5–5)
ALP SERPL-CCNC: 67 U/L (ref 34–104)
ALT SERPL W P-5'-P-CCNC: 17 U/L (ref 7–52)
ANION GAP SERPL CALCULATED.3IONS-SCNC: 7 MMOL/L (ref 4–13)
AST SERPL W P-5'-P-CCNC: 15 U/L (ref 13–39)
BASOPHILS # BLD AUTO: 0.06 THOUSANDS/ÂΜL (ref 0–0.1)
BASOPHILS NFR BLD AUTO: 1 % (ref 0–1)
BILIRUB SERPL-MCNC: 0.34 MG/DL (ref 0.2–1)
BUN SERPL-MCNC: 12 MG/DL (ref 5–25)
CALCIUM SERPL-MCNC: 9.3 MG/DL (ref 8.4–10.2)
CHLORIDE SERPL-SCNC: 104 MMOL/L (ref 96–108)
CO2 SERPL-SCNC: 29 MMOL/L (ref 21–32)
CREAT SERPL-MCNC: 0.76 MG/DL (ref 0.6–1.3)
EOSINOPHIL # BLD AUTO: 0.2 THOUSAND/ÂΜL (ref 0–0.61)
EOSINOPHIL NFR BLD AUTO: 4 % (ref 0–6)
ERYTHROCYTE [DISTWIDTH] IN BLOOD BY AUTOMATED COUNT: 12.2 % (ref 11.6–15.1)
GFR SERPL CREATININE-BSD FRML MDRD: 103 ML/MIN/1.73SQ M
GLUCOSE P FAST SERPL-MCNC: 87 MG/DL (ref 65–99)
HCG SERPL QL: NEGATIVE
HCT VFR BLD AUTO: 45.2 % (ref 34.8–46.1)
HGB BLD-MCNC: 14.9 G/DL (ref 11.5–15.4)
IGA SERPL-MCNC: 257 MG/DL (ref 66–433)
IGG SERPL-MCNC: 932 MG/DL (ref 635–1741)
IGM SERPL-MCNC: 95 MG/DL (ref 45–281)
IMM GRANULOCYTES # BLD AUTO: 0.01 THOUSAND/UL (ref 0–0.2)
IMM GRANULOCYTES NFR BLD AUTO: 0 % (ref 0–2)
LYMPHOCYTES # BLD AUTO: 1.77 THOUSANDS/ÂΜL (ref 0.6–4.47)
LYMPHOCYTES NFR BLD AUTO: 36 % (ref 14–44)
MCH RBC QN AUTO: 30.5 PG (ref 26.8–34.3)
MCHC RBC AUTO-ENTMCNC: 33 G/DL (ref 31.4–37.4)
MCV RBC AUTO: 93 FL (ref 82–98)
MONOCYTES # BLD AUTO: 0.61 THOUSAND/ÂΜL (ref 0.17–1.22)
MONOCYTES NFR BLD AUTO: 12 % (ref 4–12)
NEUTROPHILS # BLD AUTO: 2.28 THOUSANDS/ÂΜL (ref 1.85–7.62)
NEUTS SEG NFR BLD AUTO: 47 % (ref 43–75)
NRBC BLD AUTO-RTO: 0 /100 WBCS
PLATELET # BLD AUTO: 305 THOUSANDS/UL (ref 149–390)
PMV BLD AUTO: 10.6 FL (ref 8.9–12.7)
POTASSIUM SERPL-SCNC: 4.8 MMOL/L (ref 3.5–5.3)
PROT SERPL-MCNC: 6.9 G/DL (ref 6.4–8.4)
RBC # BLD AUTO: 4.88 MILLION/UL (ref 3.81–5.12)
SODIUM SERPL-SCNC: 140 MMOL/L (ref 135–147)
WBC # BLD AUTO: 4.93 THOUSAND/UL (ref 4.31–10.16)

## 2025-07-05 PROCEDURE — 85025 COMPLETE CBC W/AUTO DIFF WBC: CPT

## 2025-07-05 PROCEDURE — 84703 CHORIONIC GONADOTROPIN ASSAY: CPT

## 2025-07-05 PROCEDURE — 82784 ASSAY IGA/IGD/IGG/IGM EACH: CPT

## 2025-07-05 PROCEDURE — 86480 TB TEST CELL IMMUN MEASURE: CPT

## 2025-07-05 PROCEDURE — 87389 HIV-1 AG W/HIV-1&-2 AB AG IA: CPT

## 2025-07-05 PROCEDURE — 80074 ACUTE HEPATITIS PANEL: CPT

## 2025-07-05 PROCEDURE — 36415 COLL VENOUS BLD VENIPUNCTURE: CPT

## 2025-07-05 PROCEDURE — 80053 COMPREHEN METABOLIC PANEL: CPT

## 2025-07-06 LAB
GAMMA INTERFERON BACKGROUND BLD IA-ACNC: 0.03 IU/ML
HAV IGM SER QL: NORMAL
HBV CORE IGM SER QL: NORMAL
HBV SURFACE AG SER QL: NORMAL
HCV AB SER QL: NORMAL
HIV 1+2 AB+HIV1 P24 AG SERPL QL IA: NORMAL
M TB IFN-G BLD-IMP: NEGATIVE
M TB IFN-G CD4+ BCKGRND COR BLD-ACNC: 0.05 IU/ML
M TB IFN-G CD4+ BCKGRND COR BLD-ACNC: 0.06 IU/ML
MITOGEN IGNF BCKGRD COR BLD-ACNC: 9.97 IU/ML

## 2025-07-08 NOTE — TELEPHONE ENCOUNTER
Lab results in chart.     No changes since last OV.     Okay to proceed with Ocrevus infusion on 7/11/25?

## 2025-07-08 NOTE — TELEPHONE ENCOUNTER
Labs reviewed.  Please proceed with the Ocrevus infusion.     Thank you,     Dr. Harsh MD.   07/08/25   4:31 PM

## 2025-07-11 ENCOUNTER — HOSPITAL ENCOUNTER (OUTPATIENT)
Dept: INFUSION CENTER | Facility: CLINIC | Age: 35
End: 2025-07-11
Payer: COMMERCIAL

## 2025-07-11 VITALS
TEMPERATURE: 98.1 F | OXYGEN SATURATION: 97 % | DIASTOLIC BLOOD PRESSURE: 70 MMHG | HEART RATE: 84 BPM | RESPIRATION RATE: 18 BRPM | SYSTOLIC BLOOD PRESSURE: 111 MMHG

## 2025-07-11 DIAGNOSIS — G35 MULTIPLE SCLEROSIS (HCC): Primary | ICD-10-CM

## 2025-07-11 PROCEDURE — 96375 TX/PRO/DX INJ NEW DRUG ADDON: CPT

## 2025-07-11 PROCEDURE — 96413 CHEMO IV INFUSION 1 HR: CPT

## 2025-07-11 PROCEDURE — 96361 HYDRATE IV INFUSION ADD-ON: CPT

## 2025-07-11 PROCEDURE — 96376 TX/PRO/DX INJ SAME DRUG ADON: CPT

## 2025-07-11 PROCEDURE — 96415 CHEMO IV INFUSION ADDL HR: CPT

## 2025-07-11 PROCEDURE — 96367 TX/PROPH/DG ADDL SEQ IV INF: CPT

## 2025-07-11 RX ORDER — SODIUM CHLORIDE 9 MG/ML
20 INJECTION, SOLUTION INTRAVENOUS ONCE
OUTPATIENT
Start: 2026-01-01

## 2025-07-11 RX ORDER — ACETAMINOPHEN 325 MG/1
650 TABLET ORAL ONCE
OUTPATIENT
Start: 2026-01-01

## 2025-07-11 RX ORDER — SODIUM CHLORIDE 9 MG/ML
20 INJECTION, SOLUTION INTRAVENOUS ONCE
Status: COMPLETED | OUTPATIENT
Start: 2025-07-11 | End: 2025-07-11

## 2025-07-11 RX ORDER — ACETAMINOPHEN 325 MG/1
650 TABLET ORAL ONCE
Status: COMPLETED | OUTPATIENT
Start: 2025-07-11 | End: 2025-07-11

## 2025-07-11 RX ORDER — FAMOTIDINE 10 MG/ML
20 INJECTION, SOLUTION INTRAVENOUS ONCE
Status: COMPLETED | OUTPATIENT
Start: 2025-07-11 | End: 2025-07-11

## 2025-07-11 RX ORDER — HYDROCORTISONE SODIUM SUCCINATE 100 MG/2ML
50 INJECTION INTRAMUSCULAR; INTRAVENOUS
Status: ACTIVE | OUTPATIENT
Start: 2025-07-11 | End: 2025-07-11

## 2025-07-11 RX ORDER — DIPHENHYDRAMINE HYDROCHLORIDE 50 MG/ML
25 INJECTION, SOLUTION INTRAMUSCULAR; INTRAVENOUS
Status: ACTIVE | OUTPATIENT
Start: 2025-07-11 | End: 2025-07-11

## 2025-07-11 RX ADMIN — FAMOTIDINE 20 MG: 10 INJECTION, SOLUTION INTRAVENOUS at 13:00

## 2025-07-11 RX ADMIN — SODIUM CHLORIDE 500 ML: 0.9 INJECTION, SOLUTION INTRAVENOUS at 13:01

## 2025-07-11 RX ADMIN — SODIUM CHLORIDE 20 ML/HR: 0.9 INJECTION, SOLUTION INTRAVENOUS at 07:41

## 2025-07-11 RX ADMIN — DIPHENHYDRAMINE HYDROCHLORIDE 25 MG: 50 INJECTION INTRAMUSCULAR; INTRAVENOUS at 12:57

## 2025-07-11 RX ADMIN — FAMOTIDINE 20 MG: 10 INJECTION, SOLUTION INTRAVENOUS at 07:42

## 2025-07-11 RX ADMIN — DIPHENHYDRAMINE HYDROCHLORIDE 50 MG: 50 INJECTION INTRAMUSCULAR; INTRAVENOUS at 08:06

## 2025-07-11 RX ADMIN — OCRELIZUMAB 600 MG: 300 INJECTION INTRAVENOUS at 09:35

## 2025-07-11 RX ADMIN — ACETAMINOPHEN 650 MG: 325 TABLET ORAL at 08:31

## 2025-07-11 RX ADMIN — SODIUM CHLORIDE 100 MG: 0.9 INJECTION, SOLUTION INTRAVENOUS at 08:31

## 2025-07-11 RX ADMIN — HYDROCORTISONE SODIUM SUCCINATE 50 MG: 100 INJECTION, POWDER, FOR SOLUTION INTRAMUSCULAR; INTRAVENOUS at 12:58

## 2025-07-11 NOTE — PROGRESS NOTES
Susan Villanueva presents for Ocrevus, offers no complaints. PIV placed with positive blood return. Vitals taken throughout infusion. Patient started to complain of flushing and feeling hot during last 15 minutes of infusion at final 200 ml/hr titration. Infusion stopped and hypersensitivity initiated per protocol. Vitals stable. Given 25 mg benadryl, 50 mg hydrocortisone, 20 mg Pepcid and 500 ml bolus. Patient reported symptoms resolved after receiving hypersensitivity mediations. Reached out to Alma Rosa Vela RN, and Meet House MD. Stated to restart infusion at 100 ml/hr, half the rate at which patient reacted. Per Dr. House no need to finish 500 ml bolus. Restart order placed in treatment plan. Restarted Ocrevus infusion at 100 ml/hr per order and titrated per protocol, double checked with Janeen Poe RN. Tolerated remainder of treatment and 1 hour observation well with no complications. Reached out to office to see if any changes need to be made to future appointment due to reaction.    Susan Villanueva is aware of future appt on 1/9/26 at 7:30 am. PIV removed.    AVS declined.

## 2025-07-11 NOTE — TELEPHONE ENCOUNTER
"Received message from infusion RN stating \"Susan Hernandez was receiving Ocrevus today, she had about 15 minutes left of her infusion at the current rate 200ml/hr per the titration when she started experiencing flushing and was \"hot\". We stopped the infusion and gave her the hypersensitvity protocol. She said the flushing has improved since the meds. Vitals stable 151/70 bp, he 86, o2 sat 96%. Currently receiving the 500 mL bolus.\"    Per mina Ferraro to restart infusion at 100mL/hr and titrate per protocol as tolerated.    Order entered.  "

## 2025-07-11 NOTE — TELEPHONE ENCOUNTER
"Pt called back. She requested to ask if she should take more Benadryl tonight or wait to see how she feels. Pt stated that she feels fine now, just a little drowsy from Benadryl.   [email or web address] message sent to nurse navigator and Dr. House.  Per Dr. House:  \"Hello! If she is feeling back to her baseline then no need for Benadryl but if she feels any symptoms then she can try! Thanks\"    _______________________________________    Called pt back and made her aware. Pt verbalized understanding.  "

## 2025-07-16 ENCOUNTER — PATIENT MESSAGE (OUTPATIENT)
Age: 35
End: 2025-07-16

## 2025-07-17 NOTE — PATIENT COMMUNICATION
Excuse letter generated and sent to the patient through my chart.     Thank you,     Dr. Harsh MD.   07/17/25   9:18 AM

## 2025-08-07 DIAGNOSIS — F33.42 RECURRENT MAJOR DEPRESSIVE DISORDER, IN FULL REMISSION (HCC): ICD-10-CM

## 2025-08-07 RX ORDER — DULOXETIN HYDROCHLORIDE 30 MG/1
30 CAPSULE, DELAYED RELEASE ORAL 2 TIMES DAILY
Qty: 180 CAPSULE | Refills: 0 | Status: SHIPPED | OUTPATIENT
Start: 2025-08-07 | End: 2025-08-14 | Stop reason: SDUPTHER